# Patient Record
Sex: FEMALE | Race: WHITE | NOT HISPANIC OR LATINO | Employment: FULL TIME | URBAN - METROPOLITAN AREA
[De-identification: names, ages, dates, MRNs, and addresses within clinical notes are randomized per-mention and may not be internally consistent; named-entity substitution may affect disease eponyms.]

---

## 2019-09-03 ENCOUNTER — TRANSCRIBE ORDERS (OUTPATIENT)
Dept: ADMINISTRATIVE | Facility: HOSPITAL | Age: 64
End: 2019-09-03

## 2019-09-03 DIAGNOSIS — N63.20 LEFT BREAST LUMP: Primary | ICD-10-CM

## 2019-09-10 ENCOUNTER — HOSPITAL ENCOUNTER (OUTPATIENT)
Dept: MAMMOGRAPHY | Facility: CLINIC | Age: 64
Discharge: HOME | End: 2019-09-10
Payer: COMMERCIAL

## 2019-09-10 ENCOUNTER — HOSPITAL ENCOUNTER (OUTPATIENT)
Dept: ULTRASOUND IMAGING | Facility: CLINIC | Age: 64
Discharge: HOME | End: 2019-09-10
Payer: COMMERCIAL

## 2019-09-10 VITALS — BODY MASS INDEX: 30.21 KG/M2 | HEIGHT: 61 IN | WEIGHT: 160 LBS

## 2019-09-10 DIAGNOSIS — N63.20 LEFT BREAST LUMP: ICD-10-CM

## 2019-09-10 PROCEDURE — 76642 ULTRASOUND BREAST LIMITED: CPT

## 2019-09-10 PROCEDURE — G0279 TOMOSYNTHESIS, MAMMO: HCPCS

## 2019-09-10 PROCEDURE — 77066 DX MAMMO INCL CAD BI: CPT

## 2019-09-17 ENCOUNTER — HOSPITAL ENCOUNTER (OUTPATIENT)
Dept: MAMMOGRAPHY | Facility: CLINIC | Age: 64
Discharge: HOME | End: 2019-09-17
Payer: COMMERCIAL

## 2019-09-17 ENCOUNTER — HOSPITAL ENCOUNTER (OUTPATIENT)
Dept: ULTRASOUND IMAGING | Facility: CLINIC | Age: 64
Discharge: HOME | End: 2019-09-17
Payer: COMMERCIAL

## 2019-09-17 ENCOUNTER — HOSPITAL ENCOUNTER (OUTPATIENT)
Dept: MAMMOGRAPHY | Facility: CLINIC | Age: 64
Discharge: HOME | End: 2019-09-17
Admitting: RADIOLOGY
Payer: COMMERCIAL

## 2019-09-17 VITALS — HEIGHT: 61 IN | BODY MASS INDEX: 30.21 KG/M2 | WEIGHT: 160 LBS

## 2019-09-17 VITALS — SYSTOLIC BLOOD PRESSURE: 140 MMHG | HEART RATE: 68 BPM | DIASTOLIC BLOOD PRESSURE: 70 MMHG

## 2019-09-17 DIAGNOSIS — R92.8 ABNORMAL MAMMOGRAM: ICD-10-CM

## 2019-09-17 DIAGNOSIS — Z98.890 STATUS POST BREAST BIOPSY: ICD-10-CM

## 2019-09-17 DIAGNOSIS — R92.8 ABNORMAL ULTRASOUND OF BREAST: ICD-10-CM

## 2019-09-17 PROCEDURE — 88341 IMHCHEM/IMCYTCHM EA ADD ANTB: CPT | Performed by: PATHOLOGY

## 2019-09-17 PROCEDURE — 88305 TISSUE EXAM BY PATHOLOGIST: CPT | Performed by: PATHOLOGY

## 2019-09-17 PROCEDURE — 88342 IMHCHEM/IMCYTCHM 1ST ANTB: CPT | Performed by: PATHOLOGY

## 2019-09-17 PROCEDURE — 88361 TUMOR IMMUNOHISTOCHEM/COMPUT: CPT | Performed by: PATHOLOGY

## 2019-09-17 PROCEDURE — 19084 BX BREAST ADD LESION US IMAG: CPT

## 2019-09-17 PROCEDURE — 19083 BX BREAST 1ST LESION US IMAG: CPT

## 2019-09-17 PROCEDURE — 19081 BX BREAST 1ST LESION STRTCTC: CPT

## 2019-09-17 RX ORDER — LIDOCAINE HYDROCHLORIDE 10 MG/ML
4 INJECTION, SOLUTION INFILTRATION; PERINEURAL ONCE
Status: COMPLETED | OUTPATIENT
Start: 2019-09-17 | End: 2019-09-17

## 2019-09-17 RX ORDER — LIDOCAINE HYDROCHLORIDE AND EPINEPHRINE BITARTRATE 20; .01 MG/ML; MG/ML
10 INJECTION, SOLUTION SUBCUTANEOUS ONCE
Status: COMPLETED | OUTPATIENT
Start: 2019-09-17 | End: 2019-09-17

## 2019-09-17 RX ADMIN — LIDOCAINE HYDROCHLORIDE 4 ML: 10 INJECTION, SOLUTION INFILTRATION; PERINEURAL at 14:01

## 2019-09-17 RX ADMIN — LIDOCAINE HYDROCHLORIDE 4 ML: 10 INJECTION, SOLUTION INFILTRATION; PERINEURAL at 14:07

## 2019-09-17 RX ADMIN — LIDOCAINE HYDROCHLORIDE AND EPINEPHRINE 10 ML: 20; 10 INJECTION, SOLUTION INFILTRATION; PERINEURAL at 13:06

## 2019-09-17 RX ADMIN — LIDOCAINE HYDROCHLORIDE 4 ML: 10 INJECTION, SOLUTION INFILTRATION; PERINEURAL at 13:06

## 2019-09-17 NOTE — PROGRESS NOTES
(2nd site)  Procedure type:  __x___ultrasound guided _____stereotactic    Breast:  __x___Left _____Right    Location: 5:00 7cm from nipple    Needle: 12g Marquee    # of passes: 3 (specimen A)    Clip: Hydromark-butterfly    Performed by: Dr Carlo Baxter held for 5 minutes by: Dr Endy Dhillon Strips:  ___x__yes _____no    Band aid:  ___x__yes_____no    Tape and guaze:  _____yes __x___no    Tolerated procedure:  __x___yes _____no      (3rd site)  Procedure type:  __x___ultrasound guided _____stereotactic    Breast:  __x___Left _____Right    Location: 1:00 5cm from nipple    Needle: 12g Marquee    # of passes: 2 (specimen B)    Clip: Hydromark-open coil    Performed by: Dr Carlo Baxter held for 5 minutes by: Christina Lam Strips:  __x___yes _____no    Band aid:  __x___yes_____no    Tape and guaze:  _____yes __x___no    Tolerated procedure:  __x___yes _____no

## 2019-09-17 NOTE — DISCHARGE INSTR - OTHER ORDERS
POST LARGE CORE BREAST BIOPSY PATIENT INFORMATION      1  Place an ice pack inside your bra over the top of the dressing every hour for 20 minutes (20 minutes on, 60 minutes off)  Do this until bedtime  2  Do not shower or bathe until the following morning  3  You may bathe your breast carefully with the steri-strips in place  Be careful    Not to loosen them  The steri-strips will fall off in 3-5 days  4  You may have mild discomfort, and you may have some bruising where the   Needle entered the skin  This should clear within 5-7 days  5  If you need medicine for discomfort, take acetaminophen products such as   Tylenol  You may also take Advil or Motrin products  6  Do not participate in strenuous activities such as-tennis, aerobics, skiing,  Weight lifting, etc  for 24 hours  Refrain from swimming/soaking for 72 hours  7  Wearing a bra for sleeping may be more comfortable for the first 24-48 hours  8  Watch for continued bleeding, pain or fever over 101; please call with any questions or concerns  For procedures done at the [facility]  [address] call:  Diego Tatum RN at [phone]  Erenest Curling RN at [phone]                    *After 4 PM call the Interventional Radiology Department                    [phone] and ask to speak with the nurse on call  For procedures done at the [address] [place] [address] call:         Zachary Spatz RN at   *After 4 PM call the Interventional Radiology Department   [phone] and ask to speak with the nurse on call  For procedures done at [address] [place] [address] call: The Radiology Nurse at [phone]  *After 4 PM call your physician, or go to the Emergency Department  9          The final results of your biopsy are usually available within one week

## 2019-09-17 NOTE — PROGRESS NOTES
(1st site)  Procedure type:  _____ultrasound guided __x___stereotactic    Breast:  _____Left __x___Right    Location:    Needle: 8g Mammotome-Revolve    # of passes: 5 no calcs (density)    Clip: Mammomark-triple twist    Performed by: Dr July Dietz held for 5 minutes by: Burton Biggs    Steri Strips:  ___x__yes _____no    Band aid:  ___x__yes_____no    Tape and guaze:  _____yes __x___no    Tolerated procedure:  __x___yes _____no

## 2019-09-18 NOTE — PROGRESS NOTES
Post procedure call completed on 9/18/19 @ 2962    Bleeding: _____yes __x___no    Pain: _____yes __x____no    Redness/Swelling: ______yes __x____no    Band aid removed: __x___yes _____no    Steri-Strips intact: __x____yes _____no

## 2019-09-19 ENCOUNTER — TELEPHONE (OUTPATIENT)
Dept: MAMMOGRAPHY | Facility: CLINIC | Age: 64
End: 2019-09-19

## 2019-09-19 ENCOUNTER — TELEPHONE (OUTPATIENT)
Dept: SURGICAL ONCOLOGY | Facility: CLINIC | Age: 64
End: 2019-09-19

## 2019-09-19 NOTE — PROGRESS NOTES
Patient Past Medical History:  HTN; High Cholesterol          Allergies:  NKA        Past Breast History:     Previous Biopsy:   Yes______ No___x_____      Breast: Right____ Left______       Malignant______ Benign_______      Treatments if applicable        Present Breast History:     Right__________    Left_____x________     Density_________    Calcifications____________   Palpable_______x_______      Patient notified of results on: 9/19/19    Date of Appointment with Surgeon  Jacqui Morrison on 10/3/19 @ 9 am

## 2019-09-19 NOTE — TELEPHONE ENCOUNTER
New Patient Encounter    New Patient Intake Form   Patient Details:  Eduardo Vasquez  1955  [id number]    Background Information:  [address] starts by opening a telephone encounter and gathering the following information   Who is calling to schedule? If not self, relationship to patient? Lissa ROSAS   Referring Provider RBC    What is the diagnosis? L BREAST CA   When was the diagnosis? 9/2019   Is patient aware of diagnosis? Yes   Reason for visit? NP DX   Have you had any testing done? If so: when, where? Yes   Are records in [facility]? yes   Was the patient told to bring a disk? no   Scheduling Information:   Preferred [place]:  Any     Requesting Specific Provider? NO   Are there any dates/time the patient cannot be seen? NO   Counseling Pre-Screen:  If the patient answers YES to any of the below questions, please route to the appropriate location specific counselor    Have you felt anxious or worried about cancer and the treatment you are receiving? DID NOT SPEAK TO PT   Has your diagnosis caused physical, emotional, or financial hardship for you? DID NOT SPEAK TO PT    Note: Do not ask the patient about transportation issues/needs  Please notate if the patient brings it up and the counselor will schedule accordingly  Miscellaneous:requesting a  call her to answer her insurance questions  After completing the above information, please route to Financial Counselor and the appropriate Nurse Navigator for review

## 2019-09-24 ENCOUNTER — TELEPHONE (OUTPATIENT)
Dept: HEMATOLOGY ONCOLOGY | Facility: CLINIC | Age: 64
End: 2019-09-24

## 2019-10-01 PROBLEM — Z17.0 MALIGNANT NEOPLASM OF LOWER-OUTER QUADRANT OF LEFT BREAST OF FEMALE, ESTROGEN RECEPTOR POSITIVE (HCC): Status: ACTIVE | Noted: 2019-10-01

## 2019-10-01 PROBLEM — C50.512 MALIGNANT NEOPLASM OF LOWER-OUTER QUADRANT OF LEFT BREAST OF FEMALE, ESTROGEN RECEPTOR POSITIVE (HCC): Status: ACTIVE | Noted: 2019-10-01

## 2019-10-03 ENCOUNTER — APPOINTMENT (OUTPATIENT)
Dept: LAB | Facility: CLINIC | Age: 64
End: 2019-10-03
Payer: COMMERCIAL

## 2019-10-03 ENCOUNTER — TRANSCRIBE ORDERS (OUTPATIENT)
Dept: LAB | Facility: CLINIC | Age: 64
End: 2019-10-03

## 2019-10-03 ENCOUNTER — CONSULT (OUTPATIENT)
Dept: SURGICAL ONCOLOGY | Facility: CLINIC | Age: 64
End: 2019-10-03
Payer: COMMERCIAL

## 2019-10-03 ENCOUNTER — DOCUMENTATION (OUTPATIENT)
Dept: HEMATOLOGY ONCOLOGY | Facility: CLINIC | Age: 64
End: 2019-10-03

## 2019-10-03 VITALS
DIASTOLIC BLOOD PRESSURE: 80 MMHG | TEMPERATURE: 98.1 F | SYSTOLIC BLOOD PRESSURE: 140 MMHG | RESPIRATION RATE: 16 BRPM | HEIGHT: 61 IN | WEIGHT: 161 LBS | BODY MASS INDEX: 30.4 KG/M2 | HEART RATE: 111 BPM

## 2019-10-03 DIAGNOSIS — Z17.0 ESTROGEN RECEPTOR POSITIVE: ICD-10-CM

## 2019-10-03 DIAGNOSIS — Z17.0 MALIGNANT NEOPLASM OF LOWER-OUTER QUADRANT OF LEFT BREAST OF FEMALE, ESTROGEN RECEPTOR POSITIVE (HCC): ICD-10-CM

## 2019-10-03 DIAGNOSIS — C50.512 MALIGNANT NEOPLASM OF LOWER-OUTER QUADRANT OF LEFT BREAST OF FEMALE, ESTROGEN RECEPTOR POSITIVE (HCC): ICD-10-CM

## 2019-10-03 DIAGNOSIS — C50.512 MALIGNANT NEOPLASM OF LOWER-OUTER QUADRANT OF LEFT FEMALE BREAST, UNSPECIFIED ESTROGEN RECEPTOR STATUS (HCC): ICD-10-CM

## 2019-10-03 DIAGNOSIS — C50.512 MALIGNANT NEOPLASM OF LOWER-OUTER QUADRANT OF LEFT BREAST OF FEMALE, ESTROGEN RECEPTOR POSITIVE (HCC): Primary | ICD-10-CM

## 2019-10-03 DIAGNOSIS — C50.512 MALIGNANT NEOPLASM OF LOWER-OUTER QUADRANT OF LEFT FEMALE BREAST, UNSPECIFIED ESTROGEN RECEPTOR STATUS (HCC): Primary | ICD-10-CM

## 2019-10-03 DIAGNOSIS — Z17.0 MALIGNANT NEOPLASM OF LOWER-OUTER QUADRANT OF LEFT BREAST OF FEMALE, ESTROGEN RECEPTOR POSITIVE (HCC): Primary | ICD-10-CM

## 2019-10-03 LAB
ALBUMIN SERPL BCP-MCNC: 3.9 G/DL (ref 3.5–5)
ALP SERPL-CCNC: 87 U/L (ref 46–116)
ALT SERPL W P-5'-P-CCNC: 142 U/L (ref 12–78)
ANION GAP SERPL CALCULATED.3IONS-SCNC: 7 MMOL/L (ref 4–13)
AST SERPL W P-5'-P-CCNC: 100 U/L (ref 5–45)
BASOPHILS # BLD AUTO: 0.05 THOUSANDS/ΜL (ref 0–0.1)
BASOPHILS NFR BLD AUTO: 1 % (ref 0–1)
BILIRUB SERPL-MCNC: 0.7 MG/DL (ref 0.2–1)
BUN SERPL-MCNC: 9 MG/DL (ref 5–25)
CALCIUM SERPL-MCNC: 9.3 MG/DL (ref 8.3–10.1)
CHLORIDE SERPL-SCNC: 102 MMOL/L (ref 100–108)
CO2 SERPL-SCNC: 29 MMOL/L (ref 21–32)
CREAT SERPL-MCNC: 0.58 MG/DL (ref 0.6–1.3)
EOSINOPHIL # BLD AUTO: 0.08 THOUSAND/ΜL (ref 0–0.61)
EOSINOPHIL NFR BLD AUTO: 1 % (ref 0–6)
ERYTHROCYTE [DISTWIDTH] IN BLOOD BY AUTOMATED COUNT: 13.2 % (ref 11.6–15.1)
GFR SERPL CREATININE-BSD FRML MDRD: 99 ML/MIN/1.73SQ M
GLUCOSE P FAST SERPL-MCNC: 115 MG/DL (ref 65–99)
HCT VFR BLD AUTO: 44.7 % (ref 34.8–46.1)
HGB BLD-MCNC: 14.3 G/DL (ref 11.5–15.4)
IMM GRANULOCYTES # BLD AUTO: 0.01 THOUSAND/UL (ref 0–0.2)
IMM GRANULOCYTES NFR BLD AUTO: 0 % (ref 0–2)
LYMPHOCYTES # BLD AUTO: 1.17 THOUSANDS/ΜL (ref 0.6–4.47)
LYMPHOCYTES NFR BLD AUTO: 19 % (ref 14–44)
MCH RBC QN AUTO: 31.3 PG (ref 26.8–34.3)
MCHC RBC AUTO-ENTMCNC: 32 G/DL (ref 31.4–37.4)
MCV RBC AUTO: 98 FL (ref 82–98)
MONOCYTES # BLD AUTO: 0.46 THOUSAND/ΜL (ref 0.17–1.22)
MONOCYTES NFR BLD AUTO: 7 % (ref 4–12)
NEUTROPHILS # BLD AUTO: 4.5 THOUSANDS/ΜL (ref 1.85–7.62)
NEUTS SEG NFR BLD AUTO: 72 % (ref 43–75)
NRBC BLD AUTO-RTO: 0 /100 WBCS
PLATELET # BLD AUTO: 243 THOUSANDS/UL (ref 149–390)
PMV BLD AUTO: 9.7 FL (ref 8.9–12.7)
POTASSIUM SERPL-SCNC: 4.4 MMOL/L (ref 3.5–5.3)
PROT SERPL-MCNC: 8.1 G/DL (ref 6.4–8.2)
RBC # BLD AUTO: 4.57 MILLION/UL (ref 3.81–5.12)
SODIUM SERPL-SCNC: 138 MMOL/L (ref 136–145)
WBC # BLD AUTO: 6.27 THOUSAND/UL (ref 4.31–10.16)

## 2019-10-03 PROCEDURE — 85025 COMPLETE CBC W/AUTO DIFF WBC: CPT

## 2019-10-03 PROCEDURE — 36415 COLL VENOUS BLD VENIPUNCTURE: CPT

## 2019-10-03 PROCEDURE — 80053 COMPREHEN METABOLIC PANEL: CPT

## 2019-10-03 PROCEDURE — 99245 OFF/OP CONSLTJ NEW/EST HI 55: CPT | Performed by: SURGERY

## 2019-10-03 RX ORDER — UBIDECARENONE 75 MG
100 CAPSULE ORAL
COMMUNITY
End: 2021-06-16

## 2019-10-03 RX ORDER — BIOTIN 1 MG
1000 TABLET ORAL
COMMUNITY

## 2019-10-03 NOTE — PROGRESS NOTES
Surgical Oncology Consult Note       [address]  [facility] [place]  [address]  [place] [address]  1955  [id number]  [address]  [facility] [place]  [address] [facility] [place] [address]      Chief Complaint:     Chief Complaint   Patient presents with    Consult    Breast Cancer       Assessment and Plan:   Assessment/Plan     Patient presents with a new diagnosis of left breast HER2 positive cancer  The patient felt a palpable lump in the lower  Left breast    This was biopsied  And shown to be HER2 positive grade 1 disease  She had 2  Benign biopsies also performed  I have recommended genetic testing and presuming that is negative in breast conservation therapy  Because she is grade 1 and 2 cm in node-negative for I have recommended surgery up front but also chemotherapy afterwards in addition to radiation therapy  We will coordinate her genetic testing and see her back shortly thereafter  Oncology History:        Malignant neoplasm of lower-outer quadrant of left breast of female, estrogen receptor positive ([facility] [address] )    9/17/2019 Biopsy     Left breast ultrasound-guided biospy  A  5 o'clock, 7 cm from nipple  Invasive mammary carcinoma of no special type (ductal, not otherwise specified)  Grade 1  ER 70  OH < 1  HER2 3+    B  1 o'clock, 5 cm from nipple  Benign, fibrocystic changes without atypia  Involving intraductal micropapilloma  No evidence of malignancy    Right breast stereotactic biopsy  C  Right breast without calcifications  Benign, fibrocystic changes without atypia  No evidence of malignancy    Concordant  Unifocal; measures 2 cm on US  Left axilla negative  Right breast clear  Carcinoma with butterfly shaped clip  History of Present Illness:      This is a 56-year-old woman who appreciated a palpable abnormality in her lower left breast   She had a diagnostic mammogram which showed no abnormalities on the right side however there were 3 abnormalities on the left side all of which were biopsied to were benign however the palpable mass at the 5 o'clock position 7 cm from the nipple which shown to be invasive ductal carcinoma measuring approximately 2 cm in size  It was grade 1 ER 70% OK negative and HER2 3+/ positive  Evaluation of the axilla was performed and showed no evidence of any adenopathy  Her clip is a butterfly clip in this position her benign clip is open coil  Her family history is remarkable for her father having  Prostate cancer in his 76s  The patient has no children but does have other family members which would be impacted by a positive genetic results  Review of Systems:   Review of Systems   Constitutional: Negative for activity change, appetite change and fatigue  HENT: Negative  Eyes: Negative  Respiratory: Negative for cough, shortness of breath and wheezing  Cardiovascular: Negative for chest pain and leg swelling  Gastrointestinal: Negative  Endocrine: Negative  Genitourinary: Negative  Musculoskeletal:        No new changes or complaints of bone pain   Skin: Negative  Allergic/Immunologic: Negative  Neurological: Negative  Hematological: Negative  Psychiatric/Behavioral: Negative  Past Medical History:      Patient Active Problem List   Diagnosis    Malignant neoplasm of lower-outer quadrant of left breast of female, estrogen receptor positive ([facility] [address] )      History reviewed  No pertinent past medical history       Past Surgical History:   Procedure Laterality Date    BREAST BIOPSY Left 09/17/2019    MAMMO STEREOTACTIC BREAST BIOPSY RIGHT (ALL INC) Right 9/17/2019    US GUIDANCE BREAST BIOPSY LEFT EACH ADDITIONAL Left 9/17/2019    US GUIDED BREAST BIOPSY LEFT COMPLETE Left 9/17/2019        Family History   Problem Relation Age of Onset    Prostate cancer Father 79    No Known Problems Sister     No Known Problems Paternal Aunt     Nail disease Paternal Aunt     No Known Problems Paternal Aunt         Social History     Socioeconomic History    Marital status: /Civil Union     Spouse name: Not on file    Number of children: Not on file    Years of education: Not on file    Highest education level: Not on file   Occupational History    Not on file   Social Needs    Financial resource strain: Not on file    Food insecurity:     Worry: Not on file     Inability: Not on file    Transportation needs:     Medical: Not on file     Non-medical: Not on file   Tobacco Use    Smoking status: Former Smoker     Last attempt to quit:      Years since quittin 7    Smokeless tobacco: Never Used   Substance and Sexual Activity    Alcohol use:  Yes     Alcohol/week: 7 0 standard drinks     Types: 7 Standard drinks or equivalent per week    Drug use: Not on file    Sexual activity: Not on file   Lifestyle    Physical activity:     Days per week: Not on file     Minutes per session: Not on file    Stress: Not on file   Relationships    Social connections:     Talks on phone: Not on file     Gets together: Not on file     Attends Rastafarian service: Not on file     Active member of club or organization: Not on file     Attends meetings of clubs or organizations: Not on file     Relationship status: Not on file    Intimate partner violence:     Fear of current or ex partner: Not on file     Emotionally abused: Not on file     Physically abused: Not on file     Forced sexual activity: Not on file   Other Topics Concern    Not on file   Social History Narrative    Not on file        Current Outpatient Medications:     Cholecalciferol (VITAMIN D3) 1000 units CAPS, Take by mouth, Disp: , Rfl:     cyanocobalamin (VITAMIN B-12) 100 mcg tablet, Take by mouth daily, Disp: , Rfl:     Multiple Vitamins-Minerals (CENTRUM SILVER 50+WOMEN PO), Take by mouth, Disp: , Rfl:     olmesartan (BENICAR) 20 mg tablet, Take 40 mg by mouth daily, Disp: , Rfl: 2    rosuvastatin (CRESTOR) 5 mg tablet, TAKE 1 TABLET BY ORAL ROUTE EVERY MONDAY, WEDNESDAY, FRIDAY, Disp: , Rfl: 3   No Known Allergies    Physical Exam:     Vitals:    10/03/19 0851   BP: 140/80   Pulse: (!) 111   Resp: 16   Temp: 98 1 °F (36 7 °C)     Physical Exam   Constitutional: She is oriented to person, place, and time  She appears well-developed and well-nourished  HENT:   Head: Normocephalic and atraumatic  Mouth/Throat: Oropharynx is clear and moist    Eyes: Pupils are equal, round, and reactive to light  EOM are normal    Neck: Normal range of motion  Neck supple  No JVD present  No tracheal deviation present  No thyromegaly present  Cardiovascular: Normal rate, regular rhythm, normal heart sounds and intact distal pulses  Exam reveals no gallop and no friction rub  No murmur heard  Pulmonary/Chest: Effort normal and breath sounds normal  No respiratory distress  She has no wheezes  She has no rales  Examination of the right breast in both the sitting and supine position demonstrate no skin changes nipple discharge dominant masses or axillary adenopathy  Examination of the left breast demonstrates a dominant masses outlined on the diagram   There is no axillary adenopathy  She has minimal ecchymosis  There are no other masses within the breast        Abdominal: Soft  She exhibits no distension and no mass  There is no hepatomegaly  There is no tenderness  There is no rebound and no guarding  Musculoskeletal: Normal range of motion  She exhibits no edema or tenderness  Lymphadenopathy:     She has no cervical adenopathy  Neurological: She is alert and oriented to person, place, and time  No cranial nerve deficit  Skin: Skin is warm and dry  No rash noted  No erythema  Psychiatric: She has a normal mood and affect  Her behavior is normal    Vitals reviewed        Results:     I reviewed her mammogram and her pathology there concordant  Discussion/Summary:     I explained the patient for tumors that  HER2 positive and over 2 cm or have positive lymph nodes I recommend neoadjuvant chemotherapy  However her tumors at the borderline of this and because it is grade 1 and node-negative I have recommended surgery up front to help better define the behavior of the tumor and this would help the medical oncologist better determine appropriate systemic therapy  I have also recommended genetic testing  In the patient is agreeable to this approach  We will coordinate this today  I will see her back following her genetic testing results  All questions were answered the patient's satisfaction  Advance Care Planning/Advance Directives:  I discussed the disease status, treatment plans and follow-up with the patient

## 2019-10-03 NOTE — PROGRESS NOTES
BC Nurse Navigator:Navigation and Genetic visit  Met with patient and spouse, Edilson for initial navigation outreach and to facilitate genetic testing  I educated on insurance/copay and lab processes for genetic testing through [employer]  Explained my role as a nurse navigator and informed her that I am not a Genetics Counselor  I advised patient that if her results are either positive or a mutation of unknown significance we will schedule her for Genetics counseling session with a certified genetics counselor with the lab company   I advised her that positive result becomes part of the permanent medical records, and might have implications if she applies for life insurance  She verbalized understanding and wants to pursue testing-explained that results turnaround are typically 7-10 days, and Dr Ronaldo Castleman will share her results during her f/u visit in 2 weeks or sooner over the phone  Gave her literature on genetic testing, insurance/OOP averages and a card on how to reach the genetics lab for testing or billing concerns  Also provided info for testing of blood relatives through [facility]  Patient was mildly anxious and became tearful at the end of th visit  I provided therapeutic listening and offered emotional  support to the patient  I answered all her questions at this time to her satisfaction  Pt is requesting a  call her to answer her insurance questions, Referral sent to [address] [place] [address]  Pt was given my office contact information as well as a contact number, e-mail address and reference ID for [facility]  I provided her with written material for cancer support groups,cancer counselors etc   Patient verbalized understanding of information and was escorted to check out and then to the lab   Pt was encouraged to call for any questions/concerns  Will follow up with her on an as needed basis

## 2019-10-04 ENCOUNTER — DOCUMENTATION (OUTPATIENT)
Dept: HEMATOLOGY ONCOLOGY | Facility: CLINIC | Age: 64
End: 2019-10-04

## 2019-10-04 ENCOUNTER — TELEPHONE (OUTPATIENT)
Dept: SURGICAL ONCOLOGY | Facility: CLINIC | Age: 64
End: 2019-10-04

## 2019-10-04 ENCOUNTER — TELEPHONE (OUTPATIENT)
Dept: HEMATOLOGY ONCOLOGY | Facility: CLINIC | Age: 64
End: 2019-10-04

## 2019-10-04 DIAGNOSIS — R89.9 ABNORMAL LABORATORY TEST: Primary | ICD-10-CM

## 2019-10-04 DIAGNOSIS — C50.512 MALIGNANT NEOPLASM OF LOWER-OUTER QUADRANT OF LEFT BREAST OF FEMALE, ESTROGEN RECEPTOR POSITIVE (HCC): Primary | ICD-10-CM

## 2019-10-04 DIAGNOSIS — Z17.0 MALIGNANT NEOPLASM OF LOWER-OUTER QUADRANT OF LEFT BREAST OF FEMALE, ESTROGEN RECEPTOR POSITIVE (HCC): Primary | ICD-10-CM

## 2019-10-04 LAB — MISCELLANEOUS LAB TEST RESULT: NORMAL

## 2019-10-04 NOTE — TELEPHONE ENCOUNTER
Received a notification from the nurse navigator that patient had called with concerns regarding the CT scan  Called and spoke with patient who reported concerns regarding the CT scan  Per patient, she drank excessive alcohol on a recent vacation and believes that her LFTs are falsely elevated  Patient requested that the blood work be repeated  Dr Rafael Robin okay with this; order placed  Explained to patient to keep her CT scheduled in the event that there was no significant change in her LFT levels  Informed patient that a phone call would be made with her results  Patient verbalized understanding

## 2019-10-04 NOTE — TELEPHONE ENCOUNTER
[facility] Nurse Navigator:   Patient called informing me that her liver function tests were elevated and that a,  CT scan was recommended and ordered  Patient states she feels that is an aggressive approach and would like to first repeat the labs  She stated that she recently came back from a vacation and she had consumed more alcohol then normal  I did review her labs with her, voiced understanding of her wishes  Patient asked if Nelly Kelly RN could call her back  I spoke with Nelly Kelly who told me should would discuss with Dr Shawna Villalobos and then she would return pt's call  Will follow up as needed

## 2019-10-04 NOTE — TELEPHONE ENCOUNTER
Recvd message from Lisa singer that pt has some insurance & financial concerns could I call her  I called insurance & spoke to summer call ref# [id number]  Pt has an active cap blue ppo that runs on a meir year  Effective 07/01/14  Not cobra  Pt has chemo & radiation benefits $500 deduct met that  Then co-ins 80/20 out of pocket of $1500 met $620 01  No co-pays  Once the out of pocket is met the pt is covered at 100% & any co-pays would be waived  Chemo drugs are in the co-ins pcp co-pay is $25  Specialist ico-pay is $40  ER co-pay is $150 but waived if admitted  Advanced & standard dx imaging & labs have the same benefits co-ins then out of pocket then the pt will be covered at 100% once the  Out of pocket is met  Pt can go to any in network lab  In pt hospitalizations & out pt surgery  are covered the same way  genetic & genomic testing covered under the lab benefits  No rx benefits but speciality [facility] is [facility] not capitated but preferred  Can buy & bill  Called the pt to go over the benefits & got her voicemail  left her a message to call me back

## 2019-10-04 NOTE — TELEPHONE ENCOUNTER
Called Dr Anne Marie Pabon office this morning at the request of Dr Bryson Bryson  Informed their office that the patient's LFTs were elevated on her recent blood work  Per their office, the patient has not had any recent blood work to compare since 2017 when her levels were within normal limits  Spoke with Dr Bryson Bryson regarding this who recommended a CT scan  Called patient and informed her of the elevated numbers as well as the recommendation  Instructed patient to expect a phone call to schedule the imaging  Patient verbalized understanding and denies any additional questions or concerns at this time

## 2019-10-04 NOTE — PROGRESS NOTES
BC Nurse Navigator:Navigation and Genetic visit  Duplicate note from 83/8/39  Met with patient and spouse, for initial navigation outreach and to facilitate genetic testing  I educated on insurance/copay and lab processes for genetic testing through [facility]  Explained my role as a nurse navigator and informed her that I am not a Genetics Counselor  I advised patient that if her results are either positive or a mutation of unknown significance we will schedule her for Genetics counseling session with a certified genetics counselor with the lab company   I advised her that positive result becomes part of the permanent medical records, and might have implications if she applies for life insurance  She verbalized understanding and wants to pursue testing-explained that results turnaround are typically 7-10 days, and Dr Anibal Ragsdale will share her results during her f/u visit in 2 weeks or sooner over the phone  Gave her literature on genetic testing, insurance/OOP averages and a card on how to reach the genetics lab for testing or billing concerns  Also provided info for testing of blood relatives through [facility]  Patient was mildly anxious and became tearful at the end of th visit  I provided therapeutic listening and offered emotional  support to the patient  I answered all her questions at this time to her satisfaction  Pt is requesting a  call her to answer her insurance questions, Referral sent to [address] [place] [address]  Pt was given my office contact information as well as a contact number, e-mail address and reference ID for [facility]  I provided her with written material for cancer support groups,cancer counselors etc   Patient verbalized understanding of information and was escorted to check out and then to the lab   Pt was encouraged to call for any questions/concerns  Will follow up with her on an as needed basis

## 2019-10-08 ENCOUNTER — TELEPHONE (OUTPATIENT)
Dept: SURGICAL ONCOLOGY | Facility: CLINIC | Age: 64
End: 2019-10-08

## 2019-10-08 ENCOUNTER — APPOINTMENT (OUTPATIENT)
Dept: LAB | Facility: CLINIC | Age: 64
End: 2019-10-08
Payer: COMMERCIAL

## 2019-10-08 DIAGNOSIS — R89.9 ABNORMAL LABORATORY TEST: ICD-10-CM

## 2019-10-08 LAB
ALBUMIN SERPL BCP-MCNC: 3.9 G/DL (ref 3.5–5)
ALP SERPL-CCNC: 83 U/L (ref 46–116)
ALT SERPL W P-5'-P-CCNC: 139 U/L (ref 12–78)
ANION GAP SERPL CALCULATED.3IONS-SCNC: 8 MMOL/L (ref 4–13)
AST SERPL W P-5'-P-CCNC: 70 U/L (ref 5–45)
BILIRUB SERPL-MCNC: 0.51 MG/DL (ref 0.2–1)
BUN SERPL-MCNC: 11 MG/DL (ref 5–25)
CALCIUM SERPL-MCNC: 9.2 MG/DL (ref 8.3–10.1)
CHLORIDE SERPL-SCNC: 106 MMOL/L (ref 100–108)
CO2 SERPL-SCNC: 26 MMOL/L (ref 21–32)
CREAT SERPL-MCNC: 0.66 MG/DL (ref 0.6–1.3)
GFR SERPL CREATININE-BSD FRML MDRD: 95 ML/MIN/1.73SQ M
GLUCOSE P FAST SERPL-MCNC: 102 MG/DL (ref 65–99)
POTASSIUM SERPL-SCNC: 4 MMOL/L (ref 3.5–5.3)
PROT SERPL-MCNC: 7.6 G/DL (ref 6.4–8.2)
SODIUM SERPL-SCNC: 140 MMOL/L (ref 136–145)

## 2019-10-08 PROCEDURE — 36415 COLL VENOUS BLD VENIPUNCTURE: CPT

## 2019-10-08 PROCEDURE — 80053 COMPREHEN METABOLIC PANEL: CPT

## 2019-10-08 NOTE — TELEPHONE ENCOUNTER
Called patient to review blood work results  Explained that her LFTs were still elevated despite repeating the test and that Dr Bear Schwartz recommended the CT scan  Patient verbalized understanding  Verified with patient that the CT scan is scheduled for 10/10  Patient denies any additional questions at this time

## 2019-10-10 ENCOUNTER — HOSPITAL ENCOUNTER (OUTPATIENT)
Dept: RADIOLOGY | Age: 64
Discharge: HOME | End: 2019-10-10
Payer: COMMERCIAL

## 2019-10-10 DIAGNOSIS — Z17.0 MALIGNANT NEOPLASM OF LOWER-OUTER QUADRANT OF LEFT BREAST OF FEMALE, ESTROGEN RECEPTOR POSITIVE (HCC): ICD-10-CM

## 2019-10-10 DIAGNOSIS — C50.512 MALIGNANT NEOPLASM OF LOWER-OUTER QUADRANT OF LEFT BREAST OF FEMALE, ESTROGEN RECEPTOR POSITIVE (HCC): ICD-10-CM

## 2019-10-10 PROCEDURE — 71260 CT THORAX DX C+: CPT

## 2019-10-10 PROCEDURE — 74177 CT ABD & PELVIS W/CONTRAST: CPT

## 2019-10-10 RX ADMIN — IOHEXOL 85 ML: 350 INJECTION, SOLUTION INTRAVENOUS at 08:09

## 2019-10-15 ENCOUNTER — TELEPHONE (OUTPATIENT)
Dept: SURGICAL ONCOLOGY | Facility: CLINIC | Age: 64
End: 2019-10-15

## 2019-10-15 DIAGNOSIS — C50.512 MALIGNANT NEOPLASM OF LOWER-OUTER QUADRANT OF LEFT BREAST OF FEMALE, ESTROGEN RECEPTOR POSITIVE (HCC): ICD-10-CM

## 2019-10-15 DIAGNOSIS — R89.9 ABNORMAL LABORATORY TEST: ICD-10-CM

## 2019-10-15 DIAGNOSIS — Z17.0 MALIGNANT NEOPLASM OF LOWER-OUTER QUADRANT OF LEFT BREAST OF FEMALE, ESTROGEN RECEPTOR POSITIVE (HCC): ICD-10-CM

## 2019-10-15 DIAGNOSIS — R92.8 ABNORMAL FINDING ON BREAST IMAGING: Primary | ICD-10-CM

## 2019-10-15 DIAGNOSIS — R68.89 ABNORMAL FINDING: Primary | ICD-10-CM

## 2019-10-15 DIAGNOSIS — R93.2 ABNORMAL CT OF LIVER: Primary | ICD-10-CM

## 2019-10-15 DIAGNOSIS — R92.8 ABNORMAL FINDING ON BREAST IMAGING: ICD-10-CM

## 2019-10-15 NOTE — TELEPHONE ENCOUNTER
Called patient to review CT and genetic testing results  There was no answer; message left with call back number provided  Patient returned the phone call  Explained that her genetic testing came back negative; patient verbalized relief and understanding  Discussed CT scan results in length with patient  Explained that there was no sign of metastatic disease in her chest but that there were possible simple cysts in her liver  Discussed this in detail and that they had recommended an abdominal MRI for further evaluation  Order placed for MRI  Also discussed incidental adnexal cyst finding and that a pelvic ultrasound was recommended  Patient stated that she no longer follows with a gynecologist  Number provided for [facility]  Patient verbalized understanding of all of the above  Instructed patient to keep her pre op appointment with Dr Kulwant Tolliver on 10/17 and if there were any findings that would prevent surgery, that a phone call would be made  Patient was appreciative of the phone call

## 2019-10-16 ENCOUNTER — TELEPHONE (OUTPATIENT)
Dept: OBGYN CLINIC | Facility: CLINIC | Age: 64
End: 2019-10-16

## 2019-10-16 NOTE — TELEPHONE ENCOUNTER
Traci Page from Dr Harshal Flores office called to move pt up sooner appt, due to the incidental find on the ct that was done, she said the pt needs an us but they want her to have a gyn order the us in case of follow up care that may be needed  Apt moved up, pt has breast ca

## 2019-10-17 ENCOUNTER — OFFICE VISIT (OUTPATIENT)
Dept: LAB | Facility: CLINIC | Age: 64
End: 2019-10-17
Payer: COMMERCIAL

## 2019-10-17 ENCOUNTER — OFFICE VISIT (OUTPATIENT)
Dept: SURGICAL ONCOLOGY | Facility: CLINIC | Age: 64
End: 2019-10-17
Payer: COMMERCIAL

## 2019-10-17 VITALS
BODY MASS INDEX: 31.72 KG/M2 | HEART RATE: 112 BPM | RESPIRATION RATE: 20 BRPM | DIASTOLIC BLOOD PRESSURE: 100 MMHG | WEIGHT: 168 LBS | TEMPERATURE: 97.9 F | SYSTOLIC BLOOD PRESSURE: 162 MMHG | HEIGHT: 61 IN

## 2019-10-17 DIAGNOSIS — Z13.71 BRCA GENE MUTATION NEGATIVE: ICD-10-CM

## 2019-10-17 DIAGNOSIS — C50.512 MALIGNANT NEOPLASM OF LOWER-OUTER QUADRANT OF LEFT BREAST OF FEMALE, ESTROGEN RECEPTOR POSITIVE (HCC): Primary | ICD-10-CM

## 2019-10-17 DIAGNOSIS — Z17.0 MALIGNANT NEOPLASM OF LOWER-OUTER QUADRANT OF LEFT BREAST OF FEMALE, ESTROGEN RECEPTOR POSITIVE (HCC): Primary | ICD-10-CM

## 2019-10-17 DIAGNOSIS — C50.512 MALIGNANT NEOPLASM OF LOWER-OUTER QUADRANT OF LEFT BREAST OF FEMALE, ESTROGEN RECEPTOR POSITIVE (HCC): ICD-10-CM

## 2019-10-17 DIAGNOSIS — Z01.818 PREOP EXAMINATION: ICD-10-CM

## 2019-10-17 DIAGNOSIS — Z17.0 MALIGNANT NEOPLASM OF LOWER-OUTER QUADRANT OF LEFT BREAST OF FEMALE, ESTROGEN RECEPTOR POSITIVE (HCC): ICD-10-CM

## 2019-10-17 LAB
ATRIAL RATE: 96 BPM
P AXIS: 64 DEGREES
PR INTERVAL: 174 MS
QRS AXIS: 90 DEGREES
QRSD INTERVAL: 94 MS
QT INTERVAL: 386 MS
QTC INTERVAL: 487 MS
T WAVE AXIS: 46 DEGREES
VENTRICULAR RATE: 96 BPM

## 2019-10-17 PROCEDURE — 99214 OFFICE O/P EST MOD 30 MIN: CPT | Performed by: SURGERY

## 2019-10-17 PROCEDURE — 93005 ELECTROCARDIOGRAM TRACING: CPT

## 2019-10-17 PROCEDURE — 93010 ELECTROCARDIOGRAM REPORT: CPT | Performed by: INTERNAL MEDICINE

## 2019-10-17 RX ORDER — OXYCODONE HYDROCHLORIDE AND ACETAMINOPHEN 5; 325 MG/1; MG/1
1 TABLET ORAL EVERY 6 HOURS PRN
Qty: 6 TABLET | Refills: 0 | Status: SHIPPED | OUTPATIENT
Start: 2019-10-17 | End: 2020-06-22 | Stop reason: ALTCHOICE

## 2019-10-17 NOTE — PATIENT INSTRUCTIONS
Pre-Surgery Instructions:   Medication Instructions    Cholecalciferol (VITAMIN D3) 1000 units CAPS Stop taking 1 days prior to surgery    cyanocobalamin (VITAMIN B-12) 100 mcg tablet Stop taking 1 days prior to surgery    Multiple Vitamins-Minerals (CENTRUM SILVER 50+WOMEN PO) Stop taking 1 days prior to surgery    olmesartan (BENICAR) 20 mg tablet Take morning of surgery    rosuvastatin (CRESTOR) 5 mg tablet Take morning of surgery           Breast Lumpectomy   AMBULATORY CARE:   What you need to know about a lumpectomy:  A lumpectomy is surgery to remove a mass in your breast  Breast tissue that surrounds the mass may also be taken  A lumpectomy is also known as breast-conserving surgery, a partial mastectomy, or a segmental mastectomy  How to prepare for a lumpectomy:   · You may need a mammogram or ultrasound before surgery  These tests may be done the same day as your surgery or at an earlier time  Your healthcare provider may use pictures from these tests to dejan the location of the mass  The marker will show him where to make your incision  · Your healthcare provider will talk to you about how to prepare for surgery  He may tell you not to eat or drink anything after midnight on the day of your surgery  He will tell you what medicines to take or not take on the day of your surgery  You may need to stop taking blood thinners or aspirin several days before your surgery  Arrange for someone to drive you home and stay with you for 24 hours after surgery  This person can help care for you, and monitor for any problems  What will happen during a lumpectomy:  You will be given general anesthesia to keep you asleep and free from pain during surgery  You may be given an antibiotic through your IV to help prevent a bacterial infection  Your healthcare provider will make an incision in your breast and remove the mass  He may also remove breast tissue or lymph nodes that are close to the mass   A drain may be inserted near your incision to remove extra fluid  This will decrease swelling and help your incision heal  Your healthcare provider will close your incision with stitches or strips of medical tape and cover it with a bandage  He may also wrap a tight-fitting bandage around both of your breasts  This may decrease swelling, bleeding, and pain  What will happen after a lumpectomy:  Healthcare providers will monitor you until you are awake  You may able to go home when you are awake and your pain is controlled  Instead you may need to spend the night in the hospital    Risks of a lumpectomy:  You may bleed more than expected or get an infection  Nerves, blood vessels, and muscles may be damaged during your surgery  You may have swelling in your arm closest to the lumpectomy or where lymph nodes were removed  This swelling is called lymphedema  Lymphedema may cause tingling, numbness, stiffness, and weakness in your arm  This may be permanent  You may get a blood clot in your arm or leg  The blood clot may travel to your heart lungs, or brain  This may become life-threatening  Call 911 for any of the following:   · You feel lightheaded, short of breath, and have chest pain  · You cough up blood  · You have trouble breathing  Seek care immediately if:   · Blood soaks through your bandage  · Your stitches come apart  · Your bruise suddenly gets bigger  · Your leg or arm is larger than normal and painful  Contact your healthcare provider if:   · You have a fever or chills  · Your wound is red, swollen, or draining pus  · You have nausea or are vomiting  · Your skin is itchy, swollen, or you have a rash  · Your pain does not get better after you take pain medicine  · Your drain falls out or stops draining fluid  · Your drain has pus or foul-smelling fluid coming out of it  · You have questions or concerns about your condition or care  Medicines:   You may need any of the following:  · Antibiotics  help prevent a bacterial infection  · Prescription pain medicine  may be given  Ask your healthcare provider how to take this medicine safely  Some prescription pain medicines contain acetaminophen  Do not take other medicines that contain acetaminophen without talking to your healthcare provider  Too much acetaminophen may cause liver damage  Prescription pain medicine may cause constipation  Ask your healthcare provider how to prevent or treat constipation  · NSAIDs , such as ibuprofen, help decrease swelling, pain, and fever  NSAIDs can cause stomach bleeding or kidney problems in certain people  If you take blood thinner medicine, always ask your healthcare provider if NSAIDs are safe for you  Always read the medicine label and follow directions  · Take your medicine as directed  Contact your healthcare provider if you think your medicine is not helping or if you have side effects  Tell him or her if you are allergic to any medicine  Keep a list of the medicines, vitamins, and herbs you take  Include the amounts, and when and why you take them  Bring the list or the pill bottles to follow-up visits  Carry your medicine list with you in case of an emergency  Care for your wound as directed: If you have a tight-fitting bandage, you can remove it in 24 to 48 hours, or as directed  Ask your healthcare provider when your incision can get wet  You may need to take a sponge bath until your drain is removed  Carefully wash around the incision with soap and water  It is okay to allow the soap and water to gently run over your incision  Gently pat dry the area and put on new, clean bandages as directed  Change your bandages when they get wet or dirty  Check your incision every day for redness, pus, or swelling  Self-care:   · Apply ice  on your breast for 15 to 20 minutes every hour or as directed  Use an ice pack, or put crushed ice in a plastic bag  Cover it with a towel   Ice helps prevent tissue damage and decreases swelling and pain  · Rest  as directed  Do not lift anything heavy  Do not push or pull with your arms  Take short walks around the house  Gradually walk further as you feel better  Ask your healthcare provider when you can return to your normal activities  · Empty your drain  as directed  You may need to write down how much you empty from your drain each day  Ask your healthcare provider for more information about how to empty your drain  · Wear a supportive bra  as directed  Wait until you remove the tight-fitting bandage to wear a bra  You may be given a surgical bra or told to wear a sports bra  A supportive bra may help hold your bandages in place  It may also help with swelling and pain  Do not  wear bras with lace or underwire  They may rub against your incision and cause discomfort  Arm stretches: Your healthcare provider may show you how to do arm stretches  Arm stretches may prevent stiff arms or shoulders  You may need to wait until after your drains are removed to begin stretching  Do not do arm stretches until your healthcare provider says it is okay  Ask your healthcare provider for more information about arm stretches  Follow up with your healthcare provider as directed:  Write down your questions so you remember to ask them during your visits  © 2017 [facility] Information is for End User's use only and may not be sold, redistributed or otherwise used for commercial purposes  All illustrations and images included in CareNotes® are the copyrighted property of [employer]  or Mathieu Kirk  The above information is an  only  It is not intended as medical advice for individual conditions or treatments  Talk to your doctor, nurse or pharmacist before following any medical regimen to see if it is safe and effective for you          Breast Cancer [place] Lymph Node Biopsy   AMBULATORY CARE:   What you need to know about a sentinel lymph node biopsy (SLNB):  A sentinel lymph node (SLN) is usually the lymph node closest to the breast tumor  It is usually found in the armpit, or along the sternum (breastbone) or collarbone  A biopsy is a procedure used to find and remove a SLN  During the biopsy, the SLN will be tested for cancer cells  If the test is positive, it may mean that breast cancer has spread outside of your breast  This information can help your healthcare provider decide what other treatments you need  How to prepare for a SLNB:   · You may need a nuclear scan before your procedure  During a nuclear scan, healthcare providers will inject a small amount of radioactive liquid in your breast  Radioactive liquid will move to the location of your lymph nodes and help them show up better in pictures  A camera will take pictures of the lymph nodes  The pictures will help your healthcare provider plan for your procedure  · Your healthcare provider will talk to you about how to prepare for your procedure  He may tell you not to eat or drink anything after midnight on the day of your procedure  He will tell you what medicines to take or not take on the day of your procedure  You may be given contrast liquid during your biopsy  Tell your healthcare provider if you have ever had an allergic reaction to contrast liquid  Arrange for someone to drive you home and stay with you after your procedure  What will happen during a SLNB:   · You may be given an antibiotic through your IV to help prevent a bacterial infection  Tell the healthcare provider if you have ever had an allergic reaction to an antibiotic  You may be given general anesthesia to keep you asleep and free from pain during your procedure  You may instead be given local anesthesia to numb the area  With local anesthesia, you may still feel pressure or pushing during the procedure, but you should not feel any pain       · Your healthcare provider will inject blue contrast liquid, radioactive liquid, or both near the tumor  The liquid will move to the SLN  Your healthcare provider may use an instrument to help find the SLN  He will do this by gently moving an instrument over your skin  The instrument will show pictures of the SLN on a monitor  Your healthcare provider will make a small incision in the skin that covers the SLN  The incision is usually in your armpit or chest  The SLN will be removed and checked for cancer cells  If cancer is found, your healthcare provider may remove several more lymph nodes for testing  Your incision may be closed with stitches or strips of medical tape and covered with a bandage  What will happen after a SLNB:  Healthcare providers will monitor you until you are awake  You may be able to go home after you are awake and your pain is controlled  Your urine or bowel movement may be blue for 24 to 48 hours after your procedure  This is caused by the blue contrast liquid given to you during the procedure  You may have bruising or swelling at the biopsy site  This is normal and expected  The arm closest to the biopsy site may be sore  This should get better within 48 to 72 hours  Risks of a SLNB:  You may bleed more than expected or get an infection  You may develop a condition called lymphedema  Lymphedema is tissue swelling in your arm nearest to where the SLN was removed  You may have long-term pain or discomfort in your arm  Your skin in the arm may be permanently thick or hard  Your nerves may be damaged during your procedure  This may cause numbness or tingling in your arm  It may also cause difficulty moving your arm  You may have an allergic reaction to the contrast liquid  This may require medicine or other treatments  Seek care immediately if:   · Blood soaks through your bandage  · Your stitches come apart  · Your bruise suddenly gets larger and feels firm    Contact your healthcare provider if:   · You have a fever or chills  · Your wound is red, swollen, or draining pus  · You have nausea or are vomiting  · Your skin is itchy, swollen, or you have a rash  · Your pain does not get better after you take medicine for pain  · You have questions or concerns about your condition or care  Medicines: You may need any of the following:  · NSAIDs , such as ibuprofen, help decrease swelling, pain, and fever  This medicine is available with or without a doctor's order  NSAIDs can cause stomach bleeding or kidney problems in certain people  If you take blood thinner medicine, always ask your healthcare provider if NSAIDs are safe for you  Always read the medicine label and follow directions  · Acetaminophen  decreases pain and fever  It is available without a doctor's order  Ask how much to take and how often to take it  Follow directions  Read the labels of all other medicines you are using to see if they also contain acetaminophen, or ask your doctor or pharmacist  Acetaminophen can cause liver damage if not taken correctly  Do not use more than 4 grams (4,000 milligrams) total of acetaminophen in one day  · Prescription pain medicine  may be given  Ask your healthcare provider how to take this medicine safely  Some prescription pain medicines contain acetaminophen  Do not take other medicines that contain acetaminophen without talking to your healthcare provider  Too much acetaminophen may cause liver damage  Prescription pain medicine may cause constipation  Ask your healthcare provider how to prevent or treat constipation  · Take your medicine as directed  Contact your healthcare provider if you think your medicine is not helping or if you have side effects  Tell him or her if you are allergic to any medicine  Keep a list of the medicines, vitamins, and herbs you take  Include the amounts, and when and why you take them  Bring the list or the pill bottles to follow-up visits   Carry your medicine list with you in case of an emergency  Care for your incision wound as directed:  Ask your healthcare provider when your wound can get wet  Carefully wash around the wound with soap and water  It is okay to let soap and water gently run over your wound  Do not  scrub your wound  Gently pat dry the area and put on new, clean bandages as directed  Change your bandages when they get wet or dirty  If you have strips of medical tape, let them fall of on their own  It may take 10 to 14 days for them to fall off  Check your wound every day for signs of infection, such as redness, swelling, or pus  Do not put powders or lotions on your wound  If lymph nodes have been taken from your armpit, ask your healthcare provider when you can wear deodorant  Self-care:   · Apply ice  on your wound for 15 to 20 minutes every hour or as directed  Use an ice pack, or put crushed ice in a plastic bag  Cover it with a towel before you apply it to your skin  Ice helps prevent tissue damage and decreases swelling and pain  · Elevate  your arm nearest to the biopsy site as often as you can  This will help decrease swelling and pain  Prop your arm on pillows or blankets to keep it elevated above the level of your heart comfortably  · Do not do strenuous activities  for 24 to 48 hours  Strenuous activities include heavy lifting, sports, or running  If lymph nodes were taken from your armpit, do not push or pull with your arm  These activities may put too much stress on your wound  Rest and take short walks around the house  Ask your healthcare provider when you can return to your normal activities  · Drink plenty of liquids  as directed  This will help flush out contrast liquid from your body  Ask how much liquid to drink each day and which liquids are best for you  Ask your healthcare provider how to prevent lymphedema and infection:  Lymphedema is fluid buildup in fatty tissues under your skin   Lymphedema may happen in the arm closest to where lymph nodes were removed  An infection in your skin can make lymphedema worse  Ask your healthcare provider how you can decrease your risk for skin infections and lymphedema  Follow up with your healthcare provider as directed:  Write down your questions so you remember to ask them during your visits  © 2017 [address] [employer] Information is for End User's use only and may not be sold, redistributed or otherwise used for commercial purposes  All illustrations and images included in CareNotes® are the copyrighted property of [employer]  or Mathieu Kirk  The above information is an  only  It is not intended as medical advice for individual conditions or treatments  Talk to your doctor, nurse or pharmacist before following any medical regimen to see if it is safe and effective for you

## 2019-10-17 NOTE — H&P (VIEW-ONLY)
Surgical Oncology Follow Up       [address] [facility],6[address]  [facility]  [address]  [facility] [address]    Katrina [facility]  1955  [id number]  [address] [facility],[address] [facility]  [facility]  [address] [address]    Chief Complaint   Patient presents with    Pre-op Exam          Assessment & Plan:   Patient presents for a follow-up visit  Her genetic testing is negative  Her CT scan demonstrated scattered hypodensities in the liver  The CT scan was ordered because her LFTs were elevated  She has seen her PCP who is lower in her statins  They have recommended MRI to confirm that these are simple cyst   The patient is now refusing that secondary study  Patient also seeing her gynecologist for a 1 6 cm adnexal benign-appearing cyst   The patient I went through the process of informed consent for a left breast needle localization lumpectomy in conjunction with a sentinel lymph node biopsy and possible axillary dissection  I reviewed all complications as outlined on the consent form and explained them to the patient including drawings  All questions were answered the patient's satisfaction  We will coordinate the surgery next mutual convenience  We also reviewed adjuvant therapy including chemotherapy and anti HER2 therapy  Patient is interested cold caps so she will look into this information more thoroughly      Cancer History:        Malignant neoplasm of lower-outer quadrant of left breast of female, estrogen receptor positive ([facility] [address] )    9/17/2019 Biopsy     Left breast ultrasound-guided biospy  A  5 o'clock, 7 cm from nipple  Invasive mammary carcinoma of no special type (ductal, not otherwise specified)  Grade 1  ER 70  MN < 1  HER2 3+    B  1 o'clock, 5 cm from nipple  Benign, fibrocystic changes without atypia  Involving intraductal micropapilloma  No evidence of malignancy    Right breast stereotactic biopsy  C  Right breast without calcifications  Benign, fibrocystic changes without atypia  No evidence of malignancy    Concordant  Unifocal; measures 2 cm on US  Left axilla negative  Right breast clear  Carcinoma with butterfly shaped clip       10/10/2019 Genetic Testing     The following genes were evaluated: MOI, BRCA1, BRCA2, CDH1, CHEK2, PALB2, PTEN, STK11, TP53  Negative result  No pathogenic sequence variants or deletions/dupllications identified  [employer]           Interval History:   See Assessment & Plan    Review of Systems:   Review of Systems   Constitutional: Negative for activity change, appetite change and fatigue  HENT: Negative  Eyes: Negative  Respiratory: Negative for cough, shortness of breath and wheezing  Cardiovascular: Negative for chest pain and leg swelling  Gastrointestinal: Negative  Endocrine: Negative  Genitourinary: Negative  Musculoskeletal:        No new changes or complaints of bone pain   Skin: Negative  Allergic/Immunologic: Negative  Neurological: Negative  Hematological: Negative  Psychiatric/Behavioral: Negative  Past Medical History     Patient Active Problem List   Diagnosis    Malignant neoplasm of lower-outer quadrant of left breast of female, estrogen receptor positive ([facility] [address] )     No past medical history on file    Past Surgical History:   Procedure Laterality Date    BREAST BIOPSY Left 09/17/2019    MAMMO STEREOTACTIC BREAST BIOPSY RIGHT (ALL INC) Right 9/17/2019    US GUIDANCE BREAST BIOPSY LEFT EACH ADDITIONAL Left 9/17/2019    US GUIDED BREAST BIOPSY LEFT COMPLETE Left 9/17/2019     Family History   Problem Relation Age of Onset    Prostate cancer Father 79    No Known Problems Sister     No Known Problems Paternal Aunt     Nail disease Paternal Aunt     No Known Problems Paternal Aunt      Social History     Socioeconomic History    Marital status: /Civil Union     Spouse name: Not on file    Number of children: Not on file    Years of education: Not on file    Highest education level: Not on file   Occupational History    Not on file   Social Needs    Financial resource strain: Not on file    Food insecurity:     Worry: Not on file     Inability: Not on file    Transportation needs:     Medical: Not on file     Non-medical: Not on file   Tobacco Use    Smoking status: Former Smoker     Last attempt to quit: 2010     Years since quittin 7    Smokeless tobacco: Never Used   Substance and Sexual Activity    Alcohol use:  Yes     Alcohol/week: 7 0 standard drinks     Types: 7 Standard drinks or equivalent per week    Drug use: Not on file    Sexual activity: Not on file   Lifestyle    Physical activity:     Days per week: Not on file     Minutes per session: Not on file    Stress: Not on file   Relationships    Social connections:     Talks on phone: Not on file     Gets together: Not on file     Attends Taoist service: Not on file     Active member of club or organization: Not on file     Attends meetings of clubs or organizations: Not on file     Relationship status: Not on file    Intimate partner violence:     Fear of current or ex partner: Not on file     Emotionally abused: Not on file     Physically abused: Not on file     Forced sexual activity: Not on file   Other Topics Concern    Not on file   Social History Narrative    Not on file       Current Outpatient Medications:     Cholecalciferol (VITAMIN D3) 1000 units CAPS, Take by mouth, Disp: , Rfl:     cyanocobalamin (VITAMIN B-12) 100 mcg tablet, Take by mouth daily, Disp: , Rfl:     Multiple Vitamins-Minerals (CENTRUM SILVER 50+WOMEN PO), Take by mouth, Disp: , Rfl:     olmesartan (BENICAR) 20 mg tablet, Take 40 mg by mouth daily, Disp: , Rfl: 2    rosuvastatin (CRESTOR) 5 mg tablet, TAKE 1 TABLET BY ORAL ROUTE EVERY MONDAY, WEDNESDAY, FRIDAY, Disp: , Rfl: 3  No Known Allergies    Physical Exam:     Vitals:    10/17/19 1134   BP: 162/100 Pulse: (!) 112   Resp: 20   Temp: 97 9 °F (36 6 °C)     Physical Exam   Constitutional: She is oriented to person, place, and time  She appears well-developed and well-nourished  HENT:   Head: Normocephalic and atraumatic  Mouth/Throat: Oropharynx is clear and moist    Eyes: Pupils are equal, round, and reactive to light  EOM are normal    Neck: Normal range of motion  Neck supple  No JVD present  No tracheal deviation present  No thyromegaly present  Cardiovascular: Normal rate, regular rhythm, normal heart sounds and intact distal pulses  Exam reveals no gallop and no friction rub  No murmur heard  Pulmonary/Chest: Effort normal and breath sounds normal  No respiratory distress  She has no wheezes  She has no rales  Examination of both breast demonstrate well-healed biopsy marks  There are no dominant masses or axillary adenopathy  Abdominal: Soft  She exhibits no distension and no mass  There is no hepatomegaly  There is no tenderness  There is no rebound and no guarding  Musculoskeletal: Normal range of motion  She exhibits no edema or tenderness  Lymphadenopathy:     She has no cervical adenopathy  Neurological: She is alert and oriented to person, place, and time  No cranial nerve deficit  Skin: Skin is warm and dry  No rash noted  No erythema  Psychiatric: She has a normal mood and affect  Her behavior is normal    Vitals reviewed  Results & Discussion:   I reviewed the genetic results as well as a CT scan results with the patient  She has elected to not have an MRI at this point in time but will follow with her PCP  Typically I would not have ordered the CT scan except for the elevated LFTs which may be related to statins as well as an apparent alcohol consumption on her birthday  Repeat LFTs have decreased  Consequently I think it is very unlikely that she has metastatic disease sufficient to elevate her LFTs    We will proceed with surgery as outlined above         Advance Care Planning/Advance Directives:  I discussed the disease status, treatment plans and follow-up with the patient

## 2019-10-17 NOTE — PROGRESS NOTES
Surgical Oncology Follow Up       [address] [facility],6[address]  [facility]  [address] [facility] [address]  [facility] [address]    Sammy Juares  1955  [id number]  [address] [facility],[address] [facility]  [facility] [place]  [address] [address]    Chief Complaint   Patient presents with    Pre-op Exam          Assessment & Plan:   Patient presents for a follow-up visit  Her genetic testing is negative  Her CT scan demonstrated scattered hypodensities in the liver  The CT scan was ordered because her LFTs were elevated  She has seen her PCP who is lower in her statins  They have recommended MRI to confirm that these are simple cyst   The patient is now refusing that secondary study  Patient also seeing her gynecologist for a 1 6 cm adnexal benign-appearing cyst   The patient I went through the process of informed consent for a left breast needle localization lumpectomy in conjunction with a sentinel lymph node biopsy and possible axillary dissection  I reviewed all complications as outlined on the consent form and explained them to the patient including drawings  All questions were answered the patient's satisfaction  We will coordinate the surgery next mutual convenience  We also reviewed adjuvant therapy including chemotherapy and anti HER2 therapy  Patient is interested cold caps so she will look into this information more thoroughly      Cancer History:        Malignant neoplasm of lower-outer quadrant of left breast of female, estrogen receptor positive ([facility] [address] )    9/17/2019 Biopsy     Left breast ultrasound-guided biospy  A  5 o'clock, 7 cm from nipple  Invasive mammary carcinoma of no special type (ductal, not otherwise specified)  Grade 1  ER 70  WV < 1  HER2 3+    B  1 o'clock, 5 cm from nipple  Benign, fibrocystic changes without atypia  Involving intraductal micropapilloma  No evidence of malignancy    Right breast stereotactic biopsy  C  Right breast without calcifications  Benign, fibrocystic changes without atypia  No evidence of malignancy    Concordant  Unifocal; measures 2 cm on US  Left axilla negative  Right breast clear  Carcinoma with butterfly shaped clip       10/10/2019 Genetic Testing     The following genes were evaluated: MOI, BRCA1, BRCA2, CDH1, CHEK2, PALB2, PTEN, STK11, TP53  Negative result  No pathogenic sequence variants or deletions/dupllications identified  [employer]           Interval History:   See Assessment & Plan    Review of Systems:   Review of Systems   Constitutional: Negative for activity change, appetite change and fatigue  HENT: Negative  Eyes: Negative  Respiratory: Negative for cough, shortness of breath and wheezing  Cardiovascular: Negative for chest pain and leg swelling  Gastrointestinal: Negative  Endocrine: Negative  Genitourinary: Negative  Musculoskeletal:        No new changes or complaints of bone pain   Skin: Negative  Allergic/Immunologic: Negative  Neurological: Negative  Hematological: Negative  Psychiatric/Behavioral: Negative  Past Medical History     Patient Active Problem List   Diagnosis    Malignant neoplasm of lower-outer quadrant of left breast of female, estrogen receptor positive ([facility] [address] )     No past medical history on file    Past Surgical History:   Procedure Laterality Date    BREAST BIOPSY Left 09/17/2019    MAMMO STEREOTACTIC BREAST BIOPSY RIGHT (ALL INC) Right 9/17/2019    US GUIDANCE BREAST BIOPSY LEFT EACH ADDITIONAL Left 9/17/2019    US GUIDED BREAST BIOPSY LEFT COMPLETE Left 9/17/2019     Family History   Problem Relation Age of Onset    Prostate cancer Father 79    No Known Problems Sister     No Known Problems Paternal Aunt     Nail disease Paternal Aunt     No Known Problems Paternal Aunt      Social History     Socioeconomic History    Marital status: /Civil Union     Spouse name: Not on file    Number of children: Not on file    Years of education: Not on file    Highest education level: Not on file   Occupational History    Not on file   Social Needs    Financial resource strain: Not on file    Food insecurity:     Worry: Not on file     Inability: Not on file    Transportation needs:     Medical: Not on file     Non-medical: Not on file   Tobacco Use    Smoking status: Former Smoker     Last attempt to quit: 2010     Years since quittin 7    Smokeless tobacco: Never Used   Substance and Sexual Activity    Alcohol use:  Yes     Alcohol/week: 7 0 standard drinks     Types: 7 Standard drinks or equivalent per week    Drug use: Not on file    Sexual activity: Not on file   Lifestyle    Physical activity:     Days per week: Not on file     Minutes per session: Not on file    Stress: Not on file   Relationships    Social connections:     Talks on phone: Not on file     Gets together: Not on file     Attends Religion service: Not on file     Active member of club or organization: Not on file     Attends meetings of clubs or organizations: Not on file     Relationship status: Not on file    Intimate partner violence:     Fear of current or ex partner: Not on file     Emotionally abused: Not on file     Physically abused: Not on file     Forced sexual activity: Not on file   Other Topics Concern    Not on file   Social History Narrative    Not on file       Current Outpatient Medications:     Cholecalciferol (VITAMIN D3) 1000 units CAPS, Take by mouth, Disp: , Rfl:     cyanocobalamin (VITAMIN B-12) 100 mcg tablet, Take by mouth daily, Disp: , Rfl:     Multiple Vitamins-Minerals (CENTRUM SILVER 50+WOMEN PO), Take by mouth, Disp: , Rfl:     olmesartan (BENICAR) 20 mg tablet, Take 40 mg by mouth daily, Disp: , Rfl: 2    rosuvastatin (CRESTOR) 5 mg tablet, TAKE 1 TABLET BY ORAL ROUTE EVERY MONDAY, WEDNESDAY, FRIDAY, Disp: , Rfl: 3  No Known Allergies    Physical Exam:     Vitals:    10/17/19 1134   BP: 162/100 Pulse: (!) 112   Resp: 20   Temp: 97 9 °F (36 6 °C)     Physical Exam   Constitutional: She is oriented to person, place, and time  She appears well-developed and well-nourished  HENT:   Head: Normocephalic and atraumatic  Mouth/Throat: Oropharynx is clear and moist    Eyes: Pupils are equal, round, and reactive to light  EOM are normal    Neck: Normal range of motion  Neck supple  No JVD present  No tracheal deviation present  No thyromegaly present  Cardiovascular: Normal rate, regular rhythm, normal heart sounds and intact distal pulses  Exam reveals no gallop and no friction rub  No murmur heard  Pulmonary/Chest: Effort normal and breath sounds normal  No respiratory distress  She has no wheezes  She has no rales  Examination of both breast demonstrate well-healed biopsy marks  There are no dominant masses or axillary adenopathy  Abdominal: Soft  She exhibits no distension and no mass  There is no hepatomegaly  There is no tenderness  There is no rebound and no guarding  Musculoskeletal: Normal range of motion  She exhibits no edema or tenderness  Lymphadenopathy:     She has no cervical adenopathy  Neurological: She is alert and oriented to person, place, and time  No cranial nerve deficit  Skin: Skin is warm and dry  No rash noted  No erythema  Psychiatric: She has a normal mood and affect  Her behavior is normal    Vitals reviewed  Results & Discussion:   I reviewed the genetic results as well as a CT scan results with the patient  She has elected to not have an MRI at this point in time but will follow with her PCP  Typically I would not have ordered the CT scan except for the elevated LFTs which may be related to statins as well as an apparent alcohol consumption on her birthday  Repeat LFTs have decreased  Consequently I think it is very unlikely that she has metastatic disease sufficient to elevate her LFTs    We will proceed with surgery as outlined above         Advance Care Planning/Advance Directives:  I discussed the disease status, treatment plans and follow-up with the patient

## 2019-10-22 ENCOUNTER — TELEPHONE (OUTPATIENT)
Dept: HEMATOLOGY ONCOLOGY | Facility: CLINIC | Age: 64
End: 2019-10-22

## 2019-10-22 NOTE — TELEPHONE ENCOUNTER
Patient called to confirm her appointments with Dr Josr Benavides for 11/7/19 and Dr Piyush Lockett on 11/21/19

## 2019-10-24 NOTE — PRE-PROCEDURE INSTRUCTIONS
Pre-Surgery Instructions:   Medication Instructions    Cholecalciferol (VITAMIN D3) 1000 units CAPS Instructed patient per Anesthesia Guidelines   cyanocobalamin (VITAMIN B-12) 100 mcg tablet Instructed patient per Anesthesia Guidelines   Multiple Vitamins-Minerals (CENTRUM SILVER 50+WOMEN PO) Instructed patient per Anesthesia Guidelines   olmesartan (BENICAR) 20 mg tablet Instructed patient per Anesthesia Guidelines   oxyCODONE-acetaminophen (PERCOCET) 5-325 mg per tablet Instructed patient per Anesthesia Guidelines   rosuvastatin (CRESTOR) 5 mg tablet Instructed patient per Anesthesia Guidelines  Pre-op,meds,showering instructions reviewed  Pt  Has Hibiclens

## 2019-10-25 ENCOUNTER — TELEPHONE (OUTPATIENT)
Dept: HEMATOLOGY ONCOLOGY | Facility: CLINIC | Age: 64
End: 2019-10-25

## 2019-10-29 ENCOUNTER — HOSPITAL ENCOUNTER (OUTPATIENT)
Dept: MAMMOGRAPHY | Facility: HOSPITAL | Age: 64
Discharge: HOME | End: 2019-10-29
Attending: SURGERY
Payer: COMMERCIAL

## 2019-10-29 ENCOUNTER — ANESTHESIA (OUTPATIENT)
Dept: PERIOP | Facility: HOSPITAL | Age: 64
End: 2019-10-29
Payer: COMMERCIAL

## 2019-10-29 ENCOUNTER — ANESTHESIA EVENT (OUTPATIENT)
Dept: PERIOP | Facility: HOSPITAL | Age: 64
End: 2019-10-29
Payer: COMMERCIAL

## 2019-10-29 ENCOUNTER — DOCUMENTATION (OUTPATIENT)
Dept: HEMATOLOGY ONCOLOGY | Facility: CLINIC | Age: 64
End: 2019-10-29

## 2019-10-29 ENCOUNTER — HOSPITAL ENCOUNTER (OUTPATIENT)
Dept: NUCLEAR MEDICINE | Facility: HOSPITAL | Age: 64
Discharge: HOME | End: 2019-10-29
Attending: SURGERY
Payer: COMMERCIAL

## 2019-10-29 ENCOUNTER — HOSPITAL ENCOUNTER (OUTPATIENT)
Facility: HOSPITAL | Age: 64
Setting detail: OUTPATIENT SURGERY
Discharge: HOME | End: 2019-10-29
Attending: SURGERY | Admitting: SURGERY
Payer: COMMERCIAL

## 2019-10-29 ENCOUNTER — APPOINTMENT (OUTPATIENT)
Dept: MAMMOGRAPHY | Facility: HOSPITAL | Age: 64
End: 2019-10-29
Payer: COMMERCIAL

## 2019-10-29 VITALS — BODY MASS INDEX: 31.72 KG/M2 | HEIGHT: 61 IN | WEIGHT: 168 LBS

## 2019-10-29 VITALS
SYSTOLIC BLOOD PRESSURE: 160 MMHG | OXYGEN SATURATION: 98 % | RESPIRATION RATE: 17 BRPM | HEART RATE: 77 BPM | DIASTOLIC BLOOD PRESSURE: 72 MMHG | WEIGHT: 168 LBS | HEIGHT: 61 IN | BODY MASS INDEX: 31.72 KG/M2 | TEMPERATURE: 97 F

## 2019-10-29 DIAGNOSIS — C50.512 MALIGNANT NEOPLASM OF LOWER-OUTER QUADRANT OF LEFT BREAST OF FEMALE, ESTROGEN RECEPTOR POSITIVE (HCC): ICD-10-CM

## 2019-10-29 DIAGNOSIS — Z17.0 MALIGNANT NEOPLASM OF LOWER-OUTER QUADRANT OF LEFT BREAST OF FEMALE, ESTROGEN RECEPTOR POSITIVE (HCC): ICD-10-CM

## 2019-10-29 PROCEDURE — 38900 IO MAP OF SENT LYMPH NODE: CPT | Performed by: SURGERY

## 2019-10-29 PROCEDURE — 88342 IMHCHEM/IMCYTCHM 1ST ANTB: CPT | Performed by: PATHOLOGY

## 2019-10-29 PROCEDURE — 88307 TISSUE EXAM BY PATHOLOGIST: CPT | Performed by: PATHOLOGY

## 2019-10-29 PROCEDURE — 38525 BIOPSY/REMOVAL LYMPH NODES: CPT | Performed by: SURGERY

## 2019-10-29 PROCEDURE — 19281 PERQ DEVICE BREAST 1ST IMAG: CPT

## 2019-10-29 PROCEDURE — 38792 RA TRACER ID OF SENTINL NODE: CPT | Performed by: SURGERY

## 2019-10-29 PROCEDURE — A9541 TC99M SULFUR COLLOID: HCPCS

## 2019-10-29 PROCEDURE — 38792 RA TRACER ID OF SENTINL NODE: CPT

## 2019-10-29 PROCEDURE — 19301 PARTIAL MASTECTOMY: CPT | Performed by: SURGERY

## 2019-10-29 RX ORDER — DEXAMETHASONE SODIUM PHOSPHATE 10 MG/ML
INJECTION, SOLUTION INTRAMUSCULAR; INTRAVENOUS AS NEEDED
Status: DISCONTINUED | OUTPATIENT
Start: 2019-10-29 | End: 2019-10-29 | Stop reason: SURG

## 2019-10-29 RX ORDER — BUPIVACAINE HYDROCHLORIDE 2.5 MG/ML
INJECTION, SOLUTION EPIDURAL; INFILTRATION; INTRACAUDAL AS NEEDED
Status: DISCONTINUED | OUTPATIENT
Start: 2019-10-29 | End: 2019-10-29 | Stop reason: HOSPADM

## 2019-10-29 RX ORDER — ONDANSETRON 2 MG/ML
4 INJECTION INTRAMUSCULAR; INTRAVENOUS ONCE AS NEEDED
Status: DISCONTINUED | OUTPATIENT
Start: 2019-10-29 | End: 2019-10-29 | Stop reason: HOSPADM

## 2019-10-29 RX ORDER — FENTANYL CITRATE/PF 50 MCG/ML
25 SYRINGE (ML) INJECTION
Status: DISCONTINUED | OUTPATIENT
Start: 2019-10-29 | End: 2019-10-29 | Stop reason: HOSPADM

## 2019-10-29 RX ORDER — PROPOFOL 10 MG/ML
INJECTION, EMULSION INTRAVENOUS AS NEEDED
Status: DISCONTINUED | OUTPATIENT
Start: 2019-10-29 | End: 2019-10-29 | Stop reason: SURG

## 2019-10-29 RX ORDER — LABETALOL 20 MG/4 ML (5 MG/ML) INTRAVENOUS SYRINGE
10 ONCE
Status: COMPLETED | OUTPATIENT
Start: 2019-10-29 | End: 2019-10-29

## 2019-10-29 RX ORDER — SODIUM CHLORIDE, SODIUM LACTATE, POTASSIUM CHLORIDE, CALCIUM CHLORIDE 600; 310; 30; 20 MG/100ML; MG/100ML; MG/100ML; MG/100ML
100 INJECTION, SOLUTION INTRAVENOUS CONTINUOUS
Status: DISCONTINUED | OUTPATIENT
Start: 2019-10-29 | End: 2019-10-29 | Stop reason: HOSPADM

## 2019-10-29 RX ORDER — SODIUM CHLORIDE, SODIUM LACTATE, POTASSIUM CHLORIDE, CALCIUM CHLORIDE 600; 310; 30; 20 MG/100ML; MG/100ML; MG/100ML; MG/100ML
20 INJECTION, SOLUTION INTRAVENOUS CONTINUOUS
Status: DISCONTINUED | OUTPATIENT
Start: 2019-10-29 | End: 2019-10-29 | Stop reason: HOSPADM

## 2019-10-29 RX ORDER — LIDOCAINE HYDROCHLORIDE 10 MG/ML
INJECTION, SOLUTION INFILTRATION; PERINEURAL AS NEEDED
Status: DISCONTINUED | OUTPATIENT
Start: 2019-10-29 | End: 2019-10-29 | Stop reason: SURG

## 2019-10-29 RX ORDER — ONDANSETRON 2 MG/ML
INJECTION INTRAMUSCULAR; INTRAVENOUS AS NEEDED
Status: DISCONTINUED | OUTPATIENT
Start: 2019-10-29 | End: 2019-10-29 | Stop reason: SURG

## 2019-10-29 RX ORDER — METOCLOPRAMIDE HYDROCHLORIDE 5 MG/ML
10 INJECTION INTRAMUSCULAR; INTRAVENOUS ONCE AS NEEDED
Status: DISCONTINUED | OUTPATIENT
Start: 2019-10-29 | End: 2019-10-29 | Stop reason: HOSPADM

## 2019-10-29 RX ORDER — MIDAZOLAM HYDROCHLORIDE 1 MG/ML
INJECTION INTRAMUSCULAR; INTRAVENOUS AS NEEDED
Status: DISCONTINUED | OUTPATIENT
Start: 2019-10-29 | End: 2019-10-29 | Stop reason: SURG

## 2019-10-29 RX ORDER — CEFAZOLIN SODIUM 1 G/50ML
1000 SOLUTION INTRAVENOUS ONCE
Status: DISCONTINUED | OUTPATIENT
Start: 2019-10-29 | End: 2019-10-29 | Stop reason: HOSPADM

## 2019-10-29 RX ORDER — CEFAZOLIN SODIUM 1 G/50ML
SOLUTION INTRAVENOUS AS NEEDED
Status: DISCONTINUED | OUTPATIENT
Start: 2019-10-29 | End: 2019-10-29 | Stop reason: SURG

## 2019-10-29 RX ORDER — LIDOCAINE HYDROCHLORIDE 10 MG/ML
5 INJECTION, SOLUTION EPIDURAL; INFILTRATION; INTRACAUDAL; PERINEURAL ONCE
Status: COMPLETED | OUTPATIENT
Start: 2019-10-29 | End: 2019-10-29

## 2019-10-29 RX ORDER — FENTANYL CITRATE 50 UG/ML
INJECTION, SOLUTION INTRAMUSCULAR; INTRAVENOUS AS NEEDED
Status: DISCONTINUED | OUTPATIENT
Start: 2019-10-29 | End: 2019-10-29 | Stop reason: SURG

## 2019-10-29 RX ORDER — MAGNESIUM HYDROXIDE 1200 MG/15ML
LIQUID ORAL AS NEEDED
Status: DISCONTINUED | OUTPATIENT
Start: 2019-10-29 | End: 2019-10-29 | Stop reason: HOSPADM

## 2019-10-29 RX ORDER — OXYCODONE HYDROCHLORIDE AND ACETAMINOPHEN 5; 325 MG/1; MG/1
1 TABLET ORAL EVERY 4 HOURS PRN
Status: DISCONTINUED | OUTPATIENT
Start: 2019-10-29 | End: 2019-10-29 | Stop reason: HOSPADM

## 2019-10-29 RX ADMIN — FENTANYL CITRATE 25 MCG: 50 INJECTION INTRAMUSCULAR; INTRAVENOUS at 09:20

## 2019-10-29 RX ADMIN — MIDAZOLAM HYDROCHLORIDE 2 MG: 1 INJECTION, SOLUTION INTRAMUSCULAR; INTRAVENOUS at 09:08

## 2019-10-29 RX ADMIN — SODIUM CHLORIDE, SODIUM LACTATE, POTASSIUM CHLORIDE, AND CALCIUM CHLORIDE: .6; .31; .03; .02 INJECTION, SOLUTION INTRAVENOUS at 08:33

## 2019-10-29 RX ADMIN — FENTANYL CITRATE 25 MCG: 50 INJECTION INTRAMUSCULAR; INTRAVENOUS at 09:41

## 2019-10-29 RX ADMIN — PROPOFOL 200 MG: 10 INJECTION, EMULSION INTRAVENOUS at 09:15

## 2019-10-29 RX ADMIN — LIDOCAINE HYDROCHLORIDE 5 ML: 10 INJECTION, SOLUTION EPIDURAL; INFILTRATION; INTRACAUDAL; PERINEURAL at 08:20

## 2019-10-29 RX ADMIN — LABETALOL 20 MG/4 ML (5 MG/ML) INTRAVENOUS SYRINGE 10 MG: at 11:24

## 2019-10-29 RX ADMIN — FENTANYL CITRATE 25 MCG: 50 INJECTION INTRAMUSCULAR; INTRAVENOUS at 09:58

## 2019-10-29 RX ADMIN — ONDANSETRON 4 MG: 2 INJECTION INTRAMUSCULAR; INTRAVENOUS at 09:58

## 2019-10-29 RX ADMIN — CEFAZOLIN SODIUM 1000 MG: 1 SOLUTION INTRAVENOUS at 09:09

## 2019-10-29 RX ADMIN — FENTANYL CITRATE 25 MCG: 50 INJECTION INTRAMUSCULAR; INTRAVENOUS at 09:24

## 2019-10-29 RX ADMIN — DEXAMETHASONE SODIUM PHOSPHATE 4 MG: 10 INJECTION, SOLUTION INTRAMUSCULAR; INTRAVENOUS at 09:19

## 2019-10-29 RX ADMIN — OXYCODONE HYDROCHLORIDE AND ACETAMINOPHEN 1 TABLET: 5; 325 TABLET ORAL at 11:36

## 2019-10-29 RX ADMIN — LIDOCAINE HYDROCHLORIDE 50 MG: 10 INJECTION, SOLUTION INFILTRATION; PERINEURAL at 09:15

## 2019-10-29 NOTE — PROGRESS NOTES
recvd email regarding some charges for the [facility] 09/12/19  The charges were applied to the pts co-ins that go towards the out of pocket expenses  Called pt to go over that info but got her voicemail  left her a message to call me back   Emailed claudia singer

## 2019-10-29 NOTE — INTERVAL H&P NOTE
H&P reviewed  After examining the patient I find no changes in the patients condition since the H&P had been written      Vitals:    10/29/19 0724   BP: (!) 182/95   Pulse: 102   Resp: 18   Temp: 97 6 °F (36 4 °C)   SpO2: 99%

## 2019-10-29 NOTE — OP NOTE
OPERATIVE REPORT  PATIENT NAME: Brandin Mcfarland    :  1955  MRN: [id number]  Pt Location: AN OR ROOM 02    SURGERY DATE: 10/29/2019    Surgeon(s) and Role:     * Koffi Barros MD - Primary     * Mirtha Hughes, DO - Assisting    Preop Diagnosis:  Malignant neoplasm of lower-outer quadrant of left breast of female, estrogen receptor positive ([facility] [address] ) [C50 512, Z17 0]    Post-Op Diagnosis Codes:     * Malignant neoplasm of lower-outer quadrant of left breast of female, estrogen receptor positive ([facility] [address] ) [C50 512, Z17 0]    Procedure(s) (LRB):  BREAST NEEDLE LOCALIZED LUMPECTOMY (NEEDLE LOC AT 0800) (Left)  SENTINEL LYMPH NODE BIOPSY; LYMPHATIC MAPPING WITH BLUE DYE AND RADIOACTIVE DYE (INJECT AT 0900 BY DR PALACIOS IN THE OR) (Left)    Specimen(s):  ID Type Source Tests Collected by Time Destination   1 : left breast lumpectomy Tissue Breast, NOS TISSUE EXAM Koffi Barros MD 10/29/2019 9548    2 : left breast anterior margin green Tissue Breast, NOS TISSUE EXAM Koffi Barros MD 10/29/2019 2270    3 : left breast inferior margin blue Tissue Breast, NOS TISSUE EXAM Koffi Barros MD 10/29/2019 3853    4 : left breast lateral margin red Tissue Breast, NOS TISSUE EXAM Koffi Barros MD 10/29/2019 5302    5 : left breast medial margin yellow Tissue Breast, NOS TISSUE EXAM Koffi Barros MD 10/29/2019 0945    6 : left breast posterior margin black Tissue Breast, NOS TISSUE EXAM Koffi Barros MD 10/29/2019 0945    7 : left breast superior margin orange Tissue Breast, NOS TISSUE EXAM Koffi Barros MD 10/29/2019 0709    8 : left breast Tissue Lymph Node, [place] TISSUE EXAM Koffi Barros MD 10/29/2019 1001        Estimated Blood Loss:   Minimal    Drains:  * No LDAs found *    Anesthesia Type:   General    Operative Indications:  Malignant neoplasm of lower-outer quadrant of left breast of female, estrogen receptor positive ([facility] [address] ) [C50 512, Z17 0]      Operative Findings:  [place] lymph node was faintly blue strongly radioactive (3700 CPM), good specimen radiograph    Complications:   None    Procedure and Technique:  Lumpectomy  The patient was brought to the operation room and placed under general anesthesia  Attention was paid to ensure appropriate padding and correct positioning  The left breast was injected intradermally with 0 3cc of methylene blue followed by technetium sulfur colloid  This area was vigorously massaged to facilitate identification of the sentinel lymph node (SLN)  The breast and axillary region were then prepped and draped in a sterile fashion  I initiated a time-out, identifying the patient, the correct side and the above procedure  All parties agreed with the time out  The planned incision was then injected with 0 25% Marcaine for local anesthesia  The incision was sharply incised  The localizing wire was used to guide the dissection  Superior, inferior, medial and lateral planes were developed around the localizing wire and the specimen truncated posteriorly with electrocautery just beyond the tip of the wire  The specimen was then inked for orientation purposes  A specimen radiograph was taken in two dimensions and additional margins were removed to optimize complete removal of the tumor  The additional margins were inked on the most distal area  All specimens were sent to pathology for permanent analysis  Anterior was skin  Superficial bleeding controlled with electrocautery and the wound was extensively irrigated  The wound was closed with two layers, an inner layer of 3-0 vicryl and a subcuticular layer of 4-0 monocryl  Steri-strips were applied  SLN Biopsy  The previously marked location of the sentinel lymph node was injected with 0 25% Marcaine and epinephrine  A curivilinear incision was made and dissection was taken down into the axillar proper with electrocautery  The rob counter was used to help localize the SLN  The sentinel lymph node was identified and removed with electrocautery  SLN Findings  The SLN was blue and radioactive  The lymph node was grossly normal and sent for permanent pathologic analysis       I was present for the entire procedure    Patient Disposition:  PACU     SIGNATURE: Barak Regalado MD  DATE: October 29, 2019  TIME: 10:10 AM

## 2019-10-29 NOTE — ANESTHESIA PREPROCEDURE EVALUATION
Review of Systems/Medical History  Patient summary reviewed    No history of anesthetic complications     Cardiovascular  EKG reviewed, Negative cardio ROS Exercise tolerance (METS): >4,  Hyperlipidemia, Hypertension ,    Pulmonary  Smoker ex-smoker  ,        GI/Hepatic  Negative GI/hepatic ROS          Negative  ROS        Endo/Other  Negative endo/other ROS      GYN    Breast cancer        Hematology  Negative hematology ROS      Musculoskeletal  Negative musculoskeletal ROS        Neurology  Negative neurology ROS     Comment: vertigo Psychology   Negative psychology ROS              Physical Exam    Airway    Mallampati score: II  TM Distance: >3 FB  Neck ROM: full     Dental   No notable dental hx     Cardiovascular  Comment: Negative ROS, Rhythm: regular, Rate: normal,     Pulmonary  Breath sounds clear to auscultation,     Other Findings        Anesthesia Plan  ASA Score- 2     Anesthesia Type- general with ASA Monitors  Additional Monitors:   Airway Plan: LMA  Plan Factors-  Patient did not smoke on day of surgery  Induction- intravenous  Postoperative Plan- Plan for postoperative opioid use  Informed Consent- Anesthetic plan and risks discussed with patient  I personally reviewed this patient with the CRNA  Discussed and agreed on the Anesthesia Plan with the CRNA  Zeina Ewing

## 2019-10-29 NOTE — ANESTHESIA POSTPROCEDURE EVALUATION
Post-Op Assessment Note    CV Status:  Stable    Pain management: adequate     Mental Status:  Alert and awake   Hydration Status:  Euvolemic   PONV Controlled:  Controlled   Airway Patency:  Patent   Post Op Vitals Reviewed: Yes      Staff: CRNA           BP  140/70   Temp   98 3   Pulse  74   Resp   18   SpO2   97

## 2019-10-29 NOTE — DISCHARGE INSTRUCTIONS
POST-OPERATIVE BREAST CARE INSTRUCTIONS    Wound Closure/Dressing: Your wound is closed with:   Steri strips-white pieces of tape that hold your incision together along with surgical glue           Dissolvable sutures-underneath the skin    Wound care:     If you have gauze over your wound you may remove it the day after surgery  Leave the Steri-strips on, they will fall off on their own in 1-2 weeks   You may shower using soap and water to clean your wound  Gently pat dry  Drain Care: If you do not have a drain, disregard this section   Visiting Nursing should have been arranged upon discharge from hospital   A nurse will call your home to schedule an initial visit  Please call the number below if you have not received a call within 48 hours of hospital discharge   You may shower with the COLLIN drains  Wash area around the drains with soap and water and pat dry   Record drain outputs on chart given to you at pre op visit and bring that chart to office at post op appt   COLLIN drains can be removed in the office by a nurse either at post op visit OR when drain output is 30 ccs or less in a 24 hour period for three days in a row   If you had plastic surgery or reconstructive surgery, the plastic surgeon will manage the drains  Other:       You can begin wearing your own bra when it feels comfortable   You may resume using deodorant the day after surgery                 Post-op visit:  your post-op visit is scheduled for:  2019 @ Noon    Call our office at [phone] if you have any  of the followin  Redness, swelling, heat, unusual drainage or heavy bleeding from wound  2  Fever greater than 101 °  3  Pain not relieved by medication

## 2019-11-04 ENCOUNTER — TELEPHONE (OUTPATIENT)
Dept: SURGICAL ONCOLOGY | Facility: CLINIC | Age: 64
End: 2019-11-04

## 2019-11-04 NOTE — TELEPHONE ENCOUNTER
Post-Op phone call placed to patient  Patient denies any redness, swelling, or discharge from incision  States that the pain is well controlled without the use of any medication  Post-Op appointment with Dr Amparo Olguin verified for 11/7/19  Patient denies any questions or concerns at this time  Instructed patient to call the office if any problems arise  Patient was appreciative of the phone call

## 2019-11-07 ENCOUNTER — OFFICE VISIT (OUTPATIENT)
Dept: SURGICAL ONCOLOGY | Facility: CLINIC | Age: 64
End: 2019-11-07

## 2019-11-07 VITALS
SYSTOLIC BLOOD PRESSURE: 170 MMHG | HEIGHT: 61 IN | RESPIRATION RATE: 18 BRPM | BODY MASS INDEX: 32.47 KG/M2 | HEART RATE: 102 BPM | DIASTOLIC BLOOD PRESSURE: 108 MMHG | TEMPERATURE: 98.3 F | WEIGHT: 172 LBS

## 2019-11-07 DIAGNOSIS — Z98.890 STATUS POST LEFT BREAST LUMPECTOMY: ICD-10-CM

## 2019-11-07 DIAGNOSIS — Z17.0 MALIGNANT NEOPLASM OF LOWER-OUTER QUADRANT OF LEFT BREAST OF FEMALE, ESTROGEN RECEPTOR POSITIVE (HCC): Primary | ICD-10-CM

## 2019-11-07 DIAGNOSIS — C50.512 MALIGNANT NEOPLASM OF LOWER-OUTER QUADRANT OF LEFT BREAST OF FEMALE, ESTROGEN RECEPTOR POSITIVE (HCC): Primary | ICD-10-CM

## 2019-11-07 PROCEDURE — 99024 POSTOP FOLLOW-UP VISIT: CPT | Performed by: SURGERY

## 2019-11-07 NOTE — PROGRESS NOTES
Surgical Oncology Breast Post-Op       [address] [facility]  CANCER CARE [facility] SURGICAL ONCOLOGY [place]  [address]  [facility] [address]    Agata Lindseyop Reeder  1955  [id number]  [address] [facility] [facility]  CANCER [facility] SURGICAL ONCOLOGY [place]  [address] [address]    Chief Complaint:   Real Johnson is seen for a post-operative visit of her recent breast surgery  Oncology History:        Malignant neoplasm of lower-outer quadrant of left breast of female, estrogen receptor positive ([facility] [address] )    9/17/2019 Biopsy     Left breast ultrasound-guided biospy  A  5 o'clock, 7 cm from nipple  Invasive mammary carcinoma of no special type (ductal, not otherwise specified)  Grade 1  ER 70  MT < 1  HER2 3+    B  1 o'clock, 5 cm from nipple  Benign, fibrocystic changes without atypia  Involving intraductal micropapilloma  No evidence of malignancy    Right breast stereotactic biopsy  C  Right breast without calcifications  Benign, fibrocystic changes without atypia  No evidence of malignancy    Concordant  Unifocal; measures 2 cm on US  Left axilla negative  Right breast clear  Carcinoma with butterfly shaped clip       10/10/2019 Genetic Testing     The following genes were evaluated: MOI, BRCA1, BRCA2, CDH1, CHEK2, PALB2, PTEN, STK11, TP53  Negative result  No pathogenic sequence variants or deletions/dupllications identified  [employer]      10/29/2019 Surgery     Left breast needle localized lumpectomy with sentinel lymph node biopsy  Invasive mammary carcinoma of no special type (ductal, not otherwise specified)  Grade 3  2 2 cm  Margins negative  0/1 Lymph Nodes  Anatomic/Prognostic Stage IIA         Assessment & Plan:   Assessment/Plan    Patient presents for postoperative visit  Her tumor grade was up stage IIA grade 3  This is consistent with her HER2 positive disease  I reviewed this with Dr Genia Garcia he has recommended chemotherapy  I had previously discussed this with the patient  We have already coordinated appointment with Dr Evangelina Sims  He will coordinate a referral to Radiation Oncology when she is completing her chemotherapy  We will see her back in 3 months  She is agreeable see our advanced practitioner at that time  She knows if there are any issues or concerns that I will be available to see her  I provided her written copy of her pathology report and answered all her questions  History of Present Illness:   See Oncology History    Interval History:   See Assessment & Plan    Review of Systems:   Review of Systems   All other systems reviewed and are negative  Past Medical History:     Patient Active Problem List   Diagnosis    Malignant neoplasm of lower-outer quadrant of left breast of female, estrogen receptor positive ([facility] [address] )     Past Medical History:   Diagnosis Date    Hyperlipidemia     Hypertension     Vertigo     no medication     Past Surgical History:   Procedure Laterality Date    BREAST BIOPSY Left 09/17/2019    BREAST LUMPECTOMY Left 10/29/2019    Procedure: BREAST NEEDLE LOCALIZED LUMPECTOMY (NEEDLE LOC AT 0800); Surgeon: Kaimla Tidwell MD;  [facility];  Service: Surgical Oncology    FRACTURE SURGERY Left     surgery repair    LYMPH NODE BIOPSY Left 10/29/2019    Procedure: SENTINEL LYMPH NODE BIOPSY; LYMPHATIC MAPPING WITH BLUE DYE AND RADIOACTIVE DYE (INJECT AT 0900 BY DR PALACIOS IN THE OR);   Surgeon: Kamila Tidwell MD;  [facility];  Service: Surgical Oncology    MAMMO NEEDLE LOCALIZATION LEFT (ALL INC) Left 10/29/2019    MAMMO STEREOTACTIC BREAST BIOPSY RIGHT (ALL INC) Right 9/17/2019    US GUIDANCE BREAST BIOPSY LEFT EACH ADDITIONAL Left 9/17/2019    US GUIDED BREAST BIOPSY LEFT COMPLETE Left 9/17/2019    WISDOM TOOTH EXTRACTION Bilateral      Family History   Problem Relation Age of Onset    Prostate cancer Father 79    No Known Problems Sister     No Known Problems Paternal Aunt     Nail disease Paternal Aunt     No Known Problems Paternal Aunt      Social History     Socioeconomic History    Marital status: /Civil Union     Spouse name: Not on file    Number of children: Not on file    Years of education: Not on file    Highest education level: Not on file   Occupational History    Not on file   Social Needs    Financial resource strain: Not on file    Food insecurity:     Worry: Not on file     Inability: Not on file    Transportation needs:     Medical: Not on file     Non-medical: Not on file   Tobacco Use    Smoking status: Former Smoker     Last attempt to quit:      Years since quittin 8    Smokeless tobacco: Never Used   Substance and Sexual Activity    Alcohol use:  Yes     Alcohol/week: 7 0 standard drinks     Types: 7 Standard drinks or equivalent per week     Drinks per session: 1 or 2     Comment: daily    Drug use: Never    Sexual activity: Not on file   Lifestyle    Physical activity:     Days per week: Not on file     Minutes per session: Not on file    Stress: Not on file   Relationships    Social connections:     Talks on phone: Not on file     Gets together: Not on file     Attends L[employer]-day service: Not on file     Active member of club or organization: Not on file     Attends meetings of clubs or organizations: Not on file     Relationship status: Not on file    Intimate partner violence:     Fear of current or ex partner: Not on file     Emotionally abused: Not on file     Physically abused: Not on file     Forced sexual activity: Not on file   Other Topics Concern    Not on file   Social History Narrative    Not on file       Current Outpatient Medications:     Cholecalciferol (VITAMIN D3) 1000 units CAPS, Take 1,000 capsules by mouth daily in the early morning , Disp: , Rfl:     cyanocobalamin (VITAMIN B-12) 100 mcg tablet, Take 100 mcg by mouth daily after breakfast , Disp: , Rfl:     Multiple Vitamins-Minerals (CENTRUM SILVER 50+WOMEN PO), Take 1 tablet by mouth daily in the early morning , Disp: , Rfl:     olmesartan (BENICAR) 20 mg tablet, Take 40 mg by mouth daily in the early morning , Disp: , Rfl: 2    rosuvastatin (CRESTOR) 5 mg tablet, Take 5 mg by mouth once a week , Disp: , Rfl: 3    oxyCODONE-acetaminophen (PERCOCET) 5-325 mg per tablet, Take 1 tablet by mouth every 6 (six) hours as needed for moderate painMax Daily Amount: 4 tablets (Patient not taking: Reported on 11/7/2019), Disp: 6 tablet, Rfl: 0  No Known Allergies    Physical Exams:     Vitals:    11/07/19 1153   BP: (!) 200/110   Pulse: 102   Resp: 18   Temp: 98 3 °F (36 8 °C)     Physical Exam   Pulmonary/Chest:   Examination of the left breast demonstrates a well-healed singular incision through which both tumor and sentinel lymph node were removed  There is no evidence of infection hematoma or seroma  Results & Discussion:   I provided the patient a copy of her pathology report is outlined above  We will see her back in 3 months

## 2019-11-18 ENCOUNTER — OFFICE VISIT (OUTPATIENT)
Dept: OBGYN CLINIC | Facility: CLINIC | Age: 64
End: 2019-11-18
Payer: COMMERCIAL

## 2019-11-18 VITALS — SYSTOLIC BLOOD PRESSURE: 128 MMHG | BODY MASS INDEX: 32.42 KG/M2 | DIASTOLIC BLOOD PRESSURE: 60 MMHG | WEIGHT: 171.6 LBS

## 2019-11-18 DIAGNOSIS — N88.8 CERVICAL MASS: ICD-10-CM

## 2019-11-18 DIAGNOSIS — Z01.419 ENCOUNTER FOR GYNECOLOGICAL EXAMINATION: Primary | ICD-10-CM

## 2019-11-18 DIAGNOSIS — C50.512 MALIGNANT NEOPLASM OF LOWER-OUTER QUADRANT OF LEFT BREAST OF FEMALE, ESTROGEN RECEPTOR POSITIVE (HCC): ICD-10-CM

## 2019-11-18 DIAGNOSIS — Z17.0 MALIGNANT NEOPLASM OF LOWER-OUTER QUADRANT OF LEFT BREAST OF FEMALE, ESTROGEN RECEPTOR POSITIVE (HCC): ICD-10-CM

## 2019-11-18 PROCEDURE — 87624 HPV HI-RISK TYP POOLED RSLT: CPT | Performed by: STUDENT IN AN ORGANIZED HEALTH CARE EDUCATION/TRAINING PROGRAM

## 2019-11-18 PROCEDURE — S0610 ANNUAL GYNECOLOGICAL EXAMINA: HCPCS | Performed by: STUDENT IN AN ORGANIZED HEALTH CARE EDUCATION/TRAINING PROGRAM

## 2019-11-18 PROCEDURE — G0145 SCR C/V CYTO,THINLAYER,RESCR: HCPCS | Performed by: STUDENT IN AN ORGANIZED HEALTH CARE EDUCATION/TRAINING PROGRAM

## 2019-11-18 NOTE — PROGRESS NOTES
New patient annual exam - Gynecology   Nano Dudley 62 y o  female MRN: [id number]  227 M  [facility]  Encounter: [id number]    Chief Complaint   Patient presents with    Gynecologic Exam     yearly gyn exam last pap , last mammo 9/10/19 - breast lumpectomy 10/29  Colonoscopy 2018       History of Present Illness     Nano Dudley is a 62 y o  female  No LMP recorded  Patient is postmenopausal  since , who presents for establish care, annual exam, last exam   Concerns today: None  Hoping to get back up to speed with exams  Recently diagnosed with breast cancer, s/p lumpectomy and sentinel node biopsy, awaiting final plans for moving forward with chemotherapy and radiation  Understandably anxious regarding exam today  REVIEW OF SYSTEMS  CONSTITUTIONAL:  No weight loss, fever, chills, weakness  HEENT: No visual loss, blurred vision  SKIN: No rash or itching  CARDIOVASCULAR: No chest pain, chest pressure, or chest discomfort  RESPIRATORY: No shortness of breath, cough or sputum  GASTROINTESTINAL: No nausea, emesis, or diarrhea  NEUROLOGICAL: No headache, dizziness, syncope, paralysis  MUSCUOSKELETAL: No muscle, back pain, joint stiffness or bruising  INFECTIOUS: No fever, chills  PSYCHIATRIC: No disorder of thought or mood  HEMATOLOGIC: No easy bruising or bleeding  GYN: No postmenopausal bleeding  No involuntary urine loss  No pain with intercourse   No vaginal dryness    Past Medical History:   Diagnosis Date    Breast cancer ([facility] [address]     Hyperlipidemia     Hypertension     Vertigo     no medication       Patient Active Problem List   Diagnosis    Malignant neoplasm of lower-outer quadrant of left breast of female, estrogen receptor positive ([facility] [address] )       Past Surgical History:   Procedure Laterality Date    BREAST BIOPSY Left 2019    BREAST LUMPECTOMY Left 10/29/2019    Procedure: BREAST NEEDLE LOCALIZED LUMPECTOMY (NEEDLE LOC AT 0800); Surgeon: Janet Haines MD;  [facility];  Service: Surgical Oncology    FRACTURE SURGERY Left     surgery repair    LYMPH NODE BIOPSY Left 10/29/2019    Procedure: SENTINEL LYMPH NODE BIOPSY; LYMPHATIC MAPPING WITH BLUE DYE AND RADIOACTIVE DYE (INJECT AT 0900 BY DR PALACIOS IN THE OR); Surgeon: Janet Hainse MD;  [facility];  Service: Surgical Oncology    MAMMO NEEDLE LOCALIZATION LEFT (ALL INC) Left 10/29/2019    MAMMO STEREOTACTIC BREAST BIOPSY RIGHT (ALL INC) Right 2019    US GUIDANCE BREAST BIOPSY LEFT EACH ADDITIONAL Left 2019    US GUIDED BREAST BIOPSY LEFT COMPLETE Left 2019    WISDOM TOOTH EXTRACTION Bilateral        OB History        0    Para   0    Term   0       0    AB   0    Living   0       SAB   0    TAB   0    Ectopic   0    Multiple   0    Live Births   0           Obstetric Comments   Age of menarche 16             No OB History data found   Never been pregnant          GYN History  Menarche age 14    LMP     No history abnormal Pap smears  No history of cryotherapy, LEEP, or cold knife cone of the cervix    Health maintenance  Last pap smear: Not on file   Bone density scan: n/a  Last mammogram: 09/10/2019   Last colonoscopy: Not on file   HPV vaccination?: no    Family History   Problem Relation Age of Onset    Prostate cancer Father 79    Arthritis Father     No Known Problems Sister     No Known Problems Paternal Aunt     Nail disease Paternal Aunt     No Known Problems Paternal Aunt        Social History   Social History     Substance and Sexual Activity   Alcohol Use Yes    Alcohol/week: 7 0 standard drinks    Types: 7 Standard drinks or equivalent per week    Drinks per session: 1 or 2    Comment: daily     Social History     Substance and Sexual Activity   Drug Use Never     Social History     Tobacco Use   Smoking Status Former Smoker    Last attempt to quit:     Years since quittin 8   Smokeless Tobacco Never Used       Sexually active? occaisonally  Current sexual partner(s): wife  History of STD: no  Interested in STD screening today? no  Concerns about sex: none    Occupation:       Current Outpatient Medications:     Cholecalciferol (VITAMIN D3) 1000 units CAPS, Take 1,000 capsules by mouth daily in the early morning , Disp: , Rfl:     cyanocobalamin (VITAMIN B-12) 100 mcg tablet, Take 100 mcg by mouth daily after breakfast , Disp: , Rfl:     Multiple Vitamins-Minerals (CENTRUM SILVER 50+WOMEN PO), Take 1 tablet by mouth daily in the early morning , Disp: , Rfl:     olmesartan (BENICAR) 20 mg tablet, Take 40 mg by mouth daily in the early morning , Disp: , Rfl: 2    oxyCODONE-acetaminophen (PERCOCET) 5-325 mg per tablet, Take 1 tablet by mouth every 6 (six) hours as needed for moderate painMax Daily Amount: 4 tablets (Patient not taking: Reported on 11/7/2019), Disp: 6 tablet, Rfl: 0    rosuvastatin (CRESTOR) 5 mg tablet, Take 5 mg by mouth once a week , Disp: , Rfl: 3    No Known Allergies    Objective   Vitals: Blood pressure 128/60, weight 77 8 kg (171 lb 9 6 oz)  Body mass index is 32 42 kg/m²  General: NAD, AAOx3  Heart: RRR  Lungs: CTAB  Neck: supple, no thyromegaly or thyroid nodules appreciated    Breast: nipples everted bilaterally, no skin changes  No dimpling, redness, or erythema  Well-healing incisions on left breast and axillae  Abdomen: soft, non-distended, non tender to palpation  Extremities: non-tender to palpation    Speculum exam: Normal appearing external genitalia, normal hair distribution  Urethra well-suspended, no clitoromegaly noted  Vagina minimal rugation, pale  No discharge noted  Cervix with bulky soft mass, obscuring face of cervix, Multiloculated, friable  Appears to be protruding through the cervical os  Cervix itself small, mobile, no fullness of posterior culdesac  Pelvic exam: No cervical motion tenderness    No adnexal masses or tenderness  Anteverted uterus, normal size  Lab Results:   No visits with results within 1 Day(s) from this visit  Latest known visit with results is:   Admission on 10/29/2019, Discharged on 10/29/2019   Component Date Value    Case Report 10/29/2019                      Value:Surgical Pathology Report                         Case: [id number]                                   Authorizing Provider:  Randi Dumont MD              Collected:           10/29/2019 0943              Order[facility]     [facility]        Received:            10/29/2019 [address] [place] [address] Operating Room                                                      Pathologist:           Eris Rodgers MD                                                         Specimens:   A) - Breast, NOS, left breast lumpectomy                                                            B) - Breast, NOS, left breast inferior margin blue                                                  C) - Breast, NOS, left breast lateral margin red                                                    D) - Breast, NOS, left breast medial margin yellow                                                  E) - Breast, NOS, left breast posterior margin black                                                                          F) - Breast, NOS, left breast superior margin orange                                                G) - Lymph Node, [place], left breast                                                     Final Diagnosis 10/29/2019                      Value: This result contains rich text formatting which cannot be displayed here   Note 10/29/2019                      Value: This result contains rich text formatting which cannot be displayed here   Additional Information 10/29/2019                      Value: This result contains rich text formatting which cannot be displayed here      Synoptic Checklist 10/29/2019 Value: INVASIVE CARCINOMA OF THE BREAST  (Breast Invasive - All Specimens)                                                                                                                CLINICAL                               Radiologic Finding: Mass or architectural distortion                                                         SPECIMEN                               Procedure:    Excision (less than total mastectomy)                                Specimen Laterality:    Left                                                         TUMOR                               Tumor Site: Invasive Carcinoma:    Lower outer quadrant                                  Clock Position of Tumor Site:    5 o'clock                                Histologic Type:     Invasive carcinoma of no special type (ductal, not otherwise specified)                                Glandular (Acinar) / Tubular Differentiation:    Score 3                                Nuclear Pleomorphism:    Score 3                                Mitotic Rate:    Score 3 (>=8 mitoses per mm2)                                Overall Grade:    Grade 3 (scores of 8 or 9)                                Tumor Size:    Greatest dimension of largest invasive focus in Millimeters (mm): 22 Millimeters (mm)                               Tumor Focality:    Multiple foci of invasive carcinoma                                  Number of Foci:    4                                  Sizes of Individual Foci:    22, 2, 2, 2                                Ductal Carcinoma In Situ (DCIS):    Present                                  :    Negative for extensive intraductal component (EIC)                                  Size (Extent) of DCIS:    Estimated size of DCIS greatest dimension in Millimeters (mm) is at least: 20 Millimeters (mm)                                 Architectural Patterns:    Comedo                                  Architectural Patterns: Cribriform                                  Architectural Patterns:    Solid                                  Nuclear Grade:    Grade II (intermediate)                                  Necrosis:    Present, central (expansive "comedo" necrosis)                                Lobular Carcinoma In Situ (LCIS):    No LCIS in specimen                                Tumor Extent:                                     Nipple DCIS:    Not applicable: no nipple in submitted tissue                                Accessory Findings:                                     Lymphovascular Invasion:    Not identified                                  Dermal Lymphovascular Invasion:    Not identified                                  Microcalcifications:    Not identified                                  Treatment Effect:    No known presurgical therapy                                                         MARGINS                               Invasive Carcinoma Margins:    Uninvolved by invasive carcinoma                                  Distance from Closest Margin in Millimeters (mm):    3 Millimeters (mm)                                 Closest Margin:    Inferior                                DCIS Margins:    Uninvolved by DCIS                                  Distance of DCIS from Closest Margin in Millimeters (mm):    3 Millimeters (mm)                                 Closest Margin:    Inferior                                                         LYMPH NODES                               Regional Lymph Nodes:    Uninvolved by tumor cells                                  Number of Lymph Nodes Examined:    1                                  Number of Marydel Nodes Examined:    1                                                         PATHOLOGIC STAGE CLASSIFICATION (pTNM, AJCC 8th Edition)                               TNM Descriptors:    m (multiple foci of invasive carcinoma)                                Primary Tumor (Invasive Carcinoma) (pT):    pT2                                Regional Lymph Nodes (pN):                                     Modifier:    (sn): Only sentinel node(s) evaluated  Category (pN):    pN0                                                         ADDITIONAL FINDINGS                               Additional Pathologic Findings:    Proliferative fibrocystic changes, without atypia, including usual ductal hyperplasia  Comment(s)                             Comment(s):    see Note  Karin Enamorado Gross Description 10/29/2019                      Value: This result contains rich text formatting which cannot be displayed here   Clinical Information 10/29/2019                      Value:Anterior-green  Inferior-blue  Lateral-red  Medial-yellow  Posterior-black  Superior-orange        Assessment/Plan     62 y o  Michael Delilah with abnormal annual gynecologic examination  Diagnoses and all orders for this visit:    Encounter for gynecological examination  -     Liquid-based pap, screening    Malignant neoplasm of lower-outer quadrant of left breast of female, estrogen receptor positive ([facility] [address] )   - Well-healing incisions, follow up scheduled  Cervical mass  -     US pelvis complete w transvaginal; Future   - Discussed finding on pelvic exam today  Friable  Concern for abnormality, possible malignancy  Will need biopsy for pathology  Discussed that on exam, may in fact be from endometrium protruding through cervical os as cervix itself is small and mobile, no rectal fullness  Plan for ultrasound to better assess  In reviewing CT report and CT images, no cervical abnormalities able to be ascertained  Katherin Magallanes would like to sort out her plan with chemotherapy prior to pursuing workup and imaging and biopsy  I advised her that I would like her to have this done within a month to better assess, move forward with treatment if necessary  She expressed understanding, will contact us for appointment once ultrasound performed      Follow up PRN or for one year annual exam

## 2019-11-19 ENCOUNTER — TELEPHONE (OUTPATIENT)
Dept: SURGICAL ONCOLOGY | Facility: CLINIC | Age: 64
End: 2019-11-19

## 2019-11-19 LAB
HPV HR 12 DNA CVX QL NAA+PROBE: NEGATIVE
HPV16 DNA CVX QL NAA+PROBE: NEGATIVE
HPV18 DNA CVX QL NAA+PROBE: NEGATIVE

## 2019-11-21 ENCOUNTER — TELEPHONE (OUTPATIENT)
Dept: OBGYN CLINIC | Facility: CLINIC | Age: 64
End: 2019-11-21

## 2019-11-21 ENCOUNTER — CONSULT (OUTPATIENT)
Dept: HEMATOLOGY ONCOLOGY | Facility: CLINIC | Age: 64
End: 2019-11-21
Payer: COMMERCIAL

## 2019-11-21 ENCOUNTER — DOCUMENTATION (OUTPATIENT)
Dept: HEMATOLOGY ONCOLOGY | Facility: CLINIC | Age: 64
End: 2019-11-21

## 2019-11-21 VITALS
HEART RATE: 102 BPM | SYSTOLIC BLOOD PRESSURE: 138 MMHG | TEMPERATURE: 95.9 F | WEIGHT: 172 LBS | HEIGHT: 61 IN | RESPIRATION RATE: 18 BRPM | OXYGEN SATURATION: 98 % | BODY MASS INDEX: 32.47 KG/M2 | DIASTOLIC BLOOD PRESSURE: 98 MMHG

## 2019-11-21 DIAGNOSIS — R11.0 CHEMOTHERAPY-INDUCED NAUSEA: Primary | ICD-10-CM

## 2019-11-21 DIAGNOSIS — D70.1 CHEMOTHERAPY INDUCED NEUTROPENIA (HCC): ICD-10-CM

## 2019-11-21 DIAGNOSIS — C50.512 MALIGNANT NEOPLASM OF LOWER-OUTER QUADRANT OF LEFT BREAST OF FEMALE, ESTROGEN RECEPTOR POSITIVE (HCC): Primary | ICD-10-CM

## 2019-11-21 DIAGNOSIS — T45.1X5A CHEMOTHERAPY-INDUCED NAUSEA: Primary | ICD-10-CM

## 2019-11-21 DIAGNOSIS — T45.1X5A CHEMOTHERAPY INDUCED NEUTROPENIA (HCC): ICD-10-CM

## 2019-11-21 DIAGNOSIS — Z17.0 MALIGNANT NEOPLASM OF LOWER-OUTER QUADRANT OF LEFT BREAST OF FEMALE, ESTROGEN RECEPTOR POSITIVE (HCC): Primary | ICD-10-CM

## 2019-11-21 DIAGNOSIS — T50.905A DRUG-RELATED HAIR LOSS: Primary | ICD-10-CM

## 2019-11-21 DIAGNOSIS — L65.8 DRUG-RELATED HAIR LOSS: Primary | ICD-10-CM

## 2019-11-21 LAB
LAB AP GYN PRIMARY INTERPRETATION: NORMAL
Lab: NORMAL

## 2019-11-21 PROCEDURE — 99245 OFF/OP CONSLTJ NEW/EST HI 55: CPT | Performed by: INTERNAL MEDICINE

## 2019-11-21 RX ORDER — ONDANSETRON 4 MG/1
4 TABLET, FILM COATED ORAL EVERY 8 HOURS PRN
Qty: 30 TABLET | Refills: 1 | Status: SHIPPED | OUTPATIENT
Start: 2019-11-21 | End: 2020-06-22 | Stop reason: ALTCHOICE

## 2019-11-21 NOTE — PROGRESS NOTES
Hematology / Oncology Outpatient Consult Note    Tomasa Moctezuma 62 y o  female [id number]         Date:  11/21/2019    Assessment / Plan:  A 21-year-old postmenopausal woman with newly diagnosed stage IIA left breast cancer, grade 3, ER 70% positive, ME negative, HER2 3+ disease  She underwent lumpectomy and sentinel lymph node biopsy, resulting in GREG  She is negative for BRCA gene mutation  She presents today with her spouse to discuss adjuvant treatment options  Prior to the lumpectomy, CT scan showed multiple indeterminate liver lesion  This is relatively unlikely to be metastatic disease  However, I recommended her to have MRI of the liver which she declined  I strongly recommended her to stop drinking alcohol  We had extensive discussion regarding the diagnosis, staging information, tumor phenotype, aggressive nature of disease, curable stage, prognosis as well as treatment options  I recommended her to have adjuvant chemotherapy with TCH, consistent of Taxotere, carboplatin and trastuzumab  Side effects of this regimen was thoroughly discussed, including but not limited to alopecia, nausea, vomiting, neutropenia, risk of infection, fatigue, neuropathy, allergic reaction as well as cardiotoxicity  She understood and wished to proceed  Regarding her LFT abnormality, this is likely to be related to alcohol  I strongly recommended her to stop drinking alcohol  Based on her LFT, 20% dose reduction of Taxotere will be made  She is going to start 1st cycle of adjuvant chemotherapy in December 5, 2019  I will see her again prior to the 2nd cycle treatment  She was instructed to give us a call immediately if she has fever  All the patient and her spouse questions were answered to their satisfaction  Subjective:     HPI:  A 21-year-old postmenopausal woman who noticed a lump in her left breast which she brought to medical attention    She underwent left breast biopsy in September 17, 2019 which showed invasive ductal carcinoma, grade 1  This was ER 70% positive, VT negative, HER2 3+ disease  She underwent genetic testing which was negative for BRCA gene mutation  She underwent lumpectomy and sentinel lymph node biopsy by Dr Cheryl Dalal  Prior to this, she had CT scan of chest abdomen pelvis which showed no obvious distant metastasis  However, she has multiple indeterminate liver lesions  Her LFT is mildly elevated  She has no history of viral hepatitis  However, she drinks 2 alcohol every day for nearly 40 years  Intermittently, she drinks more  Lumpectomy showed 2 2 cm of invasive ductal carcinoma, grade 3  There was no evidence of lymphovascular invasion  1 sentinel lymph node was negative for metastatic disease  She presents today to discuss adjuvant treatment options  She has no complaint of pain  Her weight is stable  She has no respiratory symptoms  She has no family history of breast or ovarian cancer  She quit smoking nearly 10 years ago  Her performance status is normal         Interval History:          Objective:     Primary Diagnosis:    1  Left breast cancer, stage IIA (pT2, pN0, M0) grade 3, ER 70% positive, VT negative, HER2 3+ disease  Diagnosed in October 2019      2  BRCA gene mutation negative  Cancer Staging:  Cancer Staging  Malignant neoplasm of lower-outer quadrant of left breast of female, estrogen receptor positive ([facility] [address] )  Staging form: Breast, AJCC 8th Edition  - Pathologic: Stage IIA (pT2, pN0(sn), cM0, G3, ER+, VT-, HER2+) - Unsigned  Neoadjuvant therapy: No  Laterality: Left  Tumor size (mm): 22  Method of lymph node assessment: Van lymph node biopsy  Histologic grading system: 3 grade system        Previous Hematologic/ Oncologic Treatment:         Current Hematologic/ Oncologic Treatment:      Adjuvant chemotherapy with TCH x6 cycle  1st cycle to be given in December 5, 2019       Disease Status:     GREG is status post lumpectomy and sentinel lymph node biopsy  Test Results:    Pathology:    2 2 cm of invasive ductal carcinoma, grade 3  No evidence of lymphovascular invasion  1 sentinel lymph node was negative for metastatic disease  ER 70% positive, MT negative, HER2 3+ disease  Stage IIA (pT2, pN0, M0)    Radiology:    CT scan of chest abdomen pelvis showed multiple indeterminate liver lesion, likely to be cyst   MRI of the liver was recommended  Laboratory:        Otherwise see below  Physical Exam:      General Appearance:    Alert, oriented        Eyes:    PERRL   Ears:    Normal external ear canals, both ears   Nose:   Nares normal, septum midline   Throat:   Mucosa moist  Pharynx without injection  Neck:   Supple       Lungs:     Clear to auscultation bilaterally   Chest Wall:    No tenderness or deformity    Heart:    Regular rate and rhythm       Abdomen:     Soft, non-tender, bowel sounds +, liver is palpable 3 cm below right costal margin  Spleen is not palpable  Extremities:   Extremities no cyanosis or edema       Skin:   no rash or icterus  Lymph nodes:   Cervical, supraclavicular, and axillary nodes normal   Neurologic:   CNII-XII intact, normal strength, sensation and reflexes     Throughout          Breast exam:   Lumpectomy scar at 6 o'clock position in her left breast with no palpable abnormalities  Right breast exam is negative  ROS: Review of Systems   All other systems reviewed and are negative  Imaging: Mammo Needle Localization Left ([employer])    Result Date: 10/29/2019  Narrative: DIAGNOSIS: Malignant neoplasm of lower-outer quadrant of left breast of female, estrogen receptor positive ([facility] [address] ) RELEVANT HISTORY: Family Breast Cancer History: No known family history of breast cancer  Family Medical History: No known relevant family medical history  Personal History: No known relevant hormone history  Surgical history includes breast biopsy   No known relevant medical history  COMPARISONS: None available  INDICATION: Denver Aid is a 62 y o  female presenting for Malignant neoplasm of lower-outer quadrant of left breast of female, estrogen receptor positive ([address] ) Staging  FINDINGS:   Prior mammograms from September 17, 2019 were reviewed  The nodule in the inferior left breast is targeted today  Final time out procedure was performed as per protocol  Procedure: Left breast was placed in compression in an open alphanumeric grid guidance paddle in a caudal to cranial  The clip and nodule was identified  Under standard sterile technique and local anesthesia a 3 cm long Kopans style hookwire needle was advanced  Orthogonal images were obtained  The hookwire was deployed and the needle removed  A final confirmatory 90 degree medial to lateral projection was obtained and demonstrates satisfactory positioning  Appropriate images were labeled and saved to PACs  Patient tolerated the procedure well  The hookwire needle was secured in standard fashion  Impression:  Status post successful hookwire localization  RECOMMENDATION: There are no mammo recommendations for this study  Workstation ID: [id number]     Nm Sentinal Node Inj    Result Date: 10/29/2019  Narrative: SENTINEL NODE INJECTION INDICATION:  C50 512: Malignant neoplasm of lower-outer quadrant of left female breast Z17 0: Estrogen receptor positive status (ER+)  FINDINGS: 0 48 mCi Tc-99m sulfur colloid (0 6 cc volume) was administered intradermally into the left breast by JUSTINA PALACIOS in the OR  No imaging was performed  Impression: Injection of  0 48 mCi Tc-99m sulfur colloid into the left breast in the OR   Workstation performed: [id number]         Labs:   Lab Results   Component Value Date    WBC 6 27 10/03/2019    HGB 14 3 10/03/2019    HCT 44 7 10/03/2019    MCV 98 10/03/2019     10/03/2019     Lab Results   Component Value Date    K 4 0 10/08/2019     10/08/2019    CO2 26 10/08/2019    BUN 11 10/08/2019    CREATININE 0 66 10/08/2019    GLUF 102 (H) 10/08/2019    CALCIUM 9 2 10/08/2019    AST 70 (H) 10/08/2019     (H) 10/08/2019    ALKPHOS 83 10/08/2019    EGFR 95 10/08/2019             Vital Sign:    Body surface area is 1 77 meters squared  Wt Readings from Last 3 Encounters:   11/21/19 78 kg (172 lb)   11/18/19 77 8 kg (171 lb 9 6 oz)   11/07/19 78 kg (172 lb)        Temp Readings from Last 3 Encounters:   11/21/19 (!) 95 9 °F (35 5 °C) (Tympanic Core)   11/07/19 98 3 °F (36 8 °C)   10/29/19 (!) 97 °F (36 1 °C)        BP Readings from Last 3 Encounters:   11/21/19 138/98   11/18/19 128/60   11/07/19 (!) 170/108         Pulse Readings from Last 3 Encounters:   11/21/19 102   11/07/19 102   10/29/19 77     @LASTSAO2(3)@    Active Problems:   Patient Active Problem List   Diagnosis    Malignant neoplasm of lower-outer quadrant of left breast of female, estrogen receptor positive ([facility] [address] )    Chemotherapy induced neutropenia ([address] )       Past Medical History:   Past Medical History:   Diagnosis Date    Breast cancer ([facility] [address] )     Hyperlipidemia     Hypertension     Vertigo     no medication       Surgical History:   Past Surgical History:   Procedure Laterality Date    BREAST BIOPSY Left 09/17/2019    BREAST LUMPECTOMY Left 10/29/2019    Procedure: BREAST NEEDLE LOCALIZED LUMPECTOMY (NEEDLE LOC AT 0800); Surgeon: Tami Gabriel MD;  [facility];  Service: Surgical Oncology    FRACTURE SURGERY Left     surgery repair    LYMPH NODE BIOPSY Left 10/29/2019    Procedure: SENTINEL LYMPH NODE BIOPSY; LYMPHATIC MAPPING WITH BLUE DYE AND RADIOACTIVE DYE (INJECT AT 0900 BY DR PALACIOS IN THE OR);   Surgeon: Tami Gabriel MD;  [facility];  Service: Surgical Oncology    MAMMO NEEDLE LOCALIZATION LEFT ([employer]) Left 10/29/2019    MAMMO STEREOTACTIC BREAST BIOPSY RIGHT ([employer]) Right 9/17/2019    US GUIDANCE BREAST BIOPSY LEFT EACH ADDITIONAL Left 9/17/2019    US GUIDED BREAST BIOPSY LEFT COMPLETE Left 2019    WISDOM TOOTH EXTRACTION Bilateral        Family History:    Family History   Problem Relation Age of Onset    Prostate cancer Father 79    Arthritis Father     No Known Problems Sister     No Known Problems Paternal Aunt     Nail disease Paternal Aunt     No Known Problems Paternal Aunt        Cancer-related family history includes Prostate cancer (age of onset: 79) in her father  Social History:   Social History     Socioeconomic History    Marital status: /Civil Union     Spouse name: Not on file    Number of children: Not on file    Years of education: Not on file    Highest education level: Not on file   Occupational History    Not on file   Social Needs    Financial resource strain: Not on file    Food insecurity:     Worry: Not on file     Inability: Not on file    Transportation needs:     Medical: Not on file     Non-medical: Not on file   Tobacco Use    Smoking status: Former Smoker     Last attempt to quit:      Years since quittin 8    Smokeless tobacco: Never Used   Substance and Sexual Activity    Alcohol use:  Yes     Alcohol/week: 7 0 standard drinks     Types: 7 Standard drinks or equivalent per week     Drinks per session: 1 or 2     Comment: daily    Drug use: Never    Sexual activity: Yes     Partners: Female     Birth control/protection: None, Post-menopausal   Lifestyle    Physical activity:     Days per week: Not on file     Minutes per session: Not on file    Stress: Not on file   Relationships    Social connections:     Talks on phone: Not on file     Gets together: Not on file     Attends Scientologist service: Not on file     Active member of club or organization: Not on file     Attends meetings of clubs or organizations: Not on file     Relationship status: Not on file    Intimate partner violence:     Fear of current or ex partner: Not on file     Emotionally abused: Not on file     Physically abused: Not on file     Forced sexual activity: Not on file   Other Topics Concern    Not on file   Social History Narrative    Not on file       Current Medications:   Current Outpatient Medications   Medication Sig Dispense Refill    Cholecalciferol (VITAMIN D3) 1000 units CAPS Take 1,000 capsules by mouth daily in the early morning       cyanocobalamin (VITAMIN B-12) 100 mcg tablet Take 100 mcg by mouth daily after breakfast       Multiple Vitamins-Minerals (CENTRUM SILVER 50+WOMEN PO) Take 1 tablet by mouth daily in the early morning       olmesartan (BENICAR) 20 mg tablet Take 40 mg by mouth daily in the early morning   2    oxyCODONE-acetaminophen (PERCOCET) 5-325 mg per tablet Take 1 tablet by mouth every 6 (six) hours as needed for moderate painMax Daily Amount: 4 tablets 6 tablet 0    rosuvastatin (CRESTOR) 5 mg tablet Take 5 mg by mouth once a week   3     No current facility-administered medications for this visit          Allergies: No Known Allergies

## 2019-11-21 NOTE — LETTER
November 21, 2019     Chantell Loomis MD  [facility]  [address]    Patient: Arnoldo Augustin   YOB: 1955   Date of Visit: 11/21/2019       Dear Dr Dunia Resendiz:    Thank you for referring Preciouselisa Dominguez to me for evaluation  Below are my notes for this consultation  If you have questions, please do not hesitate to call me  I look forward to following your patient along with you  Sincerely,        Reyna Jeter MD        CC: MD Reyna Santos MD  11/21/2019  9:02 AM  Sign at close encounter  Hematology / Oncology Outpatient Consult Note    Arnoldo Augustin 62 y o  female [id number]         Date:  11/21/2019    Assessment / Plan:  A 61-year-old postmenopausal woman with newly diagnosed stage IIA left breast cancer, grade 3, ER 70% positive, [place] negative, HER2 3+ disease  She underwent lumpectomy and sentinel lymph node biopsy, resulting in GREG  She is negative for BRCA gene mutation  She presents today with her spouse to discuss adjuvant treatment options  Prior to the lumpectomy, CT scan showed multiple indeterminate liver lesion  This is relatively unlikely to be metastatic disease  However, I recommended her to have MRI of the liver which she declined  I strongly recommended her to stop drinking alcohol  We had extensive discussion regarding the diagnosis, staging information, tumor phenotype, aggressive nature of disease, curable stage, prognosis as well as treatment options  I recommended her to have adjuvant chemotherapy with TCH, consistent of Taxotere, carboplatin and trastuzumab  Side effects of this regimen was thoroughly discussed, including but not limited to alopecia, nausea, vomiting, neutropenia, risk of infection, fatigue, neuropathy, allergic reaction as well as cardiotoxicity  She understood and wished to proceed  Regarding her LFT abnormality, this is likely to be related to alcohol    I strongly recommended her to stop drinking alcohol  Based on her LFT, 20% dose reduction of Taxotere will be made  She is going to start 1st cycle of adjuvant chemotherapy in December 5, 2019  I will see her again prior to the 2nd cycle treatment  She was instructed to give us a call immediately if she has fever  All the patient and her spouse questions were answered to their satisfaction  Subjective:     HPI:  A 61-year-old postmenopausal woman who noticed a lump in her left breast which she brought to medical attention  She underwent left breast biopsy in September 17, 2019 which showed invasive ductal carcinoma, grade 1  This was ER 70% positive, TX negative, HER2 3+ disease  She underwent genetic testing which was negative for BRCA gene mutation  She underwent lumpectomy and sentinel lymph node biopsy by Dr Gisel Zhang  Prior to this, she had CT scan of chest abdomen pelvis which showed no obvious distant metastasis  However, she has multiple indeterminate liver lesions  Her LFT is mildly elevated  She has no history of viral hepatitis  However, she drinks 2 alcohol every day for nearly 40 years  Intermittently, she drinks more  Lumpectomy showed 2 2 cm of invasive ductal carcinoma, grade 3  There was no evidence of lymphovascular invasion  1 sentinel lymph node was negative for metastatic disease  She presents today to discuss adjuvant treatment options  She has no complaint of pain  Her weight is stable  She has no respiratory symptoms  She has no family history of breast or ovarian cancer  She quit smoking nearly 10 years ago  Her performance status is normal         Interval History:          Objective:     Primary Diagnosis:    1  Left breast cancer, stage IIA (pT2, pN0, M0) grade 3, ER 70% positive, TX negative, HER2 3+ disease  Diagnosed in October 2019      2  BRCA gene mutation negative      Cancer Staging:  Cancer Staging  Malignant neoplasm of lower-outer quadrant of left breast of female, estrogen receptor positive ([facility] [address] )  Staging form: Breast, AJCC 8th Edition  - Pathologic: Stage IIA (pT2, pN0(sn), cM0, G3, ER+, SD-, HER2+) - Unsigned  Neoadjuvant therapy: No  Laterality: Left  Tumor size (mm): 22  Method of lymph node assessment: [place] lymph node biopsy  Histologic grading system: 3 grade system        Previous Hematologic/ Oncologic Treatment:         Current Hematologic/ Oncologic Treatment:      Adjuvant chemotherapy with TCH x6 cycle  1st cycle to be given in December 5, 2019  Disease Status:     GREG is status post lumpectomy and sentinel lymph node biopsy  Test Results:    Pathology:    2 2 cm of invasive ductal carcinoma, grade 3  No evidence of lymphovascular invasion  1 sentinel lymph node was negative for metastatic disease  ER 70% positive, SD negative, HER2 3+ disease  Stage IIA (pT2, pN0, M0)    Radiology:    CT scan of chest abdomen pelvis showed multiple indeterminate liver lesion, likely to be cyst   MRI of the liver was recommended  Laboratory:        Otherwise see below  Physical Exam:      General Appearance:    Alert, oriented        Eyes:    PERRL   Ears:    Normal external ear canals, both ears   Nose:   Nares normal, septum midline   Throat:   Mucosa moist  Pharynx without injection  Neck:   Supple       Lungs:     Clear to auscultation bilaterally   Chest Wall:    No tenderness or deformity    Heart:    Regular rate and rhythm       Abdomen:     Soft, non-tender, bowel sounds +, liver is palpable 3 cm below right costal margin  Spleen is not palpable  Extremities:   Extremities no cyanosis or edema       Skin:   no rash or icterus  Lymph nodes:   Cervical, supraclavicular, and axillary nodes normal   Neurologic:   CNII-XII intact, normal strength, sensation and reflexes     Throughout          Breast exam:   Lumpectomy scar at 6 o'clock position in her left breast with no palpable abnormalities    Right breast exam is negative  ROS: Review of Systems   All other systems reviewed and are negative  Imaging: Mammo Needle Localization Left (all Inc)    Result Date: 10/29/2019  Narrative: DIAGNOSIS: Malignant neoplasm of lower-outer quadrant of left breast of female, estrogen receptor positive ([facility] [address] ) RELEVANT HISTORY: Family Breast Cancer History: No known family history of breast cancer  Family Medical History: No known relevant family medical history  Personal History: No known relevant hormone history  Surgical history includes breast biopsy  No known relevant medical history  COMPARISONS: None available  INDICATION: Mark Banegas is a 62 y o  female presenting for Malignant neoplasm of lower-outer quadrant of left breast of female, estrogen receptor positive ([facility] [address] ) Staging  FINDINGS:   Prior mammograms from September 17, 2019 were reviewed  The nodule in the inferior left breast is targeted today  Final time out procedure was performed as per protocol  Procedure: Left breast was placed in compression in an open alphanumeric grid guidance paddle in a caudal to cranial  The clip and nodule was identified  Under standard sterile technique and local anesthesia a 3 cm long Kopans style hookwire needle was advanced  Orthogonal images were obtained  The hookwire was deployed and the needle removed  A final confirmatory 90 degree medial to lateral projection was obtained and demonstrates satisfactory positioning  Appropriate images were labeled and saved to PACs  Patient tolerated the procedure well  The hookwire needle was secured in standard fashion  Impression:  Status post successful hookwire localization  RECOMMENDATION: There are no mammo recommendations for this study   Workstation ID: [id number]     Nm Sentinal Node Inj    Result Date: 10/29/2019  Narrative: SENTINEL NODE INJECTION INDICATION:  C50 512: Malignant neoplasm of lower-outer quadrant of left female breast Z17 0: Estrogen receptor positive status (ER+)  FINDINGS: 0 48 mCi Tc-99m sulfur colloid (0 6 cc volume) was administered intradermally into the left breast by JUSTINA PALACIOS in the OR  No imaging was performed  Impression: Injection of  0 48 mCi Tc-99m sulfur colloid into the left breast in the OR  Workstation performed: [id number]         Labs:   Lab Results   Component Value Date    WBC 6 27 10/03/2019    HGB 14 3 10/03/2019    HCT 44 7 10/03/2019    MCV 98 10/03/2019     10/03/2019     Lab Results   Component Value Date    K 4 0 10/08/2019     10/08/2019    CO2 26 10/08/2019    BUN 11 10/08/2019    CREATININE 0 66 10/08/2019    GLUF 102 (H) 10/08/2019    CALCIUM 9 2 10/08/2019    AST 70 (H) 10/08/2019     (H) 10/08/2019    ALKPHOS 83 10/08/2019    EGFR 95 10/08/2019             Vital Sign:    Body surface area is 1 77 meters squared  Wt Readings from Last 3 Encounters:   11/21/19 78 kg (172 lb)   11/18/19 77 8 kg (171 lb 9 6 oz)   11/07/19 78 kg (172 lb)        Temp Readings from Last 3 Encounters:   11/21/19 (!) 95 9 °F (35 5 °C) (Tympanic Core)   11/07/19 98 3 °F (36 8 °C)   10/29/19 (!) 97 °F (36 1 °C)        BP Readings from Last 3 Encounters:   11/21/19 138/98   11/18/19 128/60   11/07/19 (!) 170/108         Pulse Readings from Last 3 Encounters:   11/21/19 102   11/07/19 102   10/29/19 77     @LASTSAO2(3)@    Active Problems:   Patient Active Problem List   Diagnosis    Malignant neoplasm of lower-outer quadrant of left breast of female, estrogen receptor positive ([address] )    Chemotherapy induced neutropenia ([address] )       Past Medical History:   Past Medical History:   Diagnosis Date    Breast cancer ([address] )     Hyperlipidemia     Hypertension     Vertigo     no medication       Surgical History:   Past Surgical History:   Procedure Laterality Date    BREAST BIOPSY Left 09/17/2019    BREAST LUMPECTOMY Left 10/29/2019    Procedure: BREAST NEEDLE LOCALIZED LUMPECTOMY (NEEDLE LOC AT 0800);   Surgeon: Mike Herrera MD;  [facility]: [facility];  Service: Surgical Oncology    FRACTURE SURGERY Left     surgery repair    LYMPH NODE BIOPSY Left 10/29/2019    Procedure: SENTINEL LYMPH NODE BIOPSY; LYMPHATIC MAPPING WITH BLUE DYE AND RADIOACTIVE DYE (INJECT AT 0900 BY DR PALACIOS IN THE OR); Surgeon: Darrin Vazquez MD;  [facility];  Service: Surgical Oncology    MAMMO NEEDLE LOCALIZATION LEFT (ALL [employer]) Left 10/29/2019    MAMMO STEREOTACTIC BREAST BIOPSY RIGHT ([employer]) Right 2019    US GUIDANCE BREAST BIOPSY LEFT EACH ADDITIONAL Left 2019    US GUIDED BREAST BIOPSY LEFT COMPLETE Left 2019    WISDOM TOOTH EXTRACTION Bilateral        Family History:    Family History   Problem Relation Age of Onset    Prostate cancer Father 79    Arthritis Father     No Known Problems Sister     No Known Problems Paternal Aunt     Nail disease Paternal Aunt     No Known Problems Paternal Aunt        Cancer-related family history includes Prostate cancer (age of onset: 79) in her father  Social History:   Social History     Socioeconomic History    Marital status: /Civil Union     Spouse name: Not on file    Number of children: Not on file    Years of education: Not on file    Highest education level: Not on file   Occupational History    Not on file   Social Needs    Financial resource strain: Not on file    Food insecurity:     Worry: Not on file     Inability: Not on file    Transportation needs:     Medical: Not on file     Non-medical: Not on file   Tobacco Use    Smoking status: Former Smoker     Last attempt to quit: 2010     Years since quittin 8    Smokeless tobacco: Never Used   Substance and Sexual Activity    Alcohol use:  Yes     Alcohol/week: 7 0 standard drinks     Types: 7 Standard drinks or equivalent per week     Drinks per session: 1 or 2     Comment: daily    Drug use: Never    Sexual activity: Yes     Partners: Female     Birth control/protection: None, Post-menopausal   Lifestyle    Physical activity:     Days per week: Not on file     Minutes per session: Not on file    Stress: Not on file   Relationships    Social connections:     Talks on phone: Not on file     Gets together: Not on file     Attends [employer]ical service: Not on file     Active member of club or organization: Not on file     Attends meetings of clubs or organizations: Not on file     Relationship status: Not on file    Intimate partner violence:     Fear of current or ex partner: Not on file     Emotionally abused: Not on file     Physically abused: Not on file     Forced sexual activity: Not on file   Other Topics Concern    Not on file   Social History Narrative    Not on file       Current Medications:   Current Outpatient Medications   Medication Sig Dispense Refill    Cholecalciferol (VITAMIN D3) 1000 units CAPS Take 1,000 capsules by mouth daily in the early morning       cyanocobalamin (VITAMIN B-12) 100 mcg tablet Take 100 mcg by mouth daily after breakfast       Multiple Vitamins-Minerals (CENTRUM SILVER 50+WOMEN PO) Take 1 tablet by mouth daily in the early morning       olmesartan (BENICAR) 20 mg tablet Take 40 mg by mouth daily in the early morning   2    oxyCODONE-acetaminophen (PERCOCET) 5-325 mg per tablet Take 1 tablet by mouth every 6 (six) hours as needed for moderate painMax Daily Amount: 4 tablets 6 tablet 0    rosuvastatin (CRESTOR) 5 mg tablet Take 5 mg by mouth once a week   3     No current facility-administered medications for this visit          Allergies: No Known Allergies

## 2019-11-21 NOTE — PROGRESS NOTES
Patient newly diagnosed stage IIA left breast cancer, grade 3, ER 70% positive, [place] negative, HER2 3+ disease  She underwent lumpectomy and sentinel lymph node biopsy, resulting in GREG  She is negative for BRCA gene mutation  Prior to the lumpectomy, CT scan showed multiple indeterminate liver lesion  This is relatively unlikely to be metastatic disease  Dr Jaskaran Neves had extensive discussion regarding the diagnosis, staging information, tumor phenotype, aggressive nature of disease, curable stage, prognosis as well as treatment options  She will have adjuvant chemotherapy with TCH, consistent of Taxotere, carboplatin and trastuzumab  Side effects of this regimen was thoroughly discussed by Dr Jaskaran Neves, including but not limited to alopecia, nausea, vomiting, neutropenia, risk of infection, fatigue, neuropathy, allergic reaction as well as cardiotoxicity  She understood and wished to proceed  Regarding her LFT abnormality, this is likely to be related to alcohol  Dr Jaskaran Neves recommended her to stop drinking alcohol  She will have echo on 11/26, port placement on 12/3, and start chemotherapy on 12/6 at the [address] [place] [address] Drive  She was instructed to give us a call immediately if she has fever  I discussed other medications, such as imodium, lomotil, magic mouthwash, zofran, compazine, reglan, emla cream, etc  Wig script provided to patient in case she would like a wig  Script for Zofran sent to patient's pharmacy on file  Consent signed

## 2019-11-21 NOTE — H&P (VIEW-ONLY)
Hematology / Oncology Outpatient Consult Note    Sera De La Cruz 62 y o  female [id number]         Date:  11/21/2019    Assessment / Plan:  A 81-year-old postmenopausal woman with newly diagnosed stage IIA left breast cancer, grade 3, ER 70% positive, AK negative, HER2 3+ disease  She underwent lumpectomy and sentinel lymph node biopsy, resulting in GREG  She is negative for BRCA gene mutation  She presents today with her spouse to discuss adjuvant treatment options  Prior to the lumpectomy, CT scan showed multiple indeterminate liver lesion  This is relatively unlikely to be metastatic disease  However, I recommended her to have MRI of the liver which she declined  I strongly recommended her to stop drinking alcohol  We had extensive discussion regarding the diagnosis, staging information, tumor phenotype, aggressive nature of disease, curable stage, prognosis as well as treatment options  I recommended her to have adjuvant chemotherapy with TCH, consistent of Taxotere, carboplatin and trastuzumab  Side effects of this regimen was thoroughly discussed, including but not limited to alopecia, nausea, vomiting, neutropenia, risk of infection, fatigue, neuropathy, allergic reaction as well as cardiotoxicity  She understood and wished to proceed  Regarding her LFT abnormality, this is likely to be related to alcohol  I strongly recommended her to stop drinking alcohol  Based on her LFT, 20% dose reduction of Taxotere will be made  She is going to start 1st cycle of adjuvant chemotherapy in December 5, 2019  I will see her again prior to the 2nd cycle treatment  She was instructed to give us a call immediately if she has fever  All the patient and her spouse questions were answered to their satisfaction  Subjective:     HPI:  A 81-year-old postmenopausal woman who noticed a lump in her left breast which she brought to medical attention    She underwent left breast biopsy in September 17, 2019 which showed invasive ductal carcinoma, grade 1  This was ER 70% positive, OK negative, HER2 3+ disease  She underwent genetic testing which was negative for BRCA gene mutation  She underwent lumpectomy and sentinel lymph node biopsy by Dr Elizabeth Nowak  Prior to this, she had CT scan of chest abdomen pelvis which showed no obvious distant metastasis  However, she has multiple indeterminate liver lesions  Her LFT is mildly elevated  She has no history of viral hepatitis  However, she drinks 2 alcohol every day for nearly 40 years  Intermittently, she drinks more  Lumpectomy showed 2 2 cm of invasive ductal carcinoma, grade 3  There was no evidence of lymphovascular invasion  1 sentinel lymph node was negative for metastatic disease  She presents today to discuss adjuvant treatment options  She has no complaint of pain  Her weight is stable  She has no respiratory symptoms  She has no family history of breast or ovarian cancer  She quit smoking nearly 10 years ago  Her performance status is normal         Interval History:          Objective:     Primary Diagnosis:    1  Left breast cancer, stage IIA (pT2, pN0, M0) grade 3, ER 70% positive, OK negative, HER2 3+ disease  Diagnosed in October 2019      2  BRCA gene mutation negative  Cancer Staging:  Cancer Staging  Malignant neoplasm of lower-outer quadrant of left breast of female, estrogen receptor positive ([facility] [address] )  Staging form: Breast, AJCC 8th Edition  - Pathologic: Stage IIA (pT2, pN0(sn), cM0, G3, ER+, OK-, HER2+) - Unsigned  Neoadjuvant therapy: No  Laterality: Left  Tumor size (mm): 22  Method of lymph node assessment: [place] lymph node biopsy  Histologic grading system: 3 grade system        Previous Hematologic/ Oncologic Treatment:         Current Hematologic/ Oncologic Treatment:      Adjuvant chemotherapy with TCH x6 cycle  1st cycle to be given in December 5, 2019       Disease Status:     GREG is status post lumpectomy and sentinel lymph node biopsy  Test Results:    Pathology:    2 2 cm of invasive ductal carcinoma, grade 3  No evidence of lymphovascular invasion  1 sentinel lymph node was negative for metastatic disease  ER 70% positive, ME negative, HER2 3+ disease  Stage IIA (pT2, pN0, M0)    Radiology:    CT scan of chest abdomen pelvis showed multiple indeterminate liver lesion, likely to be cyst   MRI of the liver was recommended  Laboratory:        Otherwise see below  Physical Exam:      General Appearance:    Alert, oriented        Eyes:    PERRL   Ears:    Normal external ear canals, both ears   Nose:   Nares normal, septum midline   Throat:   Mucosa moist  Pharynx without injection  Neck:   Supple       Lungs:     Clear to auscultation bilaterally   Chest Wall:    No tenderness or deformity    Heart:    Regular rate and rhythm       Abdomen:     Soft, non-tender, bowel sounds +, liver is palpable 3 cm below right costal margin  Spleen is not palpable  Extremities:   Extremities no cyanosis or edema       Skin:   no rash or icterus  Lymph nodes:   Cervical, supraclavicular, and axillary nodes normal   Neurologic:   CNII-XII intact, normal strength, sensation and reflexes     Throughout          Breast exam:   Lumpectomy scar at 6 o'clock position in her left breast with no palpable abnormalities  Right breast exam is negative  ROS: Review of Systems   All other systems reviewed and are negative  Imaging: Mammo Needle Localization Left (all Inc)    Result Date: 10/29/2019  Narrative: DIAGNOSIS: Malignant neoplasm of lower-outer quadrant of left breast of female, estrogen receptor positive ([facility] [address] ) RELEVANT HISTORY: Family Breast Cancer History: No known family history of breast cancer  Family Medical History: No known relevant family medical history  Personal History: No known relevant hormone history  Surgical history includes breast biopsy   No known relevant medical history  COMPARISONS: None available  INDICATION: Sebastián Florentino is a 62 y o  female presenting for Malignant neoplasm of lower-outer quadrant of left breast of female, estrogen receptor positive ([address] ) Staging  FINDINGS:   Prior mammograms from September 17, 2019 were reviewed  The nodule in the inferior left breast is targeted today  Final time out procedure was performed as per protocol  Procedure: Left breast was placed in compression in an open alphanumeric grid guidance paddle in a caudal to cranial  The clip and nodule was identified  Under standard sterile technique and local anesthesia a 3 cm long Kopans style hookwire needle was advanced  Orthogonal images were obtained  The hookwire was deployed and the needle removed  A final confirmatory 90 degree medial to lateral projection was obtained and demonstrates satisfactory positioning  Appropriate images were labeled and saved to PACs  Patient tolerated the procedure well  The hookwire needle was secured in standard fashion  Impression:  Status post successful hookwire localization  RECOMMENDATION: There are no mammo recommendations for this study  Workstation ID: [id number]     Nm Sentinal Node Inj    Result Date: 10/29/2019  Narrative: SENTINEL NODE INJECTION INDICATION:  C50 512: Malignant neoplasm of lower-outer quadrant of left female breast Z17 0: Estrogen receptor positive status (ER+)  FINDINGS: 0 48 mCi Tc-99m sulfur colloid (0 6 cc volume) was administered intradermally into the left breast by JUSTINA PALACIOS in the OR  No imaging was performed  Impression: Injection of  0 48 mCi Tc-99m sulfur colloid into the left breast in the OR   Workstation performed: [id number]         Labs:   Lab Results   Component Value Date    WBC 6 27 10/03/2019    HGB 14 3 10/03/2019    HCT 44 7 10/03/2019    MCV 98 10/03/2019     10/03/2019     Lab Results   Component Value Date    K 4 0 10/08/2019     10/08/2019    CO2 26 10/08/2019    BUN 11 10/08/2019    CREATININE 0 66 10/08/2019    GLUF 102 (H) 10/08/2019    CALCIUM 9 2 10/08/2019    AST 70 (H) 10/08/2019     (H) 10/08/2019    ALKPHOS 83 10/08/2019    EGFR 95 10/08/2019             Vital Sign:    Body surface area is 1 77 meters squared  Wt Readings from Last 3 Encounters:   11/21/19 78 kg (172 lb)   11/18/19 77 8 kg (171 lb 9 6 oz)   11/07/19 78 kg (172 lb)        Temp Readings from Last 3 Encounters:   11/21/19 (!) 95 9 °F (35 5 °C) (Tympanic Core)   11/07/19 98 3 °F (36 8 °C)   10/29/19 (!) 97 °F (36 1 °C)        BP Readings from Last 3 Encounters:   11/21/19 138/98   11/18/19 128/60   11/07/19 (!) 170/108         Pulse Readings from Last 3 Encounters:   11/21/19 102   11/07/19 102   10/29/19 77     @LASTSAO2(3)@    Active Problems:   Patient Active Problem List   Diagnosis    Malignant neoplasm of lower-outer quadrant of left breast of female, estrogen receptor positive ([facility] [address] )    Chemotherapy induced neutropenia ([address] )       Past Medical History:   Past Medical History:   Diagnosis Date    Breast cancer ([address] )     Hyperlipidemia     Hypertension     Vertigo     no medication       Surgical History:   Past Surgical History:   Procedure Laterality Date    BREAST BIOPSY Left 09/17/2019    BREAST LUMPECTOMY Left 10/29/2019    Procedure: BREAST NEEDLE LOCALIZED LUMPECTOMY (NEEDLE LOC AT 0800); Surgeon: Yulia Bailey MD;  [facility];  Service: Surgical Oncology    FRACTURE SURGERY Left     surgery repair    LYMPH NODE BIOPSY Left 10/29/2019    Procedure: SENTINEL LYMPH NODE BIOPSY; LYMPHATIC MAPPING WITH BLUE DYE AND RADIOACTIVE DYE (INJECT AT 0900 BY DR PALACIOS IN THE OR);   Surgeon: Yulia Bailey MD;  [facility];  Service: Surgical Oncology    MAMMO NEEDLE LOCALIZATION LEFT (ALL INC) Left 10/29/2019    MAMMO STEREOTACTIC BREAST BIOPSY RIGHT ([employer]) Right 9/17/2019    US GUIDANCE BREAST BIOPSY LEFT EACH ADDITIONAL Left 9/17/2019    US GUIDED BREAST BIOPSY LEFT COMPLETE Left 2019    WISDOM TOOTH EXTRACTION Bilateral        Family History:    Family History   Problem Relation Age of Onset    Prostate cancer Father 79    Arthritis Father     No Known Problems Sister     No Known Problems Paternal Aunt     Nail disease Paternal Aunt     No Known Problems Paternal Aunt        Cancer-related family history includes Prostate cancer (age of onset: 79) in her father  Social History:   Social History     Socioeconomic History    Marital status: /Civil Union     Spouse name: Not on file    Number of children: Not on file    Years of education: Not on file    Highest education level: Not on file   Occupational History    Not on file   Social Needs    Financial resource strain: Not on file    Food insecurity:     Worry: Not on file     Inability: Not on file    Transportation needs:     Medical: Not on file     Non-medical: Not on file   Tobacco Use    Smoking status: Former Smoker     Last attempt to quit:      Years since quittin 8    Smokeless tobacco: Never Used   Substance and Sexual Activity    Alcohol use:  Yes     Alcohol/week: 7 0 standard drinks     Types: 7 Standard drinks or equivalent per week     Drinks per session: 1 or 2     Comment: daily    Drug use: Never    Sexual activity: Yes     Partners: Female     Birth control/protection: None, Post-menopausal   Lifestyle    Physical activity:     Days per week: Not on file     Minutes per session: Not on file    Stress: Not on file   Relationships    Social connections:     Talks on phone: Not on file     Gets together: Not on file     Attends Christianity service: Not on file     Active member of club or organization: Not on file     Attends meetings of clubs or organizations: Not on file     Relationship status: Not on file    Intimate partner violence:     Fear of current or ex partner: Not on file     Emotionally abused: Not on file     Physically abused: Not on file     Forced sexual activity: Not on file   Other Topics Concern    Not on file   Social History Narrative    Not on file       Current Medications:   Current Outpatient Medications   Medication Sig Dispense Refill    Cholecalciferol (VITAMIN D3) 1000 units CAPS Take 1,000 capsules by mouth daily in the early morning       cyanocobalamin (VITAMIN B-12) 100 mcg tablet Take 100 mcg by mouth daily after breakfast       Multiple Vitamins-Minerals (CENTRUM SILVER 50+WOMEN PO) Take 1 tablet by mouth daily in the early morning       olmesartan (BENICAR) 20 mg tablet Take 40 mg by mouth daily in the early morning   2    oxyCODONE-acetaminophen (PERCOCET) 5-325 mg per tablet Take 1 tablet by mouth every 6 (six) hours as needed for moderate painMax Daily Amount: 4 tablets 6 tablet 0    rosuvastatin (CRESTOR) 5 mg tablet Take 5 mg by mouth once a week   3     No current facility-administered medications for this visit          Allergies: No Known Allergies

## 2019-11-21 NOTE — TELEPHONE ENCOUNTER
Per pt HIPAA form - lm of pap results and that once we have her pelvic u/s results - she will be notified since she already has it scheduled

## 2019-11-21 NOTE — RESULT ENCOUNTER NOTE
Please let Darshana Pabon know the good news that her Pap smear returned normal, HPV negative  Next pap due in 5 years but would still like her to have her transvaginal ultrasound performed and follow up with me soon    Thank you! -AMM

## 2019-11-21 NOTE — TELEPHONE ENCOUNTER
----- Message from Kilo Magaña MD sent at 11/21/2019  2:33 PM EST -----  Please let Ceci Vegas know the good news that her Pap smear returned normal, HPV negative  Next pap due in 5 years but would still like her to have her transvaginal ultrasound performed and follow up with me soon    Thank you! -AMM

## 2019-11-22 ENCOUNTER — HOSPITAL ENCOUNTER (OUTPATIENT)
Dept: ULTRASOUND IMAGING | Facility: MEDICAL CENTER | Age: 64
Discharge: HOME | End: 2019-11-22
Payer: COMMERCIAL

## 2019-11-22 DIAGNOSIS — N88.8 CERVICAL MASS: ICD-10-CM

## 2019-11-22 PROCEDURE — 76830 TRANSVAGINAL US NON-OB: CPT

## 2019-11-22 PROCEDURE — 76856 US EXAM PELVIC COMPLETE: CPT

## 2019-11-25 ENCOUNTER — DOCUMENTATION (OUTPATIENT)
Dept: HEMATOLOGY ONCOLOGY | Facility: CLINIC | Age: 64
End: 2019-11-25

## 2019-11-25 RX ORDER — SODIUM CHLORIDE 9 MG/ML
75 INJECTION, SOLUTION INTRAVENOUS CONTINUOUS
Status: CANCELLED | OUTPATIENT
Start: 2019-12-03

## 2019-11-25 RX ORDER — CEFAZOLIN SODIUM 1 G/50ML
1000 SOLUTION INTRAVENOUS ONCE
Status: CANCELLED | OUTPATIENT
Start: 2019-12-03

## 2019-11-25 NOTE — PROGRESS NOTES
BCN Navigator:  Called patient pre-chemo start to assess for any questions or concerns  We discussed Port, ECHO and chemotherapy  I answered her questions about antiemetics  Patient stated that she has a positive attitude  I offered emotional support and encouraged her to call for and questions moving forward  Patient stated she will be getting her first infusion in [facility]  She was appreciative of my call  I will follow up with her as needed

## 2019-11-26 ENCOUNTER — HOSPITAL ENCOUNTER (OUTPATIENT)
Dept: NON INVASIVE DIAGNOSTICS | Facility: HOSPITAL | Age: 64
Discharge: HOME | End: 2019-11-26
Attending: INTERNAL MEDICINE
Payer: COMMERCIAL

## 2019-11-26 ENCOUNTER — TELEPHONE (OUTPATIENT)
Dept: OBGYN CLINIC | Facility: CLINIC | Age: 64
End: 2019-11-26

## 2019-11-26 ENCOUNTER — TELEPHONE (OUTPATIENT)
Dept: RADIOLOGY | Facility: HOSPITAL | Age: 64
End: 2019-11-26

## 2019-11-26 DIAGNOSIS — D70.1 CHEMOTHERAPY INDUCED NEUTROPENIA (HCC): ICD-10-CM

## 2019-11-26 DIAGNOSIS — T45.1X5A CHEMOTHERAPY INDUCED NEUTROPENIA (HCC): ICD-10-CM

## 2019-11-26 DIAGNOSIS — Z17.0 MALIGNANT NEOPLASM OF LOWER-OUTER QUADRANT OF LEFT BREAST OF FEMALE, ESTROGEN RECEPTOR POSITIVE (HCC): ICD-10-CM

## 2019-11-26 DIAGNOSIS — C50.512 MALIGNANT NEOPLASM OF LOWER-OUTER QUADRANT OF LEFT BREAST OF FEMALE, ESTROGEN RECEPTOR POSITIVE (HCC): ICD-10-CM

## 2019-11-26 PROCEDURE — 93306 TTE W/DOPPLER COMPLETE: CPT | Performed by: INTERNAL MEDICINE

## 2019-11-26 PROCEDURE — 93306 TTE W/DOPPLER COMPLETE: CPT

## 2019-12-02 ENCOUNTER — TELEPHONE (OUTPATIENT)
Dept: SURGERY | Facility: HOSPITAL | Age: 64
End: 2019-12-02

## 2019-12-03 ENCOUNTER — HOSPITAL ENCOUNTER (OUTPATIENT)
Dept: RADIOLOGY | Facility: HOSPITAL | Age: 64
Discharge: HOME | End: 2019-12-03
Attending: INTERNAL MEDICINE | Admitting: RADIOLOGY
Payer: COMMERCIAL

## 2019-12-03 VITALS
WEIGHT: 168 LBS | DIASTOLIC BLOOD PRESSURE: 70 MMHG | RESPIRATION RATE: 18 BRPM | HEART RATE: 92 BPM | SYSTOLIC BLOOD PRESSURE: 156 MMHG | OXYGEN SATURATION: 96 % | BODY MASS INDEX: 31.72 KG/M2 | TEMPERATURE: 97.6 F | HEIGHT: 61 IN

## 2019-12-03 DIAGNOSIS — T45.1X5A CHEMOTHERAPY INDUCED NEUTROPENIA (HCC): ICD-10-CM

## 2019-12-03 DIAGNOSIS — C50.512 MALIGNANT NEOPLASM OF LOWER-OUTER QUADRANT OF LEFT BREAST OF FEMALE, ESTROGEN RECEPTOR POSITIVE (HCC): ICD-10-CM

## 2019-12-03 DIAGNOSIS — Z17.0 MALIGNANT NEOPLASM OF LOWER-OUTER QUADRANT OF LEFT BREAST OF FEMALE, ESTROGEN RECEPTOR POSITIVE (HCC): ICD-10-CM

## 2019-12-03 DIAGNOSIS — D70.1 CHEMOTHERAPY INDUCED NEUTROPENIA (HCC): ICD-10-CM

## 2019-12-03 PROCEDURE — 77001 FLUOROGUIDE FOR VEIN DEVICE: CPT

## 2019-12-03 PROCEDURE — C1788 PORT, INDWELLING, IMP: HCPCS

## 2019-12-03 PROCEDURE — 99153 MOD SED SAME PHYS/QHP EA: CPT

## 2019-12-03 PROCEDURE — 76937 US GUIDE VASCULAR ACCESS: CPT

## 2019-12-03 PROCEDURE — 99152 MOD SED SAME PHYS/QHP 5/>YRS: CPT

## 2019-12-03 PROCEDURE — C1894 INTRO/SHEATH, NON-LASER: HCPCS

## 2019-12-03 PROCEDURE — 36561 INSERT TUNNELED CV CATH: CPT

## 2019-12-03 RX ORDER — FENTANYL CITRATE 50 UG/ML
INJECTION, SOLUTION INTRAMUSCULAR; INTRAVENOUS CODE/TRAUMA/SEDATION MEDICATION
Status: COMPLETED | OUTPATIENT
Start: 2019-12-03 | End: 2019-12-03

## 2019-12-03 RX ORDER — CEFAZOLIN SODIUM 1 G/50ML
1000 SOLUTION INTRAVENOUS ONCE
Status: DISCONTINUED | OUTPATIENT
Start: 2019-12-03 | End: 2019-12-03 | Stop reason: HOSPADM

## 2019-12-03 RX ORDER — CEFAZOLIN SODIUM 2 G/50ML
SOLUTION INTRAVENOUS
Status: COMPLETED | OUTPATIENT
Start: 2019-12-03 | End: 2019-12-03

## 2019-12-03 RX ORDER — SODIUM CHLORIDE 9 MG/ML
75 INJECTION, SOLUTION INTRAVENOUS CONTINUOUS
Status: DISCONTINUED | OUTPATIENT
Start: 2019-12-03 | End: 2019-12-03 | Stop reason: HOSPADM

## 2019-12-03 RX ORDER — DIPHENHYDRAMINE HYDROCHLORIDE 50 MG/ML
INJECTION INTRAMUSCULAR; INTRAVENOUS CODE/TRAUMA/SEDATION MEDICATION
Status: COMPLETED | OUTPATIENT
Start: 2019-12-03 | End: 2019-12-03

## 2019-12-03 RX ORDER — MIDAZOLAM HYDROCHLORIDE 2 MG/2ML
INJECTION, SOLUTION INTRAMUSCULAR; INTRAVENOUS CODE/TRAUMA/SEDATION MEDICATION
Status: COMPLETED | OUTPATIENT
Start: 2019-12-03 | End: 2019-12-03

## 2019-12-03 RX ADMIN — DIPHENHYDRAMINE HYDROCHLORIDE 50 MG: 50 INJECTION, SOLUTION INTRAMUSCULAR; INTRAVENOUS at 12:55

## 2019-12-03 RX ADMIN — MIDAZOLAM 1 MG: 1 INJECTION INTRAMUSCULAR; INTRAVENOUS at 12:46

## 2019-12-03 RX ADMIN — FENTANYL CITRATE 50 MCG: 50 INJECTION, SOLUTION INTRAMUSCULAR; INTRAVENOUS at 12:46

## 2019-12-03 RX ADMIN — SODIUM CHLORIDE 75 ML/HR: 0.9 INJECTION, SOLUTION INTRAVENOUS at 11:24

## 2019-12-03 RX ADMIN — FENTANYL CITRATE 50 MCG: 50 INJECTION, SOLUTION INTRAMUSCULAR; INTRAVENOUS at 13:01

## 2019-12-03 RX ADMIN — CEFAZOLIN SODIUM 2000 MG: 2 SOLUTION INTRAVENOUS at 12:45

## 2019-12-03 RX ADMIN — FENTANYL CITRATE 50 MCG: 50 INJECTION, SOLUTION INTRAMUSCULAR; INTRAVENOUS at 13:09

## 2019-12-03 RX ADMIN — MIDAZOLAM 1 MG: 1 INJECTION INTRAMUSCULAR; INTRAVENOUS at 13:09

## 2019-12-03 RX ADMIN — MIDAZOLAM 1 MG: 1 INJECTION INTRAMUSCULAR; INTRAVENOUS at 13:01

## 2019-12-03 NOTE — DISCHARGE INSTRUCTIONS
Implanted Venous Access Port     WHAT YOU NEED TO KNOW:   An implanted venous access port is a device used to give treatments and take blood  It may also be called a central venous access device (CVAD)  The port is a small container that is placed under your skin, usually in your upper chest  The port is attached to a catheter that enters a large vein  DISCHARGE INSTRUCTIONS:   Resume your normal diet  Small sips of flat soda will help with mild nausea  Prevent an infection:   · Wash your hands often  Use soap and water  Clean your hands before and after you care for your port  Remind everyone who cares for your port to wash their hands  · Check your skin for infection every day  Look for redness, swelling, or fluid oozing from the port site  Care for your port:   1  You may shower beginning 48 hours after procedure  2  Change dressing if it becomes wet  3  Remove dressing after 24 hours  Leave glue in place  4  It is normal for some bruising to occur  5  Use Tylenol for pain  6  Limit use of arm on the side that your port was placed  Lift nothing heavier than 5 pounds for 1 week, and then gradually increase activity as tolerated  7  DO NOT apply ointment, lotion or cream to port site until incision is healed  Allow glue to fall off  DO NOT attempt to peel glue from skin even it it begins to flake  8, After the port incision is healed you may swim, bathe  Notify the Interventional Radiologist if you have any of the followin  Fever above 101 F    2  Increased redness or swelling after 1st day  3  Increased pain after 1st day  4  Any sign of infection (drainage from port site, skin separation, hot to touch)  5  Persistent nausea or vomiting  Contact Interventional Radiology at [phone] [facility] PATIENTS: Contact Interventional Radiology at [phone]) ([address] [facility] [address]: Contact Interventional Radiology at [phone])

## 2019-12-03 NOTE — BRIEF OP NOTE (RAD/CATH)
IR PORT PLACEMENT Procedure Note    PATIENT NAME: Marlon Gillette  : 1955  MRN: [id number]    Pre-op Diagnosis:   1  Malignant neoplasm of lower-outer quadrant of left breast of female, estrogen receptor positive ([facility] [address] )    2  Chemotherapy induced neutropenia (HCC)      Post-op Diagnosis:   1  Malignant neoplasm of lower-outer quadrant of left breast of female, estrogen receptor positive ([facility] [address] )    2  Chemotherapy induced neutropenia [facility])        Surgeon:   Lenore Dorsey MD  Assistants:     No qualified resident was available, Resident is only observing     Estimated Blood Loss: None  Findings: Successful placement of 8F x 22cm right IJV MedComp Dignity port with tip at cavoatrial junction      Specimens: None    Complications:  None     Anesthesia: Conscious sedation and Local    Lenore Dorsey MD     Date: 12/3/2019  Time: 1:59 PM

## 2019-12-03 NOTE — PROGRESS NOTES
Per IR, patient does not need to have lab work drawn today, as she is going to have lab work drawn tomorrow  IR also made aware of patient's HBP  Pt does admit to feeling anxious this morning  Otherwise ready  [No Acute Distress] : no acute distress [Alert] : alert [Normocephalic] : normocephalic [EOMI] : EOMI [Clear TM bilaterally] : clear tympanic membranes bilaterally [Inflamed Nasal Mucosa] : inflamed nasal mucosa [Erythematous Oropharynx] : erythematous oropharynx [Supple] : supple [Clear to Ausculatation Bilaterally] : clear to auscultation bilaterally [Regular Rate and Rhythm] : regular rate and rhythm [Soft] : soft [NonTender] : non tender [No Abnormal Lymph Nodes Palpated] : no abnormal lymph nodes palpated [Moves All Extremities x 4] : moves all extremities x4 [Normotonic] : normotonic [Warm] : warm [FreeTextEntry4] : Nasal congestion- thicker

## 2019-12-03 NOTE — INTERVAL H&P NOTE
Update: (This section must be completed if the H&P was completed greater than 24 hrs to procedure or admission)    H&P reviewed  After examining the patient, I find no changed to the H&P since it had been written  Patient re-evaluated   Accept as history and physical     Jaden Chicas MD/December 3, 2019/1:57 PM

## 2019-12-04 ENCOUNTER — APPOINTMENT (OUTPATIENT)
Dept: LAB | Facility: CLINIC | Age: 64
End: 2019-12-04
Payer: COMMERCIAL

## 2019-12-04 DIAGNOSIS — C50.512 MALIGNANT NEOPLASM OF LOWER-OUTER QUADRANT OF LEFT BREAST OF FEMALE, ESTROGEN RECEPTOR POSITIVE (HCC): ICD-10-CM

## 2019-12-04 DIAGNOSIS — T45.1X5A CHEMOTHERAPY INDUCED NEUTROPENIA (HCC): ICD-10-CM

## 2019-12-04 DIAGNOSIS — Z17.0 MALIGNANT NEOPLASM OF LOWER-OUTER QUADRANT OF LEFT BREAST OF FEMALE, ESTROGEN RECEPTOR POSITIVE (HCC): ICD-10-CM

## 2019-12-04 DIAGNOSIS — D70.1 CHEMOTHERAPY INDUCED NEUTROPENIA (HCC): ICD-10-CM

## 2019-12-04 LAB
ALBUMIN SERPL BCP-MCNC: 3.7 G/DL (ref 3.5–5)
ALP SERPL-CCNC: 86 U/L (ref 46–116)
ALT SERPL W P-5'-P-CCNC: 69 U/L (ref 12–78)
ANION GAP SERPL CALCULATED.3IONS-SCNC: 5 MMOL/L (ref 4–13)
AST SERPL W P-5'-P-CCNC: 51 U/L (ref 5–45)
BASOPHILS # BLD AUTO: 0.06 THOUSANDS/ΜL (ref 0–0.1)
BASOPHILS NFR BLD AUTO: 1 % (ref 0–1)
BILIRUB SERPL-MCNC: 0.66 MG/DL (ref 0.2–1)
BUN SERPL-MCNC: 14 MG/DL (ref 5–25)
CALCIUM SERPL-MCNC: 9.7 MG/DL (ref 8.3–10.1)
CHLORIDE SERPL-SCNC: 104 MMOL/L (ref 100–108)
CO2 SERPL-SCNC: 26 MMOL/L (ref 21–32)
CREAT SERPL-MCNC: 0.63 MG/DL (ref 0.6–1.3)
EOSINOPHIL # BLD AUTO: 0.1 THOUSAND/ΜL (ref 0–0.61)
EOSINOPHIL NFR BLD AUTO: 1 % (ref 0–6)
ERYTHROCYTE [DISTWIDTH] IN BLOOD BY AUTOMATED COUNT: 13.3 % (ref 11.6–15.1)
GFR SERPL CREATININE-BSD FRML MDRD: 96 ML/MIN/1.73SQ M
GLUCOSE SERPL-MCNC: 102 MG/DL (ref 65–140)
HCT VFR BLD AUTO: 42.4 % (ref 34.8–46.1)
HGB BLD-MCNC: 13.3 G/DL (ref 11.5–15.4)
IMM GRANULOCYTES # BLD AUTO: 0.02 THOUSAND/UL (ref 0–0.2)
IMM GRANULOCYTES NFR BLD AUTO: 0 % (ref 0–2)
LYMPHOCYTES # BLD AUTO: 1.61 THOUSANDS/ΜL (ref 0.6–4.47)
LYMPHOCYTES NFR BLD AUTO: 22 % (ref 14–44)
MCH RBC QN AUTO: 31 PG (ref 26.8–34.3)
MCHC RBC AUTO-ENTMCNC: 31.4 G/DL (ref 31.4–37.4)
MCV RBC AUTO: 99 FL (ref 82–98)
MONOCYTES # BLD AUTO: 0.7 THOUSAND/ΜL (ref 0.17–1.22)
MONOCYTES NFR BLD AUTO: 10 % (ref 4–12)
NEUTROPHILS # BLD AUTO: 4.83 THOUSANDS/ΜL (ref 1.85–7.62)
NEUTS SEG NFR BLD AUTO: 66 % (ref 43–75)
NRBC BLD AUTO-RTO: 0 /100 WBCS
PLATELET # BLD AUTO: 229 THOUSANDS/UL (ref 149–390)
PMV BLD AUTO: 10.3 FL (ref 8.9–12.7)
POTASSIUM SERPL-SCNC: 3.6 MMOL/L (ref 3.5–5.3)
PROT SERPL-MCNC: 7.6 G/DL (ref 6.4–8.2)
RBC # BLD AUTO: 4.29 MILLION/UL (ref 3.81–5.12)
SODIUM SERPL-SCNC: 135 MMOL/L (ref 136–145)
WBC # BLD AUTO: 7.32 THOUSAND/UL (ref 4.31–10.16)

## 2019-12-04 PROCEDURE — 36415 COLL VENOUS BLD VENIPUNCTURE: CPT

## 2019-12-04 PROCEDURE — 85025 COMPLETE CBC W/AUTO DIFF WBC: CPT

## 2019-12-04 PROCEDURE — 80053 COMPREHEN METABOLIC PANEL: CPT

## 2019-12-04 RX ORDER — SODIUM CHLORIDE 9 MG/ML
20 INJECTION, SOLUTION INTRAVENOUS ONCE
Status: CANCELLED | OUTPATIENT
Start: 2019-12-05

## 2019-12-06 ENCOUNTER — DOCUMENTATION (OUTPATIENT)
Dept: HEMATOLOGY ONCOLOGY | Facility: CLINIC | Age: 64
End: 2019-12-06

## 2019-12-06 ENCOUNTER — HOSPITAL ENCOUNTER (OUTPATIENT)
Dept: INFUSION CENTER | Facility: CLINIC | Age: 64
Discharge: HOME | End: 2019-12-06
Payer: COMMERCIAL

## 2019-12-06 VITALS
BODY MASS INDEX: 32.34 KG/M2 | DIASTOLIC BLOOD PRESSURE: 97 MMHG | TEMPERATURE: 98.5 F | HEART RATE: 86 BPM | WEIGHT: 171.3 LBS | HEIGHT: 61 IN | SYSTOLIC BLOOD PRESSURE: 166 MMHG | RESPIRATION RATE: 18 BRPM

## 2019-12-06 DIAGNOSIS — T45.1X5A CHEMOTHERAPY INDUCED NEUTROPENIA (HCC): Primary | ICD-10-CM

## 2019-12-06 DIAGNOSIS — Z95.828 PORT-A-CATH IN PLACE: Primary | ICD-10-CM

## 2019-12-06 DIAGNOSIS — C50.512 MALIGNANT NEOPLASM OF LOWER-OUTER QUADRANT OF LEFT BREAST OF FEMALE, ESTROGEN RECEPTOR POSITIVE (HCC): ICD-10-CM

## 2019-12-06 DIAGNOSIS — D70.1 CHEMOTHERAPY INDUCED NEUTROPENIA (HCC): Primary | ICD-10-CM

## 2019-12-06 DIAGNOSIS — Z17.0 MALIGNANT NEOPLASM OF LOWER-OUTER QUADRANT OF LEFT BREAST OF FEMALE, ESTROGEN RECEPTOR POSITIVE (HCC): ICD-10-CM

## 2019-12-06 PROCEDURE — 96367 TX/PROPH/DG ADDL SEQ IV INF: CPT

## 2019-12-06 PROCEDURE — 96377 APPLICATON ON-BODY INJECTOR: CPT

## 2019-12-06 PROCEDURE — 96417 CHEMO IV INFUS EACH ADDL SEQ: CPT

## 2019-12-06 PROCEDURE — 96413 CHEMO IV INFUSION 1 HR: CPT

## 2019-12-06 RX ORDER — LIDOCAINE AND PRILOCAINE 25; 25 MG/G; MG/G
CREAM TOPICAL AS NEEDED
Qty: 30 G | Refills: 1 | Status: SHIPPED | OUTPATIENT
Start: 2019-12-06 | End: 2020-11-18 | Stop reason: SDUPTHER

## 2019-12-06 RX ORDER — SODIUM CHLORIDE 9 MG/ML
20 INJECTION, SOLUTION INTRAVENOUS ONCE
Status: COMPLETED | OUTPATIENT
Start: 2019-12-06 | End: 2019-12-06

## 2019-12-06 RX ADMIN — CARBOPLATIN 616.8 MG: 10 INJECTION, SOLUTION INTRAVENOUS at 13:02

## 2019-12-06 RX ADMIN — DEXAMETHASONE SODIUM PHOSPHATE: 10 INJECTION, SOLUTION INTRAMUSCULAR; INTRAVENOUS at 10:31

## 2019-12-06 RX ADMIN — SODIUM CHLORIDE 20 ML/HR: 0.9 INJECTION, SOLUTION INTRAVENOUS at 10:34

## 2019-12-06 RX ADMIN — PEGFILGRASTIM 6 MG: KIT SUBCUTANEOUS at 15:33

## 2019-12-06 RX ADMIN — DOCETAXEL 106.2 MG: 20 INJECTION, SOLUTION, CONCENTRATE INTRAVENOUS at 11:55

## 2019-12-06 RX ADMIN — SODIUM CHLORIDE 150 MG: 0.9 INJECTION, SOLUTION INTRAVENOUS at 11:09

## 2019-12-06 RX ADMIN — TRASTUZUMAB 600 MG: 150 INJECTION, POWDER, LYOPHILIZED, FOR SOLUTION INTRAVENOUS at 14:01

## 2019-12-06 NOTE — PROGRESS NOTES
Patient very anxious before chemotherapy  Patient was tearful at the start of treatment  She also reported that the port accessing was very painful  Abhi Jesus was contacted to request emla cream  Rasheeda Paz from Case management did reach out to the patient via telephone  Patient tolerating her taxotere and carboplatin well without adverse reaction

## 2019-12-06 NOTE — PROGRESS NOTES
BCN Navigator:   Met with patient at [facility] for Cycle 1 T/C  Discussed possible side effects of chemotherapy, provided her with "Chemotherapy and You" booklet, wig resources, patient education material and other support resources  Supportive care provided and all questions answered to her satisfaction  Patient has my contact information and was encouraged to call for any concerns  Will follow up as needed

## 2019-12-06 NOTE — PROGRESS NOTES
Patient completed infusion without incident  Neulasta onpro applied to RA  Patient aware of when neulasta is to infuse tomorrow  Denies any questions in regards to onpro  Encouraged to notify physician's office if any issues/concerns  Aware of next appointment   AVS given to patient

## 2019-12-10 ENCOUNTER — TELEPHONE (OUTPATIENT)
Dept: INFUSION CENTER | Facility: CLINIC | Age: 64
End: 2019-12-10

## 2019-12-10 NOTE — TELEPHONE ENCOUNTER
Follow up phone call was made to [facility]  She reported fatigue that improves with sleep  She also reported that she received a letter in the mail stating that her insurance did not cover her dose of herceptin  Britt Ruiz in Dr Elina Barraza office was notified  An e-mail was sent to finance to check on this

## 2019-12-12 ENCOUNTER — TELEPHONE (OUTPATIENT)
Dept: HEMATOLOGY ONCOLOGY | Facility: CLINIC | Age: 64
End: 2019-12-12

## 2019-12-20 ENCOUNTER — OFFICE VISIT (OUTPATIENT)
Dept: HEMATOLOGY ONCOLOGY | Facility: CLINIC | Age: 64
End: 2019-12-20
Payer: COMMERCIAL

## 2019-12-20 VITALS
TEMPERATURE: 96.3 F | WEIGHT: 173 LBS | RESPIRATION RATE: 18 BRPM | HEIGHT: 61 IN | HEART RATE: 105 BPM | SYSTOLIC BLOOD PRESSURE: 138 MMHG | BODY MASS INDEX: 32.66 KG/M2 | OXYGEN SATURATION: 98 % | DIASTOLIC BLOOD PRESSURE: 84 MMHG

## 2019-12-20 DIAGNOSIS — C50.512 MALIGNANT NEOPLASM OF LOWER-OUTER QUADRANT OF LEFT BREAST OF FEMALE, ESTROGEN RECEPTOR POSITIVE (HCC): Primary | ICD-10-CM

## 2019-12-20 DIAGNOSIS — Z17.0 MALIGNANT NEOPLASM OF LOWER-OUTER QUADRANT OF LEFT BREAST OF FEMALE, ESTROGEN RECEPTOR POSITIVE (HCC): Primary | ICD-10-CM

## 2019-12-20 DIAGNOSIS — D70.1 CHEMOTHERAPY INDUCED NEUTROPENIA (HCC): ICD-10-CM

## 2019-12-20 DIAGNOSIS — T45.1X5A CHEMOTHERAPY INDUCED NEUTROPENIA (HCC): ICD-10-CM

## 2019-12-20 PROBLEM — Z95.828 PORT-A-CATH IN PLACE: Status: ACTIVE | Noted: 2019-12-20

## 2019-12-20 PROCEDURE — 99214 OFFICE O/P EST MOD 30 MIN: CPT | Performed by: INTERNAL MEDICINE

## 2019-12-20 NOTE — PROGRESS NOTES
Hematology / Oncology Outpatient Follow Up Note    Aster Snyder 62 y o  female :1955 [facility]:[id number]         Date:  2019    Assessment / Plan:  A 70-year-old postmenopausal woman with stage IIA left breast cancer, grade 3, ER 70% positive, CO negative, HER2 3+ disease  She underwent lumpectomy and sentinel lymph node biopsy, resulting in GREG  She is negative for BRCA gene mutation  She started adjuvant chemotherapy with TCH with slight dose attenuation of Taxotere due to the LFT abnormality  She tolerated 1st cycle very well  Clinically, she has no evidence recurrent disease  She is due for 2nd cycle of NEGRO SOUTHEAST in 2019  Her LFT was normalized  Therefore, she will have standard dose of TCH  I will see her again prior to the 3rd cycle of NEGRO SOUT[facility]  She is in agreement with my recommendations              Subjective:      HPI:  A 70-year-old postmenopausal woman who noticed a lump in her left breast which she brought to medical attention  She underwent left breast biopsy in 2019 which showed invasive ductal carcinoma, grade 1  This was ER 70% positive, CO negative, HER2 3+ disease  She underwent genetic testing which was negative for BRCA gene mutation  She underwent lumpectomy and sentinel lymph node biopsy by Dr Neville Necessary  Prior to this, she had CT scan of chest abdomen pelvis which showed no obvious distant metastasis  However, she has multiple indeterminate liver lesions  Her LFT is mildly elevated  She has no history of viral hepatitis  However, she drinks 2 alcohol every day for nearly 40 years  Intermittently, she drinks more  Lumpectomy showed 2 2 cm of invasive ductal carcinoma, grade 3  There was no evidence of lymphovascular invasion  1 sentinel lymph node was negative for metastatic disease  She presents today to discuss adjuvant treatment options  She has no complaint of pain  Her weight is stable  She has no respiratory symptoms    She has no family history of breast or ovarian cancer  She quit smoking nearly 10 years ago  Her performance status is normal            Interval History:  A 62year-old postmenopausal woman with stage IIA left breast cancer, grade 3, ER 70% positive, CO negative, HER2 3+ disease  She underwent lumpectomy and sentinel lymph node biopsy, resulting in GREG  She is negative for BRCA gene mutation  She started adjuvant chemotherapy with TCH, 2 weeks ago  She tolerated treatment generally well  She did not have any nausea or vomiting  She did not have Neulasta related bone pain  However, she had 5-6 days of severe fatigue from which she recovered well  She presents today for routine follow-up  She feels well  She is afebrile  She has no respiratory symptoms  Her weight is stable  She has no skin rash  Her performance status is normal            Objective:      Primary Diagnosis:     1  Left breast cancer, stage IIA (pT2, pN0, M0) grade 3, ER 70% positive, CO negative, HER2 3+ disease  Diagnosed in October 2019       2  BRCA gene mutation negative      Cancer Staging:  Cancer Staging  Malignant neoplasm of lower-outer quadrant of left breast of female, estrogen receptor positive ([facility] [address] )  Staging form: Breast, AJCC 8th Edition  - Pathologic: Stage IIA (pT2, pN0(sn), cM0, G3, ER+, CO-, HER2+) - Unsigned  Neoadjuvant therapy: No  Laterality: Left  Tumor size (mm): 22  Method of lymph node assessment: [place] lymph node biopsy  Histologic grading system: 3 grade system           Previous Hematologic/ Oncologic Treatment:            Current Hematologic/ Oncologic Treatment:       Adjuvant chemotherapy with TCH x6 cycle  2nd cycle to be given in December 27, 2019       Disease Status:      GREG is status post lumpectomy and sentinel lymph node biopsy      Test Results:     Pathology:     2 2 cm of invasive ductal carcinoma, grade 3  No evidence of lymphovascular invasion    1 sentinel lymph node was negative for metastatic disease  ER 70% positive, [place] negative, HER2 3+ disease  Stage IIA (pT2, pN0, M0)     Radiology:     CT scan of chest abdomen pelvis showed multiple indeterminate liver lesion, likely to be cyst   MRI of the liver was recommended      Laboratory:     See below      Physical Exam:        General Appearance:    Alert, oriented          Eyes:    PERRL   Ears:    Normal external ear canals, both ears   Nose:   Nares normal, septum midline   Throat:   Mucosa moist  Pharynx without injection  Neck:   Supple         Lungs:     Clear to auscultation bilaterally   Chest Wall:    No tenderness or deformity    Heart:    Regular rate and rhythm         Abdomen:     Soft, non-tender, bowel sounds +, liver is palpable 3 cm below right costal margin  Spleen is not palpable                Extremities:   Extremities no cyanosis or edema         Skin:   no rash or icterus  Lymph nodes:   Cervical, supraclavicular, and axillary nodes normal   Neurologic:   CNII-XII intact, normal strength, sensation and reflexes     Throughout             Breast exam:   Lumpectomy scar at 6 o'clock position in her left breast with no palpable abnormalities  Right breast exam is negative  ROS: Review of Systems   All other systems reviewed and are negative  Imaging: Us Pelvis Complete W Transvaginal    Result Date: 11/26/2019  Narrative: PELVIC ULTRASOUND, COMPLETE INDICATION:  62years old  N88 8: Other specified noninflammatory disorders of cervix uteri  COMPARISON: None TECHNIQUE:   Transabdominal pelvic ultrasound was performed in sagittal and transverse planes with a curvilinear transducer  Additional transvaginal imaging was performed to better evaluate the endometrium and ovaries  Imaging included volumetric sweeps as well as traditional still imaging technique  FINDINGS: UTERUS: The uterus is anteverted in position, measuring 6 x 3 x 4 cm  Heterogeneous uterine echotexture   The cervix shows complex 1 4 x 1 4 x 0 6 cm structure in addition to complex 0 8 cm fluid  Direct cervical visualization recommended to exclude neoplasm/mass as suspected  ENDOMETRIUM:  Normal caliber of 3 mm  OVARIES/ADNEXA: Right ovary was not visualized due to bowel gas artifact  Left ovary was not visualized due to bowel gas artifact  No suspicious adnexal mass or loculated collections  There is no free fluid  Impression:  The cervix shows complex 1 4 x 1 4 x 0 6 cm structure in addition to complex endocervical 0 8 cm fluid pocket  Direct cervical visualization recommended to exclude neoplasm/mass as suspected, gynecologic consult  Workstation performed: [id number]     Ir Port Placement    Result Date: 12/3/2019  Narrative: IR PORT PLACEMENT PROCEDURE: Placement of implantable chest wall port CLINICAL INDICATION: Breast cancer (left side) COMPARISON: None PERFORMING PHYSICIAN: Edilson Frankel MD ASSISTANT PHYSICIAN: None MEDICATIONS: 1% lidocaine (local)  Versed, fentanyl ;  Benadryl SEDATION TIME: Continuous cardiopulmonary monitoring by the interventional radiology physician and/or nurse for  90 minutes CONTRAST: None FLUOROSCOPY TIME: 1 9 MINUTES RADIATION DOSE: 397 mGy   (air Kerma)  NUMBER OF IMAGES: 2 TECHNIQUE: Informed consent was obtained from the patient after a thorough discussion of risks, benefits, and alternatives to the procedure   All questions were answered  The patient was brought to the procedure room and a time-out was performed utilizing universal protocol  The patient was identified verbally and via wrist band  Patient was placed supine on the procedure table in the right neck and chest wall were prepped and draped in usual sterile fashion utilizing all elements of maximal sterile barrier technique including cap, mask, sterile gown, sterile gloves, large sterile sheet, hand hygiene, 2% chlorhexidine for cutaneous antisepsis    Sterile probe cover and sterile gel were used during ultrasound-guided venous access to the right internal jugular vein after infiltrating the skin with lidocaine/epinephrine  Access to the internal jugular vein was achieved with a micropuncture system  A 3 J-wire was advanced into the right atrium  In the infraclavicular chest wall in the 2nd anterior interspace, skin was infiltrated with lidocaine/epinephrine and a 2 cm long incision was made  A subcutaneous pocket was fashioned using blunt dissection  4-0 Prolene stay sutures were placed in the  floor of the pocket  A blunt tunneling device was used to tunnel 8-Sammarinese catheter from the pocket to the venotomy site  An 8-Sammarinese peel-away sheath was placed over the wire and the venotomy site and the end of the catheter was placed through the peel-away sheath which was then removed  Catheter length was adjusted so the tip was at the cavoatrial junction  The catheter was cut to the appropriate length of 22 cm and fashion to the nipple of the [facility] port reservoir secured with a locking collar  The reservoir was secured to the floor the pocket with 4 0-0 Prolene  The pocket was flushed and closed with 3-0 Vicryl deep interrupted sutures followed by 4-0 Vicryl running subcuticular suture with additional closure using skin adhesive of the port pocket and venotomy site  Port had excellent blood return and was easily flushed    The patient tolerated the procedure well without difficulty or complication encountered and left the section in satisfactory stable condition  FINDINGS: As above  Impression: Technically successful placement of chest wall port as described   Workstation performed: [id number]         Labs:   Lab Results   Component Value Date    WBC 7 32 12/04/2019    HGB 13 3 12/04/2019    HCT 42 4 12/04/2019    MCV 99 (H) 12/04/2019     12/04/2019     Lab Results   Component Value Date    K 3 6 12/04/2019     12/04/2019    CO2 26 12/04/2019    BUN 14 12/04/2019    CREATININE 0 63 12/04/2019    GLUF 102 (H) 10/08/2019 CALCIUM 9 7 12/04/2019    AST 51 (H) 12/04/2019    ALT 69 12/04/2019    ALKPHOS 86 12/04/2019    EGFR 96 12/04/2019         Current Medications: Reviewed  Allergies: Reviewed  PMH/FH/SH:  Reviewed      Vital Sign:    Body surface area is 1 77 meters squared      Wt Readings from Last 3 Encounters:   12/20/19 78 5 kg (173 lb)   12/06/19 77 7 kg (171 lb 4 8 oz)   12/03/19 76 2 kg (168 lb)        Temp Readings from Last 3 Encounters:   12/20/19 (!) 96 3 °F (35 7 °C) (Tympanic Core)   12/06/19 98 5 °F (36 9 °C) (Temporal)   12/03/19 97 6 °F (36 4 °C) (Oral)        BP Readings from Last 3 Encounters:   12/20/19 138/84   12/06/19 166/97   12/03/19 156/70         Pulse Readings from Last 3 Encounters:   12/20/19 105   12/06/19 86   12/03/19 92     @LASTSAO2(3)@

## 2019-12-26 ENCOUNTER — HOSPITAL ENCOUNTER (OUTPATIENT)
Dept: INFUSION CENTER | Facility: CLINIC | Age: 64
Discharge: HOME | End: 2019-12-26
Payer: COMMERCIAL

## 2019-12-26 DIAGNOSIS — Z95.828 PORT-A-CATH IN PLACE: ICD-10-CM

## 2019-12-26 DIAGNOSIS — Z17.0 MALIGNANT NEOPLASM OF LOWER-OUTER QUADRANT OF LEFT BREAST OF FEMALE, ESTROGEN RECEPTOR POSITIVE (HCC): ICD-10-CM

## 2019-12-26 DIAGNOSIS — T45.1X5A CHEMOTHERAPY INDUCED NEUTROPENIA (HCC): Primary | ICD-10-CM

## 2019-12-26 DIAGNOSIS — C50.512 MALIGNANT NEOPLASM OF LOWER-OUTER QUADRANT OF LEFT BREAST OF FEMALE, ESTROGEN RECEPTOR POSITIVE (HCC): ICD-10-CM

## 2019-12-26 DIAGNOSIS — D70.1 CHEMOTHERAPY INDUCED NEUTROPENIA (HCC): Primary | ICD-10-CM

## 2019-12-26 LAB
ALBUMIN SERPL BCP-MCNC: 3.5 G/DL (ref 3.5–5.7)
ALP SERPL-CCNC: 89 U/L (ref 46–116)
ALT SERPL W P-5'-P-CCNC: 57 U/L (ref 12–78)
ANION GAP SERPL CALCULATED.3IONS-SCNC: 7 MMOL/L (ref 4–13)
AST SERPL W P-5'-P-CCNC: 37 U/L (ref 5–45)
BASOPHILS # BLD AUTO: 0.04 THOUSANDS/ΜL (ref 0–0.1)
BASOPHILS NFR BLD AUTO: 1 % (ref 0–1)
BILIRUB SERPL-MCNC: 0.8 MG/DL (ref 0.2–1)
BUN SERPL-MCNC: 11 MG/DL (ref 5–25)
CALCIUM SERPL-MCNC: 8.9 MG/DL (ref 8.3–10.1)
CHLORIDE SERPL-SCNC: 106 MMOL/L (ref 98–108)
CO2 SERPL-SCNC: 26 MMOL/L (ref 21–32)
CREAT SERPL-MCNC: 0.5 MG/DL (ref 0.6–1.3)
EOSINOPHIL # BLD AUTO: 0.01 THOUSAND/ΜL (ref 0–0.61)
EOSINOPHIL NFR BLD AUTO: 0 % (ref 0–6)
ERYTHROCYTE [DISTWIDTH] IN BLOOD BY AUTOMATED COUNT: 13.8 % (ref 11.6–15.1)
GFR SERPL CREATININE-BSD FRML MDRD: 103 ML/MIN/1.73SQ M
GLUCOSE SERPL-MCNC: 110 MG/DL (ref 65–140)
HCT VFR BLD AUTO: 37.6 % (ref 34.8–46.1)
HGB BLD-MCNC: 12.1 G/DL (ref 11.5–15.4)
LYMPHOCYTES # BLD AUTO: 1.26 THOUSANDS/ΜL (ref 0.6–4.47)
LYMPHOCYTES NFR BLD AUTO: 20 % (ref 14–44)
MCH RBC QN AUTO: 31 PG (ref 26.8–34.3)
MCHC RBC AUTO-ENTMCNC: 32.2 G/DL (ref 31.4–37.4)
MCV RBC AUTO: 96 FL (ref 82–98)
MONOCYTES # BLD AUTO: 0.67 THOUSAND/ΜL (ref 0.17–1.22)
MONOCYTES NFR BLD AUTO: 11 % (ref 4–12)
NEUTROPHILS # BLD AUTO: 4.19 THOUSANDS/ΜL (ref 1.85–7.62)
NEUTS SEG NFR BLD AUTO: 68 % (ref 43–75)
PLATELET # BLD AUTO: 237 THOUSANDS/UL (ref 149–390)
PMV BLD AUTO: 9.5 FL (ref 8.9–12.7)
POTASSIUM SERPL-SCNC: 3.5 MMOL/L (ref 3.5–5.3)
PROT SERPL-MCNC: 6.8 G/DL (ref 6.4–8.2)
RBC # BLD AUTO: 3.9 MILLION/UL (ref 3.81–5.12)
SODIUM SERPL-SCNC: 139 MMOL/L (ref 136–145)
WBC # BLD AUTO: 6.17 THOUSAND/UL (ref 4.31–10.16)

## 2019-12-26 PROCEDURE — 85025 COMPLETE CBC W/AUTO DIFF WBC: CPT | Performed by: INTERNAL MEDICINE

## 2019-12-26 PROCEDURE — 80053 COMPREHEN METABOLIC PANEL: CPT | Performed by: INTERNAL MEDICINE

## 2019-12-26 RX ORDER — SODIUM CHLORIDE 9 MG/ML
20 INJECTION, SOLUTION INTRAVENOUS ONCE
Status: CANCELLED | OUTPATIENT
Start: 2019-12-26

## 2019-12-26 NOTE — PROGRESS NOTES
Pt  Denies new symptoms or concerns today  Labs obtained as ordered via port a cath  Excellent blood return noted  Future appointments reviewed  Pt  Will return to infusion tomorrow for chemotherapy  AVS declined

## 2019-12-26 NOTE — PLAN OF CARE
Problem: PAIN - ADULT  Goal: Verbalizes/displays adequate comfort level or baseline comfort level  Description  Interventions:  - Encourage patient to monitor pain and request assistance  - Assess pain using appropriate pain scale  - Administer analgesics based on type and severity of pain and evaluate response  - Implement non-pharmacological measures as appropriate and evaluate response  - Consider cultural and social influences on pain and pain management  - Notify physician/advanced practitioner if interventions unsuccessful or patient reports new pain  Outcome: Progressing     Problem: INFECTION - ADULT  Goal: Absence or prevention of progression during hospitalization  Description  INTERVENTIONS:  - Assess and monitor for signs and symptoms of infection  - Monitor lab/diagnostic results  - Monitor all insertion sites, i e  indwelling lines, tubes, and drains  - Monitor endotracheal if appropriate and nasal secretions for changes in amount and color  - [place] appropriate cooling/warming therapies per order  - Administer medications as ordered  - Instruct and encourage patient and family to use good hand hygiene technique  - Identify and instruct in appropriate isolation precautions for identified infection/condition  Outcome: Progressing  Goal: Absence of fever/infection during neutropenic period  Description  INTERVENTIONS:  - Monitor WBC    Outcome: Progressing     Problem: Knowledge Deficit  Goal: Patient/family/caregiver demonstrates understanding of disease process, treatment plan, medications, and discharge instructions  Description  Complete learning assessment and assess knowledge base    Interventions:  - Provide teaching at level of understanding  - Provide teaching via preferred learning methods  Outcome: Progressing

## 2019-12-27 ENCOUNTER — HOSPITAL ENCOUNTER (OUTPATIENT)
Dept: INFUSION CENTER | Facility: CLINIC | Age: 64
Discharge: HOME | End: 2019-12-27
Payer: COMMERCIAL

## 2019-12-27 VITALS
WEIGHT: 171.96 LBS | HEART RATE: 94 BPM | BODY MASS INDEX: 32.47 KG/M2 | SYSTOLIC BLOOD PRESSURE: 167 MMHG | TEMPERATURE: 97.3 F | DIASTOLIC BLOOD PRESSURE: 97 MMHG | RESPIRATION RATE: 18 BRPM | HEIGHT: 61 IN

## 2019-12-27 DIAGNOSIS — D70.1 CHEMOTHERAPY INDUCED NEUTROPENIA (HCC): Primary | ICD-10-CM

## 2019-12-27 DIAGNOSIS — Z17.0 MALIGNANT NEOPLASM OF LOWER-OUTER QUADRANT OF LEFT BREAST OF FEMALE, ESTROGEN RECEPTOR POSITIVE (HCC): ICD-10-CM

## 2019-12-27 DIAGNOSIS — T45.1X5A CHEMOTHERAPY INDUCED NEUTROPENIA (HCC): Primary | ICD-10-CM

## 2019-12-27 DIAGNOSIS — C50.512 MALIGNANT NEOPLASM OF LOWER-OUTER QUADRANT OF LEFT BREAST OF FEMALE, ESTROGEN RECEPTOR POSITIVE (HCC): ICD-10-CM

## 2019-12-27 PROCEDURE — 96413 CHEMO IV INFUSION 1 HR: CPT

## 2019-12-27 PROCEDURE — 96377 APPLICATON ON-BODY INJECTOR: CPT

## 2019-12-27 PROCEDURE — 96417 CHEMO IV INFUS EACH ADDL SEQ: CPT

## 2019-12-27 PROCEDURE — 96367 TX/PROPH/DG ADDL SEQ IV INF: CPT

## 2019-12-27 RX ORDER — SODIUM CHLORIDE 9 MG/ML
20 INJECTION, SOLUTION INTRAVENOUS ONCE
Status: COMPLETED | OUTPATIENT
Start: 2019-12-27 | End: 2019-12-27

## 2019-12-27 RX ADMIN — DEXAMETHASONE SODIUM PHOSPHATE: 10 INJECTION, SOLUTION INTRAMUSCULAR; INTRAVENOUS at 10:07

## 2019-12-27 RX ADMIN — CARBOPLATIN 616.8 MG: 10 INJECTION, SOLUTION INTRAVENOUS at 12:19

## 2019-12-27 RX ADMIN — DOCETAXEL 132.8 MG: 20 INJECTION, SOLUTION, CONCENTRATE INTRAVENOUS at 11:15

## 2019-12-27 RX ADMIN — SODIUM CHLORIDE 150 MG: 0.9 INJECTION, SOLUTION INTRAVENOUS at 10:32

## 2019-12-27 RX ADMIN — PEGFILGRASTIM 6 MG: KIT SUBCUTANEOUS at 13:29

## 2019-12-27 RX ADMIN — SODIUM CHLORIDE 20 ML/HR: 0.9 INJECTION, SOLUTION INTRAVENOUS at 10:07

## 2019-12-27 RX ADMIN — TRASTUZUMAB 450 MG: 150 INJECTION, POWDER, LYOPHILIZED, FOR SOLUTION INTRAVENOUS at 13:25

## 2019-12-27 NOTE — PROGRESS NOTES
Pt arrived to unit and denies any signs/symptoms of infection  Pt tolerated taxotere, carboplatin & herceptin infusions well without adverse reaction  Pt given AVS and is aware of her next appointment

## 2019-12-27 NOTE — PLAN OF CARE
Problem: Potential for Falls  Goal: Patient will remain free of falls  Description  INTERVENTIONS:  - Assess patient frequently for physical needs  -  Identify cognitive and physical deficits and behaviors that affect risk of falls    -  [place] fall precautions as indicated by assessment   - Educate patient/family on patient safety including physical limitations  - Instruct patient to call for assistance with activity based on assessment  - Modify environment to reduce risk of injury  - Consider OT/PT consult to assist with strengthening/mobility  Outcome: Progressing

## 2019-12-30 ENCOUNTER — HOSPITAL ENCOUNTER (OUTPATIENT)
Dept: INFUSION CENTER | Facility: CLINIC | Age: 64
Discharge: HOME | End: 2019-12-30
Payer: COMMERCIAL

## 2019-12-30 VITALS — TEMPERATURE: 97.2 F

## 2019-12-30 DIAGNOSIS — Z17.0 MALIGNANT NEOPLASM OF LOWER-OUTER QUADRANT OF LEFT BREAST OF FEMALE, ESTROGEN RECEPTOR POSITIVE (HCC): ICD-10-CM

## 2019-12-30 DIAGNOSIS — D70.1 CHEMOTHERAPY INDUCED NEUTROPENIA (HCC): Primary | ICD-10-CM

## 2019-12-30 DIAGNOSIS — C50.512 MALIGNANT NEOPLASM OF LOWER-OUTER QUADRANT OF LEFT BREAST OF FEMALE, ESTROGEN RECEPTOR POSITIVE (HCC): ICD-10-CM

## 2019-12-30 DIAGNOSIS — T45.1X5A CHEMOTHERAPY INDUCED NEUTROPENIA (HCC): ICD-10-CM

## 2019-12-30 DIAGNOSIS — D70.1 CHEMOTHERAPY INDUCED NEUTROPENIA (HCC): ICD-10-CM

## 2019-12-30 DIAGNOSIS — T45.1X5A CHEMOTHERAPY INDUCED NEUTROPENIA (HCC): Primary | ICD-10-CM

## 2019-12-30 PROCEDURE — 96372 THER/PROPH/DIAG INJ SC/IM: CPT

## 2019-12-30 RX ADMIN — PEGFILGRASTIM 6 MG: 6 INJECTION SUBCUTANEOUS at 11:47

## 2019-12-30 NOTE — PLAN OF CARE
Problem: PAIN - ADULT  Goal: Verbalizes/displays adequate comfort level or baseline comfort level  Description  Interventions:  - Encourage patient to monitor pain and request assistance  - Assess pain using appropriate pain scale  - Administer analgesics based on type and severity of pain and evaluate response  - Implement non-pharmacological measures as appropriate and evaluate response  - Consider cultural and social influences on pain and pain management  - Notify physician/advanced practitioner if interventions unsuccessful or patient reports new pain  Outcome: Progressing     Problem: INFECTION - ADULT  Goal: Absence or prevention of progression during hospitalization  Description  INTERVENTIONS:  - Assess and monitor for signs and symptoms of infection  - Monitor lab/diagnostic results  - Monitor all insertion sites, i e  indwelling lines, tubes, and drains  - Monitor endotracheal if appropriate and nasal secretions for changes in amount and color  - [place] appropriate cooling/warming therapies per order  - Administer medications as ordered  - Instruct and encourage patient and family to use good hand hygiene technique  - Identify and instruct in appropriate isolation precautions for identified infection/condition  Outcome: Progressing  Goal: Absence of fever/infection during neutropenic period  Description  INTERVENTIONS:  - Monitor WBC    Outcome: Progressing     Problem: Knowledge Deficit  Goal: Patient/family/caregiver demonstrates understanding of disease process, treatment plan, medications, and discharge instructions  Description  Complete learning assessment and assess knowledge base    Interventions:  - Provide teaching at level of understanding  - Provide teaching via preferred learning methods  Outcome: Progressing

## 2019-12-30 NOTE — PROGRESS NOTES
Pt  Denies new symptoms or concerns today  Pt  Returned Neulasta onpro; RN gave to [employer] to return for reimbursement  Neualsta 6mg given SQ as ordered  Future appointments reviewed  AVS declined

## 2020-01-13 ENCOUNTER — OFFICE VISIT (OUTPATIENT)
Dept: HEMATOLOGY ONCOLOGY | Facility: CLINIC | Age: 65
End: 2020-01-13
Payer: COMMERCIAL

## 2020-01-13 VITALS
TEMPERATURE: 96.9 F | WEIGHT: 174 LBS | DIASTOLIC BLOOD PRESSURE: 82 MMHG | HEART RATE: 88 BPM | HEIGHT: 61 IN | RESPIRATION RATE: 18 BRPM | SYSTOLIC BLOOD PRESSURE: 142 MMHG | BODY MASS INDEX: 32.85 KG/M2 | OXYGEN SATURATION: 98 %

## 2020-01-13 DIAGNOSIS — Z17.0 MALIGNANT NEOPLASM OF LOWER-OUTER QUADRANT OF LEFT BREAST OF FEMALE, ESTROGEN RECEPTOR POSITIVE (HCC): Primary | ICD-10-CM

## 2020-01-13 DIAGNOSIS — C50.512 MALIGNANT NEOPLASM OF LOWER-OUTER QUADRANT OF LEFT BREAST OF FEMALE, ESTROGEN RECEPTOR POSITIVE (HCC): Primary | ICD-10-CM

## 2020-01-13 PROCEDURE — 99214 OFFICE O/P EST MOD 30 MIN: CPT | Performed by: INTERNAL MEDICINE

## 2020-01-13 NOTE — PROGRESS NOTES
Hematology / Oncology Outpatient Follow Up Note    Mark Banegas 62 y o  female :1955 CVV:[id number]         Date:  2020    Assessment / Plan:  A 71-year-old postmenopausal woman with stage IIA left breast cancer, grade 3, ER 70% positive, [place] negative, HER2 3+ disease   She underwent lumpectomy and sentinel lymph node biopsy, resulting in GREG   She is negative for BRCA gene mutation  She is currently on adjuvant chemotherapy with NEGRO SOUT[facility] with excellent tolerance  Clinically, she has no evidence recurrent disease  Assuming that she has adequate ANC, she is going to have 3rd cycle of NEGRO SOUTHE[facility] later this week  I recommended her to stay on current regimen every 3 weeks  I will see her again prior to the of 5th cycle of adjuvant chemotherapy of which time I would consider ordering echocardiogram   She is in agreement with my recommendations            Subjective:      HPI:  A 71-year-old postmenopausal woman who noticed a lump in her left breast which she brought to medical attention   She underwent left breast biopsy in 2019 which showed invasive ductal carcinoma, grade 1  This was ER 70% positive, [place] negative, HER2 3+ disease   She underwent genetic testing which was negative for BRCA gene mutation   She underwent lumpectomy and sentinel lymph node biopsy by Dr Martin Borjas to this, she had CT scan of chest abdomen pelvis which showed no obvious distant metastasis   However, she has multiple indeterminate liver lesions   Her LFT is mildly elevated   She has no history of viral hepatitis   However, she drinks 2 alcohol every day for nearly 40 years   Intermittently, she drinks more   Lumpectomy showed 2 2 cm of invasive ductal carcinoma, grade 3   There was no evidence of lymphovascular invasion   1 sentinel lymph node was negative for metastatic disease   She presents today to discuss adjuvant treatment options   She has no complaint of pain   Her weight is stable   She has no respiratory jessica  Will Adeel has no family history of breast or ovarian cancer   She quit smoking nearly 10 years ago   Her performance status is normal            Interval History:  A 62year-old postmenopausal woman with stage IIA left breast cancer, grade 3, ER 70% positive, [place] negative, HER2 3+ disease   She underwent lumpectomy and sentinel lymph node biopsy, resulting in GREG   She is negative for BRCA gene mutation  She is currently on adjuvant chemotherapy with NEGRO SOUTHE[facility] with excellent tolerance  She is going to have 3rd cycle of adjuvant chemotherapy later this week  She presents today for routine follow-up  She has no history of neutropenic fever  She has no nausea vomiting  However, she has slowly progressive fatigue  Her weight is stable  She has no respiratory symptoms  She denied any skin rash  Her performance status is normal           Objective:      Primary Diagnosis:     1  Left breast cancer, stage IIA (pT2, pN0, M0) grade 3, ER 70% positive, [place] negative, HER2 3+ disease   Diagnosed in October 2019       2  BRCA gene mutation negative      Cancer Staging:  Cancer Staging  Malignant neoplasm of lower-outer quadrant of left breast of female, estrogen receptor positive ([facility] [address] )  Staging form: Breast, AJCC 8th Edition  - Pathologic: Stage IIA (pT2, pN0(sn), cM0, G3, ER+, KS-, HER2+) - Unsigned  Neoadjuvant therapy: No  Laterality: Left  Tumor size (mm): 22  Method of lymph node assessment: [place] lymph node biopsy  Histologic grading system: 3 grade system           Previous Hematologic/ Oncologic Treatment:            Current Hematologic/ Oncologic Treatment:       Adjuvant chemotherapy with TCH x6 cycle     3rd cycle to be given in January 16, 2020       Disease Status:      GREG is status post lumpectomy and sentinel lymph node biopsy      Test Results:     Pathology:     2 2 cm of invasive ductal carcinoma, grade 3   No evidence of lymphovascular invasion   1 sentinel lymph node was negative for metastatic disease   ER 70% positive, [place] negative, HER2 3+ disease   Stage IIA (pT2, pN0, M0)     Radiology:     CT scan of chest abdomen pelvis showed multiple indeterminate liver lesion, likely to be cyst   MRI of the liver was recommended      Laboratory:     See below      Physical Exam:        General Appearance:    Alert, oriented          Eyes:    PERRL   Ears:    Normal external ear canals, both ears   Nose:   Nares normal, septum midline   Throat:   Mucosa moist  Pharynx without injection  Neck:   Supple         Lungs:     Clear to auscultation bilaterally   Chest Wall:    No tenderness or deformity    Heart:    Regular rate and rhythm         Abdomen:     Soft, non-tender, bowel sounds +, liver is palpable 3 cm below right costal margin   Spleen is not palpable                Extremities:   Extremities no cyanosis or edema         Skin:   no rash or icterus  Lymph nodes:   Cervical, supraclavicular, and axillary nodes normal   Neurologic:   CNII-XII intact, normal strength, sensation and reflexes     Throughout             Breast exam:   Lumpectomy scar at 6 o'clock position in her left breast with no palpable abnormalities   Right breast exam is negative  ROS: Review of Systems   Constitutional:        Fatigue   All other systems reviewed and are negative  Imaging: No results found  Labs:   Lab Results   Component Value Date    WBC 6 17 12/26/2019    HGB 12 1 12/26/2019    HCT 37 6 12/26/2019    MCV 96 12/26/2019     12/26/2019     Lab Results   Component Value Date    K 3 5 12/26/2019     12/26/2019    CO2 26 12/26/2019    BUN 11 12/26/2019    CREATININE 0 50 (L) 12/26/2019    GLUF 102 (H) 10/08/2019    CALCIUM 8 9 12/26/2019    AST 37 12/26/2019    ALT 57 12/26/2019    ALKPHOS 89 12/26/2019    EGFR 103 12/26/2019           Current Medications: Reviewed  Allergies: Reviewed  PMH/FH/SH:  Reviewed      Vital Sign:    Body surface area is 1 77 meters squared      Wt Readings from Last 3 Encounters:   01/13/20 78 9 kg (174 lb)   12/27/19 78 kg (171 lb 15 3 oz)   12/20/19 78 5 kg (173 lb)        Temp Readings from Last 3 Encounters:   01/13/20 (!) 96 9 °F (36 1 °C) (Tympanic Core)   12/30/19 (!) 97 2 °F (36 2 °C)   12/27/19 (!) 97 3 °F (36 3 °C) (Temporal)        BP Readings from Last 3 Encounters:   01/13/20 142/82   12/27/19 167/97   12/20/19 138/84         Pulse Readings from Last 3 Encounters:   01/13/20 88   12/27/19 94   12/20/19 105     @LASTSAO2(3)@

## 2020-01-14 RX ORDER — SODIUM CHLORIDE 9 MG/ML
20 INJECTION, SOLUTION INTRAVENOUS ONCE
Status: CANCELLED | OUTPATIENT
Start: 2020-01-16

## 2020-01-16 ENCOUNTER — HOSPITAL ENCOUNTER (OUTPATIENT)
Dept: INFUSION CENTER | Facility: CLINIC | Age: 65
Discharge: HOME | End: 2020-01-16
Payer: COMMERCIAL

## 2020-01-16 DIAGNOSIS — T45.1X5A CHEMOTHERAPY INDUCED NEUTROPENIA (HCC): Primary | ICD-10-CM

## 2020-01-16 DIAGNOSIS — Z17.0 MALIGNANT NEOPLASM OF LOWER-OUTER QUADRANT OF LEFT BREAST OF FEMALE, ESTROGEN RECEPTOR POSITIVE (HCC): ICD-10-CM

## 2020-01-16 DIAGNOSIS — D70.1 CHEMOTHERAPY INDUCED NEUTROPENIA (HCC): Primary | ICD-10-CM

## 2020-01-16 DIAGNOSIS — C50.512 MALIGNANT NEOPLASM OF LOWER-OUTER QUADRANT OF LEFT BREAST OF FEMALE, ESTROGEN RECEPTOR POSITIVE (HCC): ICD-10-CM

## 2020-01-16 DIAGNOSIS — Z95.828 PORT-A-CATH IN PLACE: ICD-10-CM

## 2020-01-16 LAB
ALBUMIN SERPL BCP-MCNC: 3.1 G/DL (ref 3.5–5.7)
ALP SERPL-CCNC: 82 U/L (ref 46–116)
ALT SERPL W P-5'-P-CCNC: 57 U/L (ref 12–78)
ANION GAP SERPL CALCULATED.3IONS-SCNC: 6 MMOL/L (ref 4–13)
AST SERPL W P-5'-P-CCNC: 38 U/L (ref 5–45)
BASOPHILS # BLD AUTO: 0.03 THOUSANDS/ΜL (ref 0–0.1)
BASOPHILS NFR BLD AUTO: 1 % (ref 0–1)
BILIRUB SERPL-MCNC: 0.7 MG/DL (ref 0.2–1)
BUN SERPL-MCNC: 11 MG/DL (ref 5–25)
CALCIUM SERPL-MCNC: 9.3 MG/DL (ref 8.3–10.1)
CHLORIDE SERPL-SCNC: 106 MMOL/L (ref 98–108)
CO2 SERPL-SCNC: 27 MMOL/L (ref 21–32)
CREAT SERPL-MCNC: 0.4 MG/DL (ref 0.6–1.3)
EOSINOPHIL # BLD AUTO: 0 THOUSAND/ΜL (ref 0–0.61)
EOSINOPHIL NFR BLD AUTO: 0 % (ref 0–6)
ERYTHROCYTE [DISTWIDTH] IN BLOOD BY AUTOMATED COUNT: 16.2 % (ref 11.6–15.1)
GFR SERPL CREATININE-BSD FRML MDRD: 111 ML/MIN/1.73SQ M
GLUCOSE SERPL-MCNC: 125 MG/DL (ref 65–140)
HCT VFR BLD AUTO: 33.7 % (ref 34.8–46.1)
HGB BLD-MCNC: 10.9 G/DL (ref 11.5–15.4)
LYMPHOCYTES # BLD AUTO: 1.3 THOUSANDS/ΜL (ref 0.6–4.47)
LYMPHOCYTES NFR BLD AUTO: 20 % (ref 14–44)
MCH RBC QN AUTO: 32.2 PG (ref 26.8–34.3)
MCHC RBC AUTO-ENTMCNC: 32.3 G/DL (ref 31.4–37.4)
MCV RBC AUTO: 99 FL (ref 82–98)
MONOCYTES # BLD AUTO: 0.62 THOUSAND/ΜL (ref 0.17–1.22)
MONOCYTES NFR BLD AUTO: 10 % (ref 4–12)
NEUTROPHILS # BLD AUTO: 4.47 THOUSANDS/ΜL (ref 1.85–7.62)
NEUTS SEG NFR BLD AUTO: 69 % (ref 43–75)
PLATELET # BLD AUTO: 209 THOUSANDS/UL (ref 149–390)
PMV BLD AUTO: 9.1 FL (ref 8.9–12.7)
POTASSIUM SERPL-SCNC: 3.6 MMOL/L (ref 3.5–5.3)
PROT SERPL-MCNC: 6.2 G/DL (ref 6.4–8.2)
RBC # BLD AUTO: 3.39 MILLION/UL (ref 3.81–5.12)
SODIUM SERPL-SCNC: 139 MMOL/L (ref 136–145)
WBC # BLD AUTO: 6.42 THOUSAND/UL (ref 4.31–10.16)

## 2020-01-16 PROCEDURE — 80053 COMPREHEN METABOLIC PANEL: CPT | Performed by: INTERNAL MEDICINE

## 2020-01-16 PROCEDURE — 85025 COMPLETE CBC W/AUTO DIFF WBC: CPT | Performed by: INTERNAL MEDICINE

## 2020-01-16 NOTE — PLAN OF CARE
Problem: Potential for Falls  Goal: Patient will remain free of falls  Description  INTERVENTIONS:  - Assess patient frequently for physical needs  -  Identify cognitive and physical deficits and behaviors that affect risk of falls    -  [place] fall precautions as indicated by assessment   - Educate patient/family on patient safety including physical limitations  - Instruct patient to call for assistance with activity based on assessment  - Modify environment to reduce risk of injury  - Consider OT/PT consult to assist with strengthening/mobility  Outcome: Progressing

## 2020-01-16 NOTE — PROGRESS NOTES
Labs drawn from port, good blood return noted  Flushed per protocol  Aware of return appointment tomorrow

## 2020-01-17 ENCOUNTER — HOSPITAL ENCOUNTER (OUTPATIENT)
Dept: INFUSION CENTER | Facility: CLINIC | Age: 65
Discharge: HOME | End: 2020-01-17
Payer: COMMERCIAL

## 2020-01-17 VITALS
DIASTOLIC BLOOD PRESSURE: 92 MMHG | HEIGHT: 61 IN | HEART RATE: 93 BPM | RESPIRATION RATE: 18 BRPM | WEIGHT: 173.28 LBS | BODY MASS INDEX: 32.72 KG/M2 | TEMPERATURE: 97.7 F | SYSTOLIC BLOOD PRESSURE: 169 MMHG

## 2020-01-17 DIAGNOSIS — T45.1X5A CHEMOTHERAPY INDUCED NEUTROPENIA (HCC): Primary | ICD-10-CM

## 2020-01-17 DIAGNOSIS — Z17.0 MALIGNANT NEOPLASM OF LOWER-OUTER QUADRANT OF LEFT BREAST OF FEMALE, ESTROGEN RECEPTOR POSITIVE (HCC): ICD-10-CM

## 2020-01-17 DIAGNOSIS — D70.1 CHEMOTHERAPY INDUCED NEUTROPENIA (HCC): Primary | ICD-10-CM

## 2020-01-17 DIAGNOSIS — C50.512 MALIGNANT NEOPLASM OF LOWER-OUTER QUADRANT OF LEFT BREAST OF FEMALE, ESTROGEN RECEPTOR POSITIVE (HCC): ICD-10-CM

## 2020-01-17 PROCEDURE — 96413 CHEMO IV INFUSION 1 HR: CPT

## 2020-01-17 PROCEDURE — 96377 APPLICATON ON-BODY INJECTOR: CPT

## 2020-01-17 PROCEDURE — 96417 CHEMO IV INFUS EACH ADDL SEQ: CPT

## 2020-01-17 PROCEDURE — 96367 TX/PROPH/DG ADDL SEQ IV INF: CPT

## 2020-01-17 RX ORDER — SODIUM CHLORIDE 9 MG/ML
20 INJECTION, SOLUTION INTRAVENOUS ONCE
Status: COMPLETED | OUTPATIENT
Start: 2020-01-17 | End: 2020-01-17

## 2020-01-17 RX ADMIN — CARBOPLATIN 616.8 MG: 10 INJECTION, SOLUTION INTRAVENOUS at 11:19

## 2020-01-17 RX ADMIN — SODIUM CHLORIDE 150 MG: 0.9 INJECTION, SOLUTION INTRAVENOUS at 09:30

## 2020-01-17 RX ADMIN — PEGFILGRASTIM 6 MG: KIT SUBCUTANEOUS at 12:34

## 2020-01-17 RX ADMIN — TRASTUZUMAB 450 MG: 150 INJECTION, POWDER, LYOPHILIZED, FOR SOLUTION INTRAVENOUS at 12:29

## 2020-01-17 RX ADMIN — SODIUM CHLORIDE 20 ML/HR: 0.9 INJECTION, SOLUTION INTRAVENOUS at 09:08

## 2020-01-17 RX ADMIN — DEXAMETHASONE SODIUM PHOSPHATE: 10 INJECTION, SOLUTION INTRAMUSCULAR; INTRAVENOUS at 09:08

## 2020-01-17 RX ADMIN — DOCETAXEL 132.8 MG: 20 INJECTION, SOLUTION, CONCENTRATE INTRAVENOUS at 10:19

## 2020-01-17 NOTE — PROGRESS NOTES
Pt arrived to unit without complaint, tolerated treatment without any adverse reaction  Pt left unit in stable condition, Neulasta on pro on pt's L arm  Pt knowledgeable re: monitoring  And removal of device

## 2020-01-17 NOTE — PLAN OF CARE
Problem: Potential for Falls  Goal: Patient will remain free of falls  Description  INTERVENTIONS:  - Assess patient frequently for physical needs  -  Identify cognitive and physical deficits and behaviors that affect risk of falls    -  [place] fall precautions as indicated by assessment   - Educate patient/family on patient safety including physical limitations  - Instruct patient to call for assistance with activity based on assessment  - Modify environment to reduce risk of injury  - Consider OT/PT consult to assist with strengthening/mobility  Outcome: Progressing

## 2020-02-04 RX ORDER — SODIUM CHLORIDE 9 MG/ML
20 INJECTION, SOLUTION INTRAVENOUS ONCE
Status: CANCELLED | OUTPATIENT
Start: 2020-02-06

## 2020-02-06 ENCOUNTER — HOSPITAL ENCOUNTER (OUTPATIENT)
Dept: INFUSION CENTER | Facility: CLINIC | Age: 65
Discharge: HOME | End: 2020-02-06
Payer: COMMERCIAL

## 2020-02-06 DIAGNOSIS — C50.512 MALIGNANT NEOPLASM OF LOWER-OUTER QUADRANT OF LEFT BREAST OF FEMALE, ESTROGEN RECEPTOR POSITIVE (HCC): ICD-10-CM

## 2020-02-06 DIAGNOSIS — D70.1 CHEMOTHERAPY INDUCED NEUTROPENIA (HCC): ICD-10-CM

## 2020-02-06 DIAGNOSIS — T45.1X5A CHEMOTHERAPY INDUCED NEUTROPENIA (HCC): ICD-10-CM

## 2020-02-06 DIAGNOSIS — Z17.0 MALIGNANT NEOPLASM OF LOWER-OUTER QUADRANT OF LEFT BREAST OF FEMALE, ESTROGEN RECEPTOR POSITIVE (HCC): ICD-10-CM

## 2020-02-06 DIAGNOSIS — Z95.828 PORT-A-CATH IN PLACE: Primary | ICD-10-CM

## 2020-02-06 LAB
ALBUMIN SERPL BCP-MCNC: 3.2 G/DL (ref 3.5–5.7)
ALP SERPL-CCNC: 69 U/L (ref 46–116)
ALT SERPL W P-5'-P-CCNC: 57 U/L (ref 12–78)
ANION GAP SERPL CALCULATED.3IONS-SCNC: 7 MMOL/L (ref 4–13)
AST SERPL W P-5'-P-CCNC: 46 U/L (ref 5–45)
BASOPHILS # BLD AUTO: 0.03 THOUSANDS/ΜL (ref 0–0.1)
BASOPHILS NFR BLD AUTO: 1 % (ref 0–1)
BILIRUB SERPL-MCNC: 0.7 MG/DL (ref 0.2–1)
BUN SERPL-MCNC: 13 MG/DL (ref 5–25)
CALCIUM SERPL-MCNC: 9.2 MG/DL (ref 8.3–10.1)
CHLORIDE SERPL-SCNC: 106 MMOL/L (ref 98–108)
CO2 SERPL-SCNC: 27 MMOL/L (ref 21–32)
CREAT SERPL-MCNC: 0.6 MG/DL (ref 0.6–1.3)
EOSINOPHIL # BLD AUTO: 0 THOUSAND/ΜL (ref 0–0.61)
EOSINOPHIL NFR BLD AUTO: 0 % (ref 0–6)
ERYTHROCYTE [DISTWIDTH] IN BLOOD BY AUTOMATED COUNT: 17.5 % (ref 11.6–15.1)
GFR SERPL CREATININE-BSD FRML MDRD: 97 ML/MIN/1.73SQ M
GLUCOSE SERPL-MCNC: 109 MG/DL (ref 65–140)
HCT VFR BLD AUTO: 36.2 % (ref 34.8–46.1)
HGB BLD-MCNC: 11.6 G/DL (ref 11.5–15.4)
LYMPHOCYTES # BLD AUTO: 0.96 THOUSANDS/ΜL (ref 0.6–4.47)
LYMPHOCYTES NFR BLD AUTO: 17 % (ref 14–44)
MCH RBC QN AUTO: 32.7 PG (ref 26.8–34.3)
MCHC RBC AUTO-ENTMCNC: 32 G/DL (ref 31.4–37.4)
MCV RBC AUTO: 102 FL (ref 82–98)
MONOCYTES # BLD AUTO: 0.81 THOUSAND/ΜL (ref 0.17–1.22)
MONOCYTES NFR BLD AUTO: 14 % (ref 4–12)
NEUTROPHILS # BLD AUTO: 3.91 THOUSANDS/ΜL (ref 1.85–7.62)
NEUTS SEG NFR BLD AUTO: 68 % (ref 43–75)
PLATELET # BLD AUTO: 194 THOUSANDS/UL (ref 149–390)
PMV BLD AUTO: 9 FL (ref 8.9–12.7)
POTASSIUM SERPL-SCNC: 3.7 MMOL/L (ref 3.5–5.3)
PROT SERPL-MCNC: 6.2 G/DL (ref 6.4–8.2)
RBC # BLD AUTO: 3.55 MILLION/UL (ref 3.81–5.12)
SODIUM SERPL-SCNC: 140 MMOL/L (ref 136–145)
WBC # BLD AUTO: 5.71 THOUSAND/UL (ref 4.31–10.16)

## 2020-02-06 PROCEDURE — 85025 COMPLETE CBC W/AUTO DIFF WBC: CPT | Performed by: INTERNAL MEDICINE

## 2020-02-06 PROCEDURE — 80053 COMPREHEN METABOLIC PANEL: CPT | Performed by: INTERNAL MEDICINE

## 2020-02-06 NOTE — PROGRESS NOTES
Blood work collected via port a cath  Port flushed per protocol  Pt is aware to return tomorrow for chemotherapy infusion   Refused AVS

## 2020-02-06 NOTE — PLAN OF CARE
Problem: Potential for Falls  Goal: Patient will remain free of falls  Description  INTERVENTIONS:  - Assess patient frequently for physical needs  -  Identify cognitive and physical deficits and behaviors that affect risk of falls    -  [place] fall precautions as indicated by assessment   - Educate patient/family on patient safety including physical limitations  - Instruct patient to call for assistance with activity based on assessment  - Modify environment to reduce risk of injury  - Consider OT/PT consult to assist with strengthening/mobility  Outcome: Progressing

## 2020-02-07 ENCOUNTER — HOSPITAL ENCOUNTER (OUTPATIENT)
Dept: INFUSION CENTER | Facility: CLINIC | Age: 65
Discharge: HOME | End: 2020-02-07
Payer: COMMERCIAL

## 2020-02-07 VITALS
WEIGHT: 171.96 LBS | HEIGHT: 61 IN | RESPIRATION RATE: 18 BRPM | TEMPERATURE: 97.5 F | SYSTOLIC BLOOD PRESSURE: 146 MMHG | DIASTOLIC BLOOD PRESSURE: 90 MMHG | BODY MASS INDEX: 32.47 KG/M2

## 2020-02-07 DIAGNOSIS — D70.1 CHEMOTHERAPY INDUCED NEUTROPENIA (HCC): Primary | ICD-10-CM

## 2020-02-07 DIAGNOSIS — Z17.0 MALIGNANT NEOPLASM OF LOWER-OUTER QUADRANT OF LEFT BREAST OF FEMALE, ESTROGEN RECEPTOR POSITIVE (HCC): ICD-10-CM

## 2020-02-07 DIAGNOSIS — C50.512 MALIGNANT NEOPLASM OF LOWER-OUTER QUADRANT OF LEFT BREAST OF FEMALE, ESTROGEN RECEPTOR POSITIVE (HCC): ICD-10-CM

## 2020-02-07 DIAGNOSIS — T45.1X5A CHEMOTHERAPY INDUCED NEUTROPENIA (HCC): Primary | ICD-10-CM

## 2020-02-07 PROCEDURE — 96367 TX/PROPH/DG ADDL SEQ IV INF: CPT

## 2020-02-07 PROCEDURE — 96413 CHEMO IV INFUSION 1 HR: CPT

## 2020-02-07 PROCEDURE — 96417 CHEMO IV INFUS EACH ADDL SEQ: CPT

## 2020-02-07 PROCEDURE — 96377 APPLICATON ON-BODY INJECTOR: CPT

## 2020-02-07 RX ORDER — SODIUM CHLORIDE 9 MG/ML
20 INJECTION, SOLUTION INTRAVENOUS ONCE
Status: COMPLETED | OUTPATIENT
Start: 2020-02-07 | End: 2020-02-07

## 2020-02-07 RX ADMIN — TRASTUZUMAB 450 MG: 150 INJECTION, POWDER, LYOPHILIZED, FOR SOLUTION INTRAVENOUS at 12:31

## 2020-02-07 RX ADMIN — SODIUM CHLORIDE 20 ML/HR: 0.9 INJECTION, SOLUTION INTRAVENOUS at 09:15

## 2020-02-07 RX ADMIN — DEXAMETHASONE SODIUM PHOSPHATE: 10 INJECTION, SOLUTION INTRAMUSCULAR; INTRAVENOUS at 09:15

## 2020-02-07 RX ADMIN — SODIUM CHLORIDE 150 MG: 0.9 INJECTION, SOLUTION INTRAVENOUS at 09:37

## 2020-02-07 RX ADMIN — CARBOPLATIN 616.8 MG: 10 INJECTION, SOLUTION INTRAVENOUS at 11:16

## 2020-02-07 RX ADMIN — DOCETAXEL 132.8 MG: 20 INJECTION, SOLUTION, CONCENTRATE INTRAVENOUS at 10:11

## 2020-02-07 RX ADMIN — PEGFILGRASTIM 6 MG: KIT SUBCUTANEOUS at 12:42

## 2020-02-12 ENCOUNTER — DOCUMENTATION (OUTPATIENT)
Dept: INFUSION CENTER | Facility: CLINIC | Age: 65
End: 2020-02-12

## 2020-02-17 ENCOUNTER — TELEPHONE (OUTPATIENT)
Dept: INFUSION CENTER | Facility: CLINIC | Age: 65
End: 2020-02-17

## 2020-02-24 ENCOUNTER — OFFICE VISIT (OUTPATIENT)
Dept: HEMATOLOGY ONCOLOGY | Facility: CLINIC | Age: 65
End: 2020-02-24
Payer: COMMERCIAL

## 2020-02-24 VITALS
OXYGEN SATURATION: 98 % | TEMPERATURE: 96.5 F | DIASTOLIC BLOOD PRESSURE: 68 MMHG | RESPIRATION RATE: 18 BRPM | HEIGHT: 61 IN | WEIGHT: 174 LBS | BODY MASS INDEX: 32.85 KG/M2 | SYSTOLIC BLOOD PRESSURE: 128 MMHG | HEART RATE: 112 BPM

## 2020-02-24 DIAGNOSIS — T45.1X5A CHEMOTHERAPY INDUCED NEUTROPENIA (HCC): ICD-10-CM

## 2020-02-24 DIAGNOSIS — Z17.0 MALIGNANT NEOPLASM OF LOWER-OUTER QUADRANT OF LEFT BREAST OF FEMALE, ESTROGEN RECEPTOR POSITIVE (HCC): Primary | ICD-10-CM

## 2020-02-24 DIAGNOSIS — C50.512 MALIGNANT NEOPLASM OF LOWER-OUTER QUADRANT OF LEFT BREAST OF FEMALE, ESTROGEN RECEPTOR POSITIVE (HCC): Primary | ICD-10-CM

## 2020-02-24 DIAGNOSIS — D70.1 CHEMOTHERAPY INDUCED NEUTROPENIA (HCC): ICD-10-CM

## 2020-02-24 PROCEDURE — 99214 OFFICE O/P EST MOD 30 MIN: CPT | Performed by: INTERNAL MEDICINE

## 2020-02-24 RX ORDER — SODIUM CHLORIDE 9 MG/ML
20 INJECTION, SOLUTION INTRAVENOUS ONCE
Status: CANCELLED | OUTPATIENT
Start: 2020-05-22

## 2020-02-24 RX ORDER — SODIUM CHLORIDE 9 MG/ML
20 INJECTION, SOLUTION INTRAVENOUS ONCE
Status: CANCELLED | OUTPATIENT
Start: 2020-06-12

## 2020-02-24 RX ORDER — SODIUM CHLORIDE 9 MG/ML
20 INJECTION, SOLUTION INTRAVENOUS ONCE
Status: CANCELLED | OUTPATIENT
Start: 2020-05-01

## 2020-02-24 RX ORDER — SODIUM CHLORIDE 9 MG/ML
20 INJECTION, SOLUTION INTRAVENOUS ONCE
Status: CANCELLED | OUTPATIENT
Start: 2020-04-09

## 2020-02-24 RX ORDER — SODIUM CHLORIDE 9 MG/ML
20 INJECTION, SOLUTION INTRAVENOUS ONCE
Status: CANCELLED | OUTPATIENT
Start: 2020-07-03

## 2020-02-24 NOTE — PROGRESS NOTES
Hematology / Oncology Outpatient Follow Up Note    Nola Barros 62 y o  female :1955 QLT:[id number]         Date:  2020    Assessment / Plan:  A 27-year-old postmenopausal woman with stage IIA left breast cancer, grade 3, ER 70% positive, RI negative, HER2 3+ disease   She underwent lumpectomy and sentinel lymph node biopsy, resulting in GREG   She is negative for BRCA gene mutation  She is currently on adjuvant chemotherapy with [address] with excellent tolerance, except progressive cumulative fatigue as well as tearing  Her tearing is probably due to the steroid  I am going to reduced dexamethasone to 4 mg  She is going to complete adjuvant chemotherapy in 2020   I will refer her to radiation oncologist   I recommended her to have echocardiogram again for cardiac monitoring in next week  She is in agreement with my recommendation  I will see her again in 3 months for routine follow-up at which time I would put her on aromatase inhibitors         Subjective:      HPI:  A 27-year-old postmenopausal woman who noticed a lump in her left breast which she brought to medical attention   She underwent left breast biopsy in 2019 which showed invasive ductal carcinoma, grade 1  This was ER 70% positive, RI negative, HER2 3+ disease   She underwent genetic testing which was negative for BRCA gene mutation   She underwent lumpectomy and sentinel lymph node biopsy by Dr Amparo Pringle to this, she had CT scan of chest abdomen pelvis which showed no obvious distant metastasis   However, she has multiple indeterminate liver lesions   Her LFT is mildly elevated   She has no history of viral hepatitis   However, she drinks 2 alcohol every day for nearly 40 years   Intermittently, she drinks more   Lumpectomy showed 2 2 cm of invasive ductal carcinoma, grade 3   There was no evidence of lymphovascular invasion   1 sentinel lymph node was negative for metastatic disease   She presents today to discuss adjuvant treatment options   She has no complaint of pain   Her weight is stable   She has no respiratory symptoms   She has no family history of breast or ovarian cancer   She quit smoking nearly 10 years ago   Her performance status is normal            Interval History:  A 62year-old postmenopausal woman with stage IIA left breast cancer, grade 3, ER 70% positive, NH negative, HER2 3+ disease   She underwent lumpectomy and sentinel lymph node biopsy, resulting in GREG   She is negative for BRCA gene mutation  She is currently on adjuvant chemotherapy with [facility] with excellent tolerance  She presents today prior to the 5th cycle of adjuvant chemotherapy  She has slowly progressive fatigue as well as hearing bilateral eye  She has no vision problem  She has no nausea vomiting  She has no history of neutropenic fever  Her weight is stable  She has no skin rash  She denied any pain    Her performance status is normal        Objective:      Primary Diagnosis:     1  Left breast cancer, stage IIA (pT2, pN0, M0) grade 3, ER 70% positive, NH negative, HER2 3+ disease   Diagnosed in October 2019       2  BRCA gene mutation negative      Cancer Staging:  Cancer Staging  Malignant neoplasm of lower-outer quadrant of left breast of female, estrogen receptor positive ([facility] [address] )  Staging form: Breast, AJCC 8th Edition  - Pathologic: Stage IIA (pT2, pN0(sn), cM0, G3, ER+, NH-, HER2+) - Unsigned  Neoadjuvant therapy: No  Laterality: Left  Tumor size (mm): 22  Method of lymph node assessment: [place] lymph node biopsy  Histologic grading system: 3 grade system           Previous Hematologic/ Oncologic Treatment:            Current Hematologic/ Oncologic Treatment:       Adjuvant chemotherapy with TCH x6 cycle     5th cycle to be given later this week         Disease Status:      GREG is status post lumpectomy and sentinel lymph node biopsy      Test Results:     Pathology:     2 2 cm of invasive ductal carcinoma, grade 3  No evidence of lymphovascular invasion   1 sentinel lymph node was negative for metastatic disease   ER 70% positive, AL negative, HER2 3+ disease   Stage IIA (pT2, pN0, M0)     Radiology:     CT scan of chest abdomen pelvis showed multiple indeterminate liver lesion, likely to be cyst       Laboratory:     See below      Physical Exam:        General Appearance:    Alert, oriented          Eyes:    PERRL   Ears:    Normal external ear canals, both ears   Nose:   Nares normal, septum midline   Throat:   Mucosa moist  Pharynx without injection  Neck:   Supple         Lungs:     Clear to auscultation bilaterally   Chest Wall:    No tenderness or deformity    Heart:    Regular rate and rhythm         Abdomen:     Soft, non-tender, bowel sounds +, liver is palpable 3 cm below right costal margin   Spleen is not palpable                Extremities:   Extremities no cyanosis or edema         Skin:   no rash or icterus  Lymph nodes:   Cervical, supraclavicular, and axillary nodes normal   Neurologic:   CNII-XII intact, normal strength, sensation and reflexes     Throughout             Breast exam:   Lumpectomy scar at 6 o'clock position in her left breast with no palpable abnormalities   Right breast exam is negative             ROS: Review of Systems   Constitutional:        Fatigue   HENT:        Tearing   All other systems reviewed and are negative  Imaging: No results found        Labs:   Lab Results   Component Value Date    WBC 5 71 02/06/2020    HGB 11 6 02/06/2020    HCT 36 2 02/06/2020     (H) 02/06/2020     02/06/2020     Lab Results   Component Value Date    K 3 7 02/06/2020     02/06/2020    CO2 27 02/06/2020    BUN 13 02/06/2020    CREATININE 0 60 02/06/2020    GLUF 102 (H) 10/08/2019    CALCIUM 9 2 02/06/2020    AST 46 (H) 02/06/2020    ALT 57 02/06/2020    ALKPHOS 69 02/06/2020    EGFR 97 02/06/2020         Current Medications: Reviewed  Allergies: Reviewed  PMH/FH/SH: Reviewed      Vital Sign:    Body surface area is 1 77 meters squared      Wt Readings from Last 3 Encounters:   02/24/20 78 9 kg (174 lb)   02/07/20 78 kg (171 lb 15 3 oz)   01/17/20 78 6 kg (173 lb 4 5 oz)        Temp Readings from Last 3 Encounters:   02/24/20 (!) 96 5 °F (35 8 °C) (Tympanic Core)   02/07/20 97 5 °F (36 4 °C) (Tympanic)   01/17/20 97 7 °F (36 5 °C) (Temporal)        BP Readings from Last 3 Encounters:   02/24/20 128/68   02/07/20 146/90   01/17/20 169/92         Pulse Readings from Last 3 Encounters:   02/24/20 (!) 112   01/17/20 93   01/13/20 88     @LASTSAO2(3)@

## 2020-02-25 RX ORDER — SODIUM CHLORIDE 9 MG/ML
20 INJECTION, SOLUTION INTRAVENOUS ONCE
Status: CANCELLED | OUTPATIENT
Start: 2020-02-27

## 2020-02-27 ENCOUNTER — HOSPITAL ENCOUNTER (OUTPATIENT)
Dept: INFUSION CENTER | Facility: CLINIC | Age: 65
Discharge: HOME | End: 2020-02-27
Payer: COMMERCIAL

## 2020-02-27 DIAGNOSIS — T45.1X5A CHEMOTHERAPY INDUCED NEUTROPENIA (HCC): Primary | ICD-10-CM

## 2020-02-27 DIAGNOSIS — Z17.0 MALIGNANT NEOPLASM OF LOWER-OUTER QUADRANT OF LEFT BREAST OF FEMALE, ESTROGEN RECEPTOR POSITIVE (HCC): ICD-10-CM

## 2020-02-27 DIAGNOSIS — C50.512 MALIGNANT NEOPLASM OF LOWER-OUTER QUADRANT OF LEFT BREAST OF FEMALE, ESTROGEN RECEPTOR POSITIVE (HCC): ICD-10-CM

## 2020-02-27 DIAGNOSIS — Z95.828 PORT-A-CATH IN PLACE: ICD-10-CM

## 2020-02-27 DIAGNOSIS — D70.1 CHEMOTHERAPY INDUCED NEUTROPENIA (HCC): Primary | ICD-10-CM

## 2020-02-27 LAB
ALBUMIN SERPL BCP-MCNC: 3.2 G/DL (ref 3.5–5.7)
ALP SERPL-CCNC: 70 U/L (ref 46–116)
ALT SERPL W P-5'-P-CCNC: 39 U/L (ref 12–78)
ANION GAP SERPL CALCULATED.3IONS-SCNC: 10 MMOL/L (ref 4–13)
AST SERPL W P-5'-P-CCNC: 33 U/L (ref 5–45)
BASOPHILS # BLD AUTO: 0.03 THOUSANDS/ΜL (ref 0–0.1)
BASOPHILS NFR BLD AUTO: 0 % (ref 0–1)
BILIRUB SERPL-MCNC: 0.7 MG/DL (ref 0.2–1)
BUN SERPL-MCNC: 11 MG/DL (ref 5–25)
CALCIUM SERPL-MCNC: 9.3 MG/DL (ref 8.3–10.1)
CHLORIDE SERPL-SCNC: 107 MMOL/L (ref 98–108)
CO2 SERPL-SCNC: 26 MMOL/L (ref 21–32)
CREAT SERPL-MCNC: 0.5 MG/DL (ref 0.6–1.3)
EOSINOPHIL # BLD AUTO: 0.01 THOUSAND/ΜL (ref 0–0.61)
EOSINOPHIL NFR BLD AUTO: 0 % (ref 0–6)
ERYTHROCYTE [DISTWIDTH] IN BLOOD BY AUTOMATED COUNT: 18 % (ref 11.6–15.1)
GFR SERPL CREATININE-BSD FRML MDRD: 103 ML/MIN/1.73SQ M
GLUCOSE SERPL-MCNC: 97 MG/DL (ref 65–140)
HCT VFR BLD AUTO: 33.9 % (ref 34.8–46.1)
HGB BLD-MCNC: 10.7 G/DL (ref 11.5–15.4)
LYMPHOCYTES # BLD AUTO: 1.38 THOUSANDS/ΜL (ref 0.6–4.47)
LYMPHOCYTES NFR BLD AUTO: 18 % (ref 14–44)
MCH RBC QN AUTO: 33.6 PG (ref 26.8–34.3)
MCHC RBC AUTO-ENTMCNC: 31.6 G/DL (ref 31.4–37.4)
MCV RBC AUTO: 107 FL (ref 82–98)
MONOCYTES # BLD AUTO: 0.85 THOUSAND/ΜL (ref 0.17–1.22)
MONOCYTES NFR BLD AUTO: 11 % (ref 4–12)
NEUTROPHILS # BLD AUTO: 5.39 THOUSANDS/ΜL (ref 1.85–7.62)
NEUTS SEG NFR BLD AUTO: 71 % (ref 43–75)
PLATELET # BLD AUTO: 148 THOUSANDS/UL (ref 149–390)
PMV BLD AUTO: 9.2 FL (ref 8.9–12.7)
POTASSIUM SERPL-SCNC: 3.6 MMOL/L (ref 3.5–5.3)
PROT SERPL-MCNC: 6.1 G/DL (ref 6.4–8.2)
RBC # BLD AUTO: 3.18 MILLION/UL (ref 3.81–5.12)
SODIUM SERPL-SCNC: 143 MMOL/L (ref 136–145)
WBC # BLD AUTO: 7.66 THOUSAND/UL (ref 4.31–10.16)

## 2020-02-27 PROCEDURE — 80053 COMPREHEN METABOLIC PANEL: CPT | Performed by: INTERNAL MEDICINE

## 2020-02-27 PROCEDURE — 85025 COMPLETE CBC W/AUTO DIFF WBC: CPT | Performed by: INTERNAL MEDICINE

## 2020-02-27 NOTE — PROGRESS NOTES
Pt without complaint, PAC flushed per protocol, labs drawn for treatment tomorrow, pt tolerated well

## 2020-02-27 NOTE — PLAN OF CARE
Problem: Potential for Falls  Goal: Patient will remain free of falls  Description  INTERVENTIONS:  - Assess patient frequently for physical needs  -  Identify cognitive and physical deficits and behaviors that affect risk of falls    -  [place] fall precautions as indicated by assessment   - Educate patient/family on patient safety including physical limitations  - Instruct patient to call for assistance with activity based on assessment  - Modify environment to reduce risk of injury  - Consider OT/PT consult to assist with strengthening/mobility  Outcome: Progressing

## 2020-02-27 NOTE — SOCIAL WORK
MSW met with pt prior to lab appt in the infusion center  Pt presented to be in good spirits and is excited to have only 2 more appointments, having labs today and treatment tomorrow  Pt was appreciative that MSW remembered her  Pt has radiation consult next week with Dr Lexie Ledesma  MSW will not be in the office on the day of this visit so follow up will be by phone and future appointments as needed

## 2020-02-28 ENCOUNTER — HOSPITAL ENCOUNTER (OUTPATIENT)
Dept: INFUSION CENTER | Facility: CLINIC | Age: 65
Discharge: HOME | End: 2020-02-28
Payer: COMMERCIAL

## 2020-02-28 VITALS
TEMPERATURE: 98.5 F | RESPIRATION RATE: 16 BRPM | SYSTOLIC BLOOD PRESSURE: 139 MMHG | BODY MASS INDEX: 32.88 KG/M2 | HEART RATE: 99 BPM | WEIGHT: 174.16 LBS | HEIGHT: 61 IN | DIASTOLIC BLOOD PRESSURE: 85 MMHG

## 2020-02-28 DIAGNOSIS — Z17.0 MALIGNANT NEOPLASM OF LOWER-OUTER QUADRANT OF LEFT BREAST OF FEMALE, ESTROGEN RECEPTOR POSITIVE (HCC): ICD-10-CM

## 2020-02-28 DIAGNOSIS — D70.1 CHEMOTHERAPY INDUCED NEUTROPENIA (HCC): Primary | ICD-10-CM

## 2020-02-28 DIAGNOSIS — C50.512 MALIGNANT NEOPLASM OF LOWER-OUTER QUADRANT OF LEFT BREAST OF FEMALE, ESTROGEN RECEPTOR POSITIVE (HCC): ICD-10-CM

## 2020-02-28 DIAGNOSIS — T45.1X5A CHEMOTHERAPY INDUCED NEUTROPENIA (HCC): Primary | ICD-10-CM

## 2020-02-28 PROCEDURE — 96413 CHEMO IV INFUSION 1 HR: CPT

## 2020-02-28 PROCEDURE — 96377 APPLICATON ON-BODY INJECTOR: CPT

## 2020-02-28 PROCEDURE — 96417 CHEMO IV INFUS EACH ADDL SEQ: CPT

## 2020-02-28 PROCEDURE — 96367 TX/PROPH/DG ADDL SEQ IV INF: CPT

## 2020-02-28 RX ORDER — SODIUM CHLORIDE 9 MG/ML
20 INJECTION, SOLUTION INTRAVENOUS ONCE
Status: COMPLETED | OUTPATIENT
Start: 2020-02-28 | End: 2020-02-28

## 2020-02-28 RX ADMIN — DOCETAXEL 132.8 MG: 20 INJECTION, SOLUTION, CONCENTRATE INTRAVENOUS at 10:41

## 2020-02-28 RX ADMIN — DEXAMETHASONE SODIUM PHOSPHATE: 10 INJECTION, SOLUTION INTRAMUSCULAR; INTRAVENOUS at 09:28

## 2020-02-28 RX ADMIN — SODIUM CHLORIDE 150 MG: 0.9 INJECTION, SOLUTION INTRAVENOUS at 09:53

## 2020-02-28 RX ADMIN — TRASTUZUMAB 450 MG: 150 INJECTION, POWDER, LYOPHILIZED, FOR SOLUTION INTRAVENOUS at 12:53

## 2020-02-28 RX ADMIN — CARBOPLATIN 616.8 MG: 10 INJECTION, SOLUTION INTRAVENOUS at 11:46

## 2020-02-28 RX ADMIN — PEGFILGRASTIM 6 MG: KIT SUBCUTANEOUS at 13:35

## 2020-02-28 RX ADMIN — SODIUM CHLORIDE 20 ML/HR: 0.9 INJECTION, SOLUTION INTRAVENOUS at 09:24

## 2020-02-28 NOTE — PROGRESS NOTES
Patient arrived on unit for chemotherapy  Denies any discomforts  Cleared for chemotherapy as per lab parameters on orders  Tolerated treatment today  Neulasta onpro applied to RA as per order  Patient aware of when neulasta is to infuse tomorrow  Denies any questions  Aware of next appointment   Declined AVS

## 2020-03-04 ENCOUNTER — TELEPHONE (OUTPATIENT)
Dept: HEMATOLOGY ONCOLOGY | Facility: CLINIC | Age: 65
End: 2020-03-04

## 2020-03-04 NOTE — TELEPHONE ENCOUNTER
Telephone Triage-Symptom Call      Miguelito Small  1955  [phone]      Caller:  Patient   Chief Complaint:  Fever 99 7   Current Treatment patient:  Yes  Name of treatment:  Chemo  DOCEtaxel + CARBOplatin + Trastuzumab) Q21 days  Date of last treatment: 2/28/2020  Recent illness or Surgery:   Denies       Description of symptoms per ONS Guidelines review (charting by exception):  States she had treatment on Friday 2/28  Temp today 99 7 low grade  Took 2 tylenol (500mg) about an hour ago  Drinking liberal fluids and food  Good urine out put  Denies any cough has bad post nasal drip in the past    Declined need to go to the ER at this time  Will rest and increase fluids  Take her temp throughout the night and tylenol as per bottle instruction  Guidelines followed: Yes    Disposition: Home Care Instructions, reviewed taking tylenol and monitoring of temp  Encouraged fluids and instructed to seek care if any worsening of her symptoms  She verbalized understanding of same  NEXT STEPS REQUIRED:  Patient is aware the office is closed at this time  Instructed to seek care or call on call physician if any changes  Office will follow up with her in the morning for update on her status       Patient verbalizes understanding and is in agreement with plan: YES    Verified that guidelines were completed and documented: YES

## 2020-03-05 NOTE — TELEPHONE ENCOUNTER
Left VM for patient  She should continue with the tylenol, fluids, and rest  If she has a temperature, I asked that she contact our office  Since it is low grade, the recommendation would be tylenol for now  Patient does not need to be evaluated in the ER  Left call back number if she has any questions

## 2020-03-06 ENCOUNTER — TELEPHONE (OUTPATIENT)
Dept: LABOR AND DELIVERY | Facility: HOSPITAL | Age: 65
End: 2020-03-06

## 2020-03-06 NOTE — TELEPHONE ENCOUNTER
Called Parul to [employer] after sending My Chart message, no response  Left message on voice message stating would like to touch base  Inquired whether she had given thought to follow up with us in Gynecology since our appointment in January was cancelled  I would like her to make appointment to discuss ultrasound findings, steps moving forward  Advised her that I believe we need to pursue sampling of the cervix to rule out malignancy or other abnormalities  Gave her our number to the office  Routing to pool for certified letter to be sent if no response    Thank you! -AMM

## 2020-03-09 ENCOUNTER — TELEPHONE (OUTPATIENT)
Dept: OBGYN CLINIC | Facility: CLINIC | Age: 65
End: 2020-03-09

## 2020-03-09 NOTE — TELEPHONE ENCOUNTER
----- Message from [address]  [place] sent at 3/9/2020  1:41 PM EDT -----  Regarding: RE:checking in  Contact: [id number] Becky Betts,   I received your message and as I have explained in January, I have all intention to pursue the finding  At this moment, I am still undergoing chemo then radiation and cannot inflict any more bx or treatMent until my body has a little rest  I would like to touch base perhaps the beginning of summer  Ps  I have no voice for a few days, cold on top of chemo  Ramírez, George Dhaliwal  ----- Message -----  From: Malcom Conte MD  Sent: 2/12/2020  8:15 PM EST  To: [place]  Subject: checking in  [facility]! I just wanted to touch base and see how things are going with your treatments  I hope the new year is treating you well so far! I also wanted to check in and see if you had given thought to following up with us here in Gynecology  I saw that our appointment unfortunately had to be canceled in January  I would very much like to follow up with you regarding the findings on your ultrasound and steps moving forward  I do believe we need to pursue sampling of the cervix to rule out malignancy or other abnormalities  Thank you and have a good rest of your week!   Dr Darlene Grant

## 2020-03-10 ENCOUNTER — TELEPHONE (OUTPATIENT)
Dept: INFUSION CENTER | Facility: HOSPITAL | Age: 65
End: 2020-03-10

## 2020-03-10 ENCOUNTER — TELEPHONE (OUTPATIENT)
Dept: HEMATOLOGY ONCOLOGY | Facility: CLINIC | Age: 65
End: 2020-03-10

## 2020-03-10 NOTE — TELEPHONE ENCOUNTER
Patient has lost voice and has tried OTC cough medicine and is taking Mucinex DM, patient does not have a fever  She does not want to go to PCP or urgent care  She would like Dr Kellie Mobley to call in a "something for her cough"  Please advise

## 2020-03-10 NOTE — TELEPHONE ENCOUNTER
Patient needs to contact her PCP in regards to this  Dr Vanessa Walsh recommends OTC cough medication that patient is already taking  This is not a side effect to her chemotherapy treatment, so we can not manage it

## 2020-03-10 NOTE — TELEPHONE ENCOUNTER
Patient partner Amanda Huron called stating that Mony Just has had a bad cough that has caused her to lose her voice  She stated that she would like to know if she can be prescribed cough medicine to help  They have not reached out to family doctor  [place] call back [phone]

## 2020-03-11 ENCOUNTER — RADIATION ONCOLOGY CONSULT (OUTPATIENT)
Dept: RADIATION ONCOLOGY | Facility: CLINIC | Age: 65
End: 2020-03-11
Attending: RADIOLOGY
Payer: COMMERCIAL

## 2020-03-11 ENCOUNTER — HOSPITAL ENCOUNTER (OUTPATIENT)
Dept: NON INVASIVE DIAGNOSTICS | Facility: HOSPITAL | Age: 65
Discharge: HOME | End: 2020-03-11
Attending: INTERNAL MEDICINE
Payer: COMMERCIAL

## 2020-03-11 VITALS
TEMPERATURE: 98.9 F | BODY MASS INDEX: 32.61 KG/M2 | SYSTOLIC BLOOD PRESSURE: 142 MMHG | RESPIRATION RATE: 16 BRPM | HEART RATE: 106 BPM | OXYGEN SATURATION: 97 % | HEIGHT: 61 IN | WEIGHT: 172.7 LBS | DIASTOLIC BLOOD PRESSURE: 72 MMHG

## 2020-03-11 DIAGNOSIS — T45.1X5A CHEMOTHERAPY INDUCED NEUTROPENIA (HCC): ICD-10-CM

## 2020-03-11 DIAGNOSIS — D70.1 CHEMOTHERAPY INDUCED NEUTROPENIA (HCC): ICD-10-CM

## 2020-03-11 DIAGNOSIS — Z17.0 MALIGNANT NEOPLASM OF LOWER-OUTER QUADRANT OF LEFT BREAST OF FEMALE, ESTROGEN RECEPTOR POSITIVE (HCC): Primary | ICD-10-CM

## 2020-03-11 DIAGNOSIS — C50.512 MALIGNANT NEOPLASM OF LOWER-OUTER QUADRANT OF LEFT BREAST OF FEMALE, ESTROGEN RECEPTOR POSITIVE (HCC): ICD-10-CM

## 2020-03-11 DIAGNOSIS — Z17.0 MALIGNANT NEOPLASM OF LOWER-OUTER QUADRANT OF LEFT BREAST OF FEMALE, ESTROGEN RECEPTOR POSITIVE (HCC): ICD-10-CM

## 2020-03-11 DIAGNOSIS — C50.512 MALIGNANT NEOPLASM OF LOWER-OUTER QUADRANT OF LEFT BREAST OF FEMALE, ESTROGEN RECEPTOR POSITIVE (HCC): Primary | ICD-10-CM

## 2020-03-11 PROCEDURE — 93306 TTE W/DOPPLER COMPLETE: CPT

## 2020-03-11 PROCEDURE — 99211 OFF/OP EST MAY X REQ PHY/QHP: CPT | Performed by: RADIOLOGY

## 2020-03-11 PROCEDURE — 93306 TTE W/DOPPLER COMPLETE: CPT | Performed by: INTERNAL MEDICINE

## 2020-03-11 NOTE — PROGRESS NOTES
Consultation - Radiation Oncology     FAF:[id number] : 1955  Encounter: [id number]  Patient Information: [address]  Chief Complaint   Patient presents with    Consult     radiation oncology     Cancer Staging  Malignant neoplasm of lower-outer quadrant of left breast of female, estrogen receptor positive ([facility] [address] )  Staging form: Breast, AJCC 8th Edition  - Pathologic: Stage IIA (pT2, pN0(sn), cM0, G3, ER+, [place]-, HER2+) - Unsigned  Neoadjuvant therapy: No  Laterality: Left  Tumor size (mm): 22  Method of lymph node assessment: [place] lymph node biopsy  Histologic grading system: 3 grade system           History of Present Illness   Jose Luis Lau is a 62y o  year old female who presents for consult for adjuvant therapy for  her left breast cancer, following lumpectomy, sentinel lymph node biopsy,  and chemotherapy       She was seen for consult by Dr Goss,10/3/19  She had palpated a lump in the lower left breast, and underwent mammogram, and bx  Her left breast u/s guided bx @ 5:00 position, 7 cm FTN, was + for Invasive mammary carcinoma of no special type   ER+, NV-, Her 2 +  She was offered genetic counseling  She had negative genetic testing A ct scan was done, because of LFT's were elevated  Her statin drugs were being reduced, by pcp       10/29/19, she underwent a left breast lumpectomy and SLN bx  Tumor size was 22 mm  Multiple foci of invasive carcinoma      Number of Foci:    4 Margins were negative ,closest was 3 mm  0/1 SLN      19 had gyn consult to establish care with Dr Jorge Colin  Pelvic exam revealed: Cervix with bulky soft mass, obscuring face of cervix, Multiloculated, friable   Appears to be protruding through the cervical os   Cervix itself small, mobile, no fullness of posterior culdesac   Pap was negative      U/S was done: The cervix shows complex 1 4 x 1 4 x 0 6 cm structure in addition to complex endocervical 0 8 cm fluid pocket   Direct cervical visualization recommended to exclude neoplasm/mass as suspected, gynecologic consult      Post op chemo was recommended  She met with Dr Gabby Kirkpatrick, He recommended  Chemo with  [facility]  Chemo was initiated 19  She was tolerating chemo fairly well  Adjuvant chemotherapy to be completed 2020        She is being referred by Dr Gabby Kirkpatrick for adjuvant left breast radiation       Patient was called by the gyn office several times, to f/u with abnormal findings on the 19 pelvic u/s  Dr Ivania Hutchinson believes she needs sampling of the cervix to rule out malignancy  Patient responded that she plans to follow-up in early summer, once she has completed treatment for her breast cancer      Patient has had cough and hoarse voice since last week, and reports feeling feverish at the end of last week  She saw her pcp yesterday and is now on Amoxicillin  She reports generalized fatigue from her chemotherapy   She has had no nausea and no vomiting and her weight has been stable  She previously smoked cigarettes but quit 10 years ago  She went through menopause at the age of 55  She denies any vaginal discharge nor any vaginal bleeding  She has no pelvic pains or cramps  She is  and seen with her spouse who is a cytopathologist here at [facility]   We answered all their questions today regarding radiation treatments  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  She has been out on a medical leave of absence since 2019 in order to receive treatments  Her mother  of Alzheimer's disease at the age of 80  Her father  of prostate carcinoma at the age of 80  She has 1 brother 1 sister who are both healthy  She has no children        Historical Information      Malignant neoplasm of lower-outer quadrant of left breast of female, estrogen receptor positive ([facility] [address] )    2019 Biopsy     Left breast ultrasound-guided biospy  A  5 o'clock, 7 cm from nipple  Invasive mammary carcinoma of no special type (ductal, not otherwise specified)  Grade 1  ER 70  NC < 1  HER2 3+    B  1 o'clock, 5 cm from nipple  Benign, fibrocystic changes without atypia  Involving intraductal micropapilloma  No evidence of malignancy    Right breast stereotactic biopsy  C  Right breast without calcifications  Benign, fibrocystic changes without atypia  No evidence of malignancy    Concordant  Unifocal; measures 2 cm on US  Left axilla negative  Right breast clear  Carcinoma with butterfly shaped clip       10/10/2019 Genetic Testing     The following genes were evaluated: MOI, BRCA1, BRCA2, CDH1, CHEK2, PALB2, PTEN, STK11, TP53  Negative result   No pathogenic sequence variants or deletions/dupllications identified  [employer]      10/29/2019 Surgery     Left breast needle localized lumpectomy with sentinel lymph node biopsy  Invasive mammary carcinoma of no special type (ductal, not otherwise specified)  Grade 3  2 2 cm  Margins negative  0/1 Lymph Nodes  Anatomic/Prognostic Stage IIA      12/6/2019 -  Chemotherapy     pegfilgrastim (NEULASTA) subcutaneous injection 6 mg, 6 mg, Subcutaneous, Once, 1 of 1 cycle  Administration: 6 mg (12/30/2019)  pegfilgrastim (NEULASTA ONPRO) subcutaneous injection kit 6 mg, 6 mg, Subcutaneous, Once, 5 of 6 cycles  Administration: 6 mg (12/6/2019), 6 mg (12/27/2019), 6 mg (1/17/2020), 6 mg (2/7/2020), 6 mg (2/28/2020)  fosaprepitant (EMEND) 150 mg in sodium chloride 0 9 % 255 mL IVPB, 150 mg, Intravenous, Once, 5 of 6 cycles  Administration: 150 mg (12/6/2019), 150 mg (12/27/2019), 150 mg (1/17/2020), 150 mg (2/7/2020), 150 mg (2/28/2020)  CARBOplatin (PARAPLATIN) 616 8 mg in sodium chloride 0 9 % 250 mL IVPB, 616 8 mg, Intravenous, Once, 5 of 6 cycles  Administration: 616 8 mg (12/6/2019), 616 8 mg (12/27/2019), 616 8 mg (1/17/2020), 616 8 mg (2/7/2020), 616 8 mg (2/28/2020)  DOCEtaxel (TAXOTERE) 106 2 mg in sodium chloride 0 9 % 250 mL chemo infusion, 60 mg/m2 = 106 2 mg (80 % of original dose 75 mg/m2), Intravenous, Once, 5 of 6 cycles  Dose modification: 60 mg/m2 (original dose 75 mg/m2, Cycle 1, Reason: Dose Not Tolerated), 75 mg/m2 (original dose 75 mg/m2, Cycle 2, Reason: Other (See Comments), Comment: LFT normalized  )  Administration: 106 2 mg (12/6/2019), 132 8 mg (12/27/2019), 132 8 mg (1/17/2020), 132 8 mg (2/7/2020), 132 8 mg (2/28/2020)  trastuzumab (HERCEPTIN) 600 mg in sodium chloride 0 9 % 250 mL chemo infusion, 624 mg, Intravenous, Once, 5 of 18 cycles  Administration: 600 mg (12/6/2019), 450 mg (12/27/2019), 450 mg (1/17/2020), 450 mg (2/7/2020), 450 mg (2/28/2020)           Past Medical History:   Diagnosis Date    Breast cancer ([facility] [address] )     grade 3 ER Positive NV negative Her 2 3+ disease    Hyperlipidemia     Hypertension     Vertigo     no medication     Past Surgical History:   Procedure Laterality Date    BREAST BIOPSY Left 09/17/2019    BREAST LUMPECTOMY Left 10/29/2019    Procedure: BREAST NEEDLE LOCALIZED LUMPECTOMY (NEEDLE LOC AT 0800); Surgeon: Yulia Bailey MD;  [facility]: [facility];  Service: Surgical Oncology    FRACTURE SURGERY Left     surgery repair- elbow     IR PORT PLACEMENT  12/3/2019    LYMPH NODE BIOPSY Left 10/29/2019    Procedure: SENTINEL LYMPH NODE BIOPSY; LYMPHATIC MAPPING WITH BLUE DYE AND RADIOACTIVE DYE (INJECT AT 0900 BY DR PALACIOS IN THE OR);   Surgeon: Yulia Bailey MD;  [facility];  Service: Surgical Oncology    MAMMO NEEDLE LOCALIZATION LEFT (ALL INC) Left 10/29/2019    MAMMO STEREOTACTIC BREAST BIOPSY RIGHT (ALL INC) Right 9/17/2019    US GUIDANCE BREAST BIOPSY LEFT EACH ADDITIONAL Left 9/17/2019    US GUIDED BREAST BIOPSY LEFT COMPLETE Left 9/17/2019    WISDOM TOOTH EXTRACTION Bilateral        Family History   Problem Relation Age of Onset    Prostate cancer Father 79    Arthritis Father     No Known Problems Sister     No Known Problems Paternal Aunt     Nail disease Paternal Aunt     No Known Problems Paternal Aunt        Social History   Social History     Substance and Sexual Activity   Alcohol Use Yes    Frequency: 2-3 times a week    Drinks per session: 3 or 4     Social History     Substance and Sexual Activity   Drug Use Never     Social History     Tobacco Use   Smoking Status Former Smoker    Packs/day: 0 75    Years: 38 00    Pack years: 28 50    Last attempt to quit: 2010    Years since quitting: 10 1   Smokeless Tobacco Never Used     Meds/Allergies     Current Outpatient Medications:     AMOXICILLIN PO, Take by mouth 3 (three) times a day For sinus infection, Disp: , Rfl:     Cholecalciferol (VITAMIN D3) 1000 units CAPS, Take 1,000 capsules by mouth daily in the early morning , Disp: , Rfl:     cyanocobalamin (VITAMIN B-12) 100 mcg tablet, Take 100 mcg by mouth daily after breakfast , Disp: , Rfl:     lidocaine-prilocaine (EMLA) cream, Apply topically as needed for mild pain, Disp: 30 g, Rfl: 1    Multiple Vitamins-Minerals (CENTRUM SILVER 50+WOMEN PO), Take 1 tablet by mouth daily in the early morning , Disp: , Rfl:     olmesartan (BENICAR) 20 mg tablet, Take 40 mg by mouth daily in the early morning , Disp: , Rfl: 2    ondansetron (ZOFRAN) 4 mg tablet, Take 1 tablet (4 mg total) by mouth every 8 (eight) hours as needed for nausea or vomiting, Disp: 30 tablet, Rfl: 1    oxyCODONE-acetaminophen (PERCOCET) 5-325 mg per tablet, Take 1 tablet by mouth every 6 (six) hours as needed for moderate painMax Daily Amount: 4 tablets, Disp: 6 tablet, Rfl: 0    rosuvastatin (CRESTOR) 5 mg tablet, Take 5 mg by mouth once a week , Disp: , Rfl: 3    CRANIAL PROSTHESIS, RX,, one wig as needed, Disp: 1 Piece, Rfl: 0  Allergies   Allergen Reactions    Adhesive [Medical Tape]      Review of Systems  Constitutional: Positive for appetite change (decreased), chills (last week, sinus infection), fatigue (10/10), fever (last week, sinus infection) and unexpected weight change (3 lbs)     HENT: Positive for ear pain (reports left ear clogged) and voice change (hoarse voice since last week)  Eyes: Positive for discharge (eyes water from chemo)  Respiratory: Positive for cough (since last week)  Cardiovascular: Negative  Gastrointestinal: Negative  Endocrine: Negative  Genitourinary: Negative  Musculoskeletal: Negative  Skin: Negative  Allergic/Immunologic: Negative  Neurological: Negative  Hematological: Negative  Psychiatric/Behavioral: The patient is nervous/anxious (d/t diagnosis)  OBJECTIVE:   /72   Pulse (!) 106   Temp 98 9 °F (37 2 °C)   Resp 16   Ht 5' 0 59" (1 539 m)   Wt 78 3 kg (172 lb 11 2 oz)   SpO2 97%   BMI 33 07 kg/m²   Pain Assessment:  0  Performance Status: ECOG/Zubrod/WHO: 2 - Symptomatic, <50% confined to bed    Physical Exam   Constitutional: She is oriented to person, place, and time  She appears well-developed and well-nourished  No distress  HENT:   Head: Normocephalic and atraumatic  Mouth/Throat: Oropharynx is clear and moist  No oropharyngeal exudate  Eyes: Pupils are equal, round, and reactive to light  Conjunctivae and EOM are normal  No scleral icterus  Neck: Normal range of motion  Neck supple  No tracheal deviation present  No thyromegaly present  Cardiovascular: Normal rate, regular rhythm and normal heart sounds  Pulmonary/Chest: Effort normal and breath sounds normal  No respiratory distress  She has no wheezes  She has no rales  She exhibits no tenderness  Right breast exhibits no inverted nipple, no mass, no nipple discharge, no skin change and no tenderness  Left breast exhibits no inverted nipple, no mass, no nipple discharge and no skin change ( There is a well-healed left breast lumpectomy incision with underlying postsurgical changes and no masses  )  Abdominal: Soft  Bowel sounds are normal  She exhibits no distension and no mass  There is no tenderness  Musculoskeletal: Normal range of motion   She exhibits no edema or tenderness  Lymphadenopathy:     She has no cervical adenopathy  She has no axillary adenopathy  Right: No supraclavicular adenopathy present  Left: No supraclavicular adenopathy present  Neurological: She is alert and oriented to person, place, and time  No cranial nerve deficit  Coordination normal    Skin: Skin is warm and dry  No rash noted  She is not diaphoretic  No erythema  No pallor  Psychiatric: She has a normal mood and affect  Her behavior is normal  Judgment and thought content normal    Nursing note and vitals reviewed  RESULTS  Lab Results    Chemistry        Component Value Date/Time    K 3 6 02/27/2020 1234     02/27/2020 1234    CO2 26 02/27/2020 1234    BUN 11 02/27/2020 1234    CREATININE 0 50 (L) 02/27/2020 1234        Component Value Date/Time    CALCIUM 9 3 02/27/2020 1234    ALKPHOS 70 02/27/2020 1234    AST 33 02/27/2020 1234    ALT 39 02/27/2020 1234            Lab Results   Component Value Date    WBC 7 66 02/27/2020    HGB 10 7 (L) 02/27/2020    HCT 33 9 (L) 02/27/2020     (H) 02/27/2020     (L) 02/27/2020       Imaging Studies  No results found  Pathology: See Above    ASSESSMENT  1  Malignant neoplasm of lower-outer quadrant of left breast of female, estrogen receptor positive [facility])  Ambulatory referral to Radiation Oncology    Radiation Simulation Treatment   2   Chemotherapy induced neutropenia (HCC)  Ambulatory referral to Radiation Oncology     Cancer Staging  Malignant neoplasm of lower-outer quadrant of left breast of female, estrogen receptor positive ([facility] [address] )  Staging form: Breast, AJCC 8th Edition  - Pathologic: Stage IIA (pT2, pN0(sn), cM0, G3, ER+, [place]-, HER2+) - Unsigned  Neoadjuvant therapy: No  Laterality: Left  Tumor size (mm): 22  Method of lymph node assessment: [place] lymph node biopsy  Histologic grading system: 3 grade system        PLAN/DISCUSSION  Orders Placed This Encounter   Procedures    Radiation Simulation Treatment          Lauren Dale is a 62y o  year old female with a stage IIA, pT2, pN0, M0 grade 3, ER positive, [place] negative, HER2 positive invasive mammary carcinoma who is status post lumpectomy for a 2 2 cm primary with negative margins  There was 1 sentinel lymph node that was negative for any metastatic disease  She is negative for BRCA gene mutation  She was seen by Dr Haritha Mckenzie and is currently receiving adjuvant chemotherapy with [facility] that has resulted in progressive fatigue  She will complete [facility] chemotherapy on March 20, 2020  In order to complete her breast conservation management, she will require radiation therapy to the entire left breast   We discussed a conventional course of treatment over 6 weeks verses a hypofractionated course of treatment over 4 weeks  She will likely require a 6 week course of treatment, but this will depend on simulation and final treatment planning  We discussed the acute side effects and the potential chronic complications of post lumpectomy radiation therapy with her  We discussed the use of breath hold techniques to reduce dose to her heart as well as her left lung  She consents to proceed with the post lumpectomy radiation therapy in order to reduce her risk of local recurrence within the breast   She will need time to recover from chemotherapy and we will not plan to start radiation therapy until 1 month after she completes chemotherapy  She will return here for simulation and treatment planning purposes on April 3, 2020  She will then most likely require a 6 week course of treatment  Subsequently, she will need 4 weeks to recover from treatment  She will continue to remain off from work until she completes radiation therapy and fully recovers from treatment  After she completes treatment, then she will be starting on aromatase inhibitor    We also discussed that she will need to go back to her gynecologist to have further workup and biopsy of a cervical mass that was detected in November 2019  She currently denies any vaginal bleeding or pelvic symptoms  Lynne Rob MD  3/11/2020,1:55 PM      Portions of the record may have been created with voice recognition software   Occasional wrong word or "sound a like" substitutions may have occurred due to the inherent limitations of voice recognition software   Read the chart carefully and recognize, using context, where substitutions have occurred

## 2020-03-11 NOTE — PROGRESS NOTES
Indigo Thornton 1955 is a 62 y o  female       Pt is being seen for consult for  adjuvant therapy for  her left breast cancer, following lumpectomy, slnbx,  and chemotherapy  Pt was seen for consult by Dr Andrew Tate  She had palpated a lump in the lower left breast, and underwent mammogram, and bx  Her left breast u/s guided bx @ 5:00 position, 7 cm FTN, was + for Invasive mammary carcinoma of no special type   ER+, CO-, Her 2 +  She was offered genetic counseling  She had negative genetic testing A ct scan was done, because of LFT's were elevated  Her statin drugs were being reduced, by pcp  10/29/19, she underwent a left breast lumpectomy and SLN bx  Tumor size was 22 mm  Multiple foci of invasive carcinoma      Number of Foci:    4 Margins were negative ,closest was 3 mm  0/1 SLN     11/18/19 had gyn consult to establish care with Dr Carey Tobin  Pelvic exam revealed: Cervix with bulky soft mass, obscuring face of cervix, Multiloculated, friable  Appears to be protruding through the cervical os  Cervix itself small, mobile, no fullness of posterior culdesac  Pap was negative  U/S was done: The cervix shows complex 1 4 x 1 4 x 0 6 cm structure in addition to complex endocervical 0 8 cm fluid pocket  Direct cervical visualization recommended to exclude neoplasm/mass as suspected, gynecologic consult  Post op chemo was recommended  She met with Dr Wesly Baldwin, He recommended  Chemo with  [facility]  Chemo was initiated 12/6/19  She was tolerating chemo fairly well  Adjuvant chemotherapy to be completed March 20, 2020       She is being referred by Dr Wesly Baldwin for adjuvant left breast radiation  Pt was called by the gyn office several times, to f/u with abnormal findings on the 11/18/19 pelvic u/s  Dr Carey Tobin believes she needs sampling of the cervix to rule out malignancy  Patient responded that she plans to follow-up in early summer, once she has completed treatment for her breast cancer      Patient has had cough and hoarse voice since last week, and reports feeling feverish at the end of last week  She saw her pcp yesterday and is now on Amoxicillin  Malignant neoplasm of lower-outer quadrant of left breast of female, estrogen receptor positive ([facility] [address] )    9/17/2019 Biopsy     Left breast ultrasound-guided biospy  A  5 o'clock, 7 cm from nipple  Invasive mammary carcinoma of no special type (ductal, not otherwise specified)  Grade 1  ER 70  CT < 1  HER2 3+    B  1 o'clock, 5 cm from nipple  Benign, fibrocystic changes without atypia  Involving intraductal micropapilloma  No evidence of malignancy    Right breast stereotactic biopsy  C  Right breast without calcifications  Benign, fibrocystic changes without atypia  No evidence of malignancy    Concordant  Unifocal; measures 2 cm on US  Left axilla negative  Right breast clear  Carcinoma with butterfly shaped clip       10/10/2019 Genetic Testing     The following genes were evaluated: MOI, BRCA1, BRCA2, CDH1, CHEK2, PALB2, PTEN, STK11, TP53  Negative result   No pathogenic sequence variants or deletions/dupllications identified  [employer]      10/29/2019 Surgery     Left breast needle localized lumpectomy with sentinel lymph node biopsy  Invasive mammary carcinoma of no special type (ductal, not otherwise specified)  Grade 3  2 2 cm  Margins negative  0/1 Lymph Nodes  Anatomic/Prognostic Stage IIA      12/6/2019 -  Chemotherapy     pegfilgrastim (NEULASTA) subcutaneous injection 6 mg, 6 mg, Subcutaneous, Once, 1 of 1 cycle  Administration: 6 mg (12/30/2019)  pegfilgrastim (NEULASTA ONPRO) subcutaneous injection kit 6 mg, 6 mg, Subcutaneous, Once, 5 of 6 cycles  Administration: 6 mg (12/6/2019), 6 mg (12/27/2019), 6 mg (1/17/2020), 6 mg (2/7/2020), 6 mg (2/28/2020)  fosaprepitant (EMEND) 150 mg in sodium chloride 0 9 % 255 mL IVPB, 150 mg, Intravenous, Once, 5 of 6 cycles  Administration: 150 mg (12/6/2019), 150 mg (12/27/2019), 150 mg (1/17/2020), 150 mg (2020), 150 mg (2020)  CARBOplatin (PARAPLATIN) 616 8 mg in sodium chloride 0 9 % 250 mL IVPB, 616 8 mg, Intravenous, Once, 5 of 6 cycles  Administration: 616 8 mg (2019), 616 8 mg (2019), 616 8 mg (2020), 616 8 mg (2020), 616 8 mg (2020)  DOCEtaxel (TAXOTERE) 106 2 mg in sodium chloride 0 9 % 250 mL chemo infusion, 60 mg/m2 = 106 2 mg (80 % of original dose 75 mg/m2), Intravenous, Once, 5 of 6 cycles  Dose modification: 60 mg/m2 (original dose 75 mg/m2, Cycle 1, Reason: Dose Not Tolerated), 75 mg/m2 (original dose 75 mg/m2, Cycle 2, Reason: Other (See Comments), Comment: LFT normalized  )  Administration: 106 2 mg (2019), 132 8 mg (2019), 132 8 mg (2020), 132 8 mg (2020), 132 8 mg (2020)  trastuzumab (HERCEPTIN) 600 mg in sodium chloride 0 9 % 250 mL chemo infusion, 624 mg, Intravenous, Once, 5 of 18 cycles  Administration: 600 mg (2019), 450 mg (2019), 450 mg (2020), 450 mg (2020), 450 mg (2020)           Clinical Trial: no      OB/GYN History:  The patient underwent menarche at 14 years  Menopause Status post  No LMP recorded  Patient is postmenopausal   Menopause at 55 years    Menopause Reason natural  Hormone replacement therapy: no      0   Para 0   Birth control pills: no     Pregnancy test needed:  no      Health Maintenance   Topic Date Due    Hepatitis C Screening  1955    Depression Screening PHQ  1955    CRC Screening: Colonoscopy  1955    DTaP,Tdap,and Td Vaccines (1 - Tdap) 10/09/1967    HIV Screening  10/09/1971    BMI: Followup Plan  10/09/1974    Influenza Vaccine  2019    MAMMOGRAM  09/10/2020    Annual Physical  2020    BMI: Adult  2021    Pneumococcal Vaccine: 65+ Years (1 of 2 - PCV13) 10/09/2021    Cervical Cancer Screening  2024    Pneumococcal Vaccine: Pediatrics (0 to 5 Years) and At-Risk Patients (6 to 59 Years)  Aged Out    HIB Vaccine  Aged [place]  Hepatitis B Vaccine  Aged Out    IPV Vaccine  Aged Out    Hepatitis A Vaccine  Aged Out    Meningococcal ACWY Vaccine  Aged Out    HPV Vaccine  Aged Out       Past Medical History:   Diagnosis Date    Breast cancer ([address] )     grade 3 ER Positive AK negative Her 2 3+ disease    Hyperlipidemia     Hypertension     Vertigo     no medication       Past Surgical History:   Procedure Laterality Date    BREAST BIOPSY Left 09/17/2019    BREAST LUMPECTOMY Left 10/29/2019    Procedure: BREAST NEEDLE LOCALIZED LUMPECTOMY (NEEDLE LOC AT 0800); Surgeon: Terese Mario MD;  [facility];  Service: Surgical Oncology    FRACTURE SURGERY Left     surgery repair- elbow     IR PORT PLACEMENT  12/3/2019    LYMPH NODE BIOPSY Left 10/29/2019    Procedure: SENTINEL LYMPH NODE BIOPSY; LYMPHATIC MAPPING WITH BLUE DYE AND RADIOACTIVE DYE (INJECT AT 0900 BY DR PALACIOS IN THE OR);   Surgeon: Terese Mario MD;  [facility];  Service: Surgical Oncology    MAMMO NEEDLE LOCALIZATION LEFT (ALL INC) Left 10/29/2019    MAMMO STEREOTACTIC BREAST BIOPSY RIGHT (ALL INC) Right 9/17/2019    US GUIDANCE BREAST BIOPSY LEFT EACH ADDITIONAL Left 9/17/2019    US GUIDED BREAST BIOPSY LEFT COMPLETE Left 9/17/2019    WISDOM TOOTH EXTRACTION Bilateral        Family History   Problem Relation Age of Onset    Prostate cancer Father 79    Arthritis Father     No Known Problems Sister     No Known Problems Paternal Aunt     Nail disease Paternal Aunt     No Known Problems Paternal Aunt        Social History     Tobacco Use    Smoking status: Former Smoker     Packs/day: 0 75     Years: 38 00     Pack years: 28 50     Last attempt to quit: 2010     Years since quitting: 10 1    Smokeless tobacco: Never Used   Substance Use Topics    Alcohol use: Yes     Frequency: 2-3 times a week     Drinks per session: 3 or 4    Drug use: Never          Current Outpatient Medications:     AMOXICILLIN PO, Take by mouth 3 (three) times a day For sinus infection, Disp: , Rfl:     Cholecalciferol (VITAMIN D3) 1000 units CAPS, Take 1,000 capsules by mouth daily in the early morning , Disp: , Rfl:     cyanocobalamin (VITAMIN B-12) 100 mcg tablet, Take 100 mcg by mouth daily after breakfast , Disp: , Rfl:     lidocaine-prilocaine (EMLA) cream, Apply topically as needed for mild pain, Disp: 30 g, Rfl: 1    Multiple Vitamins-Minerals (CENTRUM SILVER 50+WOMEN PO), Take 1 tablet by mouth daily in the early morning , Disp: , Rfl:     olmesartan (BENICAR) 20 mg tablet, Take 40 mg by mouth daily in the early morning , Disp: , Rfl: 2    ondansetron (ZOFRAN) 4 mg tablet, Take 1 tablet (4 mg total) by mouth every 8 (eight) hours as needed for nausea or vomiting, Disp: 30 tablet, Rfl: 1    oxyCODONE-acetaminophen (PERCOCET) 5-325 mg per tablet, Take 1 tablet by mouth every 6 (six) hours as needed for moderate painMax Daily Amount: 4 tablets, Disp: 6 tablet, Rfl: 0    rosuvastatin (CRESTOR) 5 mg tablet, Take 5 mg by mouth once a week , Disp: , Rfl: 3    CRANIAL PROSTHESIS, RX,, one wig as needed, Disp: 1 Piece, Rfl: 0    Allergies   Allergen Reactions    Adhesive [Medical Tape]         Review of Systems:  Review of Systems   Constitutional: Positive for appetite change (decreased), chills (last week, sinus infection), fatigue (10/10), fever (last week, sinus infection) and unexpected weight change (3 lbs)  HENT: Positive for ear pain (reports left ear clogged) and voice change (hoarse voice since last week)  Eyes: Positive for discharge (eyes water from chemo)  Respiratory: Positive for cough (since last week)  Cardiovascular: Negative  Gastrointestinal: Negative  Endocrine: Negative  Genitourinary: Negative  Musculoskeletal: Negative  Skin: Negative  Allergic/Immunologic: Negative  Neurological: Negative  Hematological: Negative  Psychiatric/Behavioral: The patient is nervous/anxious (d/t diagnosis)          Vitals:    03/11/20 1239   BP: 142/72   Pulse: (!) 106   Resp: 16   Temp: 98 9 °F (37 2 °C)   SpO2: 97%   Weight: 78 3 kg (172 lb 11 2 oz)   Height: 5' 0 59" (1 539 m)       Pain Score: 0-No pain    Imaging:No images are attached to the encounter       Teaching [facility] RT packet given

## 2020-03-13 ENCOUNTER — TELEPHONE (OUTPATIENT)
Dept: LAB | Facility: CLINIC | Age: 65
End: 2020-03-13

## 2020-03-18 RX ORDER — SODIUM CHLORIDE 9 MG/ML
20 INJECTION, SOLUTION INTRAVENOUS ONCE
Status: CANCELLED | OUTPATIENT
Start: 2020-03-19

## 2020-03-19 ENCOUNTER — HOSPITAL ENCOUNTER (OUTPATIENT)
Dept: INFUSION CENTER | Facility: CLINIC | Age: 65
End: 2020-03-19

## 2020-03-20 ENCOUNTER — TRANSCRIBE ORDERS (OUTPATIENT)
Dept: INFUSION CENTER | Facility: CLINIC | Age: 65
End: 2020-03-20

## 2020-03-20 ENCOUNTER — APPOINTMENT (OUTPATIENT)
Dept: LAB | Facility: CLINIC | Age: 65
End: 2020-03-20
Payer: COMMERCIAL

## 2020-03-20 ENCOUNTER — HOSPITAL ENCOUNTER (OUTPATIENT)
Dept: INFUSION CENTER | Facility: CLINIC | Age: 65
Discharge: HOME | End: 2020-03-20
Payer: COMMERCIAL

## 2020-03-20 VITALS
TEMPERATURE: 98.1 F | DIASTOLIC BLOOD PRESSURE: 81 MMHG | SYSTOLIC BLOOD PRESSURE: 128 MMHG | BODY MASS INDEX: 33.15 KG/M2 | WEIGHT: 175.6 LBS | RESPIRATION RATE: 16 BRPM | HEART RATE: 103 BPM | HEIGHT: 61 IN

## 2020-03-20 DIAGNOSIS — E78.5 HYPERLIPIDEMIA, UNSPECIFIED HYPERLIPIDEMIA TYPE: ICD-10-CM

## 2020-03-20 DIAGNOSIS — E78.5 HYPERLIPIDEMIA, UNSPECIFIED HYPERLIPIDEMIA TYPE: Primary | ICD-10-CM

## 2020-03-20 DIAGNOSIS — C50.512 MALIGNANT NEOPLASM OF LOWER-OUTER QUADRANT OF LEFT BREAST OF FEMALE, ESTROGEN RECEPTOR POSITIVE (HCC): ICD-10-CM

## 2020-03-20 DIAGNOSIS — D70.1 CHEMOTHERAPY INDUCED NEUTROPENIA (HCC): Primary | ICD-10-CM

## 2020-03-20 DIAGNOSIS — Z17.0 MALIGNANT NEOPLASM OF LOWER-OUTER QUADRANT OF LEFT BREAST OF FEMALE, ESTROGEN RECEPTOR POSITIVE (HCC): ICD-10-CM

## 2020-03-20 DIAGNOSIS — T45.1X5A CHEMOTHERAPY INDUCED NEUTROPENIA (HCC): Primary | ICD-10-CM

## 2020-03-20 LAB
ALBUMIN SERPL BCP-MCNC: 3.1 G/DL (ref 3.5–5.7)
ALP SERPL-CCNC: 62 U/L (ref 46–116)
ALT SERPL W P-5'-P-CCNC: 25 U/L (ref 12–78)
ANION GAP SERPL CALCULATED.3IONS-SCNC: 13 MMOL/L (ref 4–13)
AST SERPL W P-5'-P-CCNC: 37 U/L (ref 5–45)
BASOPHILS # BLD AUTO: 0.02 THOUSANDS/ΜL (ref 0–0.1)
BASOPHILS NFR BLD AUTO: 0 % (ref 0–1)
BILIRUB SERPL-MCNC: 0.5 MG/DL (ref 0.2–1)
BUN SERPL-MCNC: 9 MG/DL (ref 5–25)
CALCIUM SERPL-MCNC: 9.1 MG/DL (ref 8.3–10.1)
CHLORIDE SERPL-SCNC: 106 MMOL/L (ref 98–108)
CHOLEST SERPL-MCNC: 174 MG/DL (ref 50–200)
CO2 SERPL-SCNC: 26 MMOL/L (ref 21–32)
CREAT SERPL-MCNC: 0.2 MG/DL (ref 0.6–1.3)
EOSINOPHIL # BLD AUTO: 0.01 THOUSAND/ΜL (ref 0–0.61)
EOSINOPHIL NFR BLD AUTO: 0 % (ref 0–6)
ERYTHROCYTE [DISTWIDTH] IN BLOOD BY AUTOMATED COUNT: 16.8 % (ref 11.6–15.1)
GFR SERPL CREATININE-BSD FRML MDRD: 140 ML/MIN/1.73SQ M
GLUCOSE P FAST SERPL-MCNC: 104 MG/DL (ref 65–99)
GLUCOSE SERPL-MCNC: 104 MG/DL (ref 65–140)
HCT VFR BLD AUTO: 35.6 % (ref 34.8–46.1)
HDLC SERPL-MCNC: 57 MG/DL
HGB BLD-MCNC: 11.4 G/DL (ref 11.5–15.4)
LDLC SERPL CALC-MCNC: 92 MG/DL (ref 0–100)
LYMPHOCYTES # BLD AUTO: 1.85 THOUSANDS/ΜL (ref 0.6–4.47)
LYMPHOCYTES NFR BLD AUTO: 23 % (ref 14–44)
MCH RBC QN AUTO: 34.9 PG (ref 26.8–34.3)
MCHC RBC AUTO-ENTMCNC: 32 G/DL (ref 31.4–37.4)
MCV RBC AUTO: 109 FL (ref 82–98)
MONOCYTES # BLD AUTO: 1.02 THOUSAND/ΜL (ref 0.17–1.22)
MONOCYTES NFR BLD AUTO: 13 % (ref 4–12)
NEUTROPHILS # BLD AUTO: 5.12 THOUSANDS/ΜL (ref 1.85–7.62)
NEUTS SEG NFR BLD AUTO: 64 % (ref 43–75)
NONHDLC SERPL-MCNC: 117 MG/DL
PLATELET # BLD AUTO: 239 THOUSANDS/UL (ref 149–390)
PMV BLD AUTO: 9.7 FL (ref 8.9–12.7)
POTASSIUM SERPL-SCNC: 3.7 MMOL/L (ref 3.5–5.3)
PROT SERPL-MCNC: 6.1 G/DL (ref 6.4–8.2)
RBC # BLD AUTO: 3.27 MILLION/UL (ref 3.81–5.12)
SODIUM SERPL-SCNC: 145 MMOL/L (ref 136–145)
TRIGL SERPL-MCNC: 126 MG/DL
WBC # BLD AUTO: 8.02 THOUSAND/UL (ref 4.31–10.16)

## 2020-03-20 PROCEDURE — 96417 CHEMO IV INFUS EACH ADDL SEQ: CPT

## 2020-03-20 PROCEDURE — 36415 COLL VENOUS BLD VENIPUNCTURE: CPT

## 2020-03-20 PROCEDURE — 96367 TX/PROPH/DG ADDL SEQ IV INF: CPT

## 2020-03-20 PROCEDURE — 96413 CHEMO IV INFUSION 1 HR: CPT

## 2020-03-20 PROCEDURE — 85025 COMPLETE CBC W/AUTO DIFF WBC: CPT | Performed by: INTERNAL MEDICINE

## 2020-03-20 PROCEDURE — 80053 COMPREHEN METABOLIC PANEL: CPT | Performed by: INTERNAL MEDICINE

## 2020-03-20 PROCEDURE — 96377 APPLICATON ON-BODY INJECTOR: CPT

## 2020-03-20 PROCEDURE — 80061 LIPID PANEL: CPT

## 2020-03-20 RX ORDER — SODIUM CHLORIDE 9 MG/ML
20 INJECTION, SOLUTION INTRAVENOUS ONCE
Status: COMPLETED | OUTPATIENT
Start: 2020-03-20 | End: 2020-03-20

## 2020-03-20 RX ADMIN — TRASTUZUMAB 450 MG: 150 INJECTION, POWDER, LYOPHILIZED, FOR SOLUTION INTRAVENOUS at 14:01

## 2020-03-20 RX ADMIN — DEXAMETHASONE SODIUM PHOSPHATE: 10 INJECTION, SOLUTION INTRAMUSCULAR; INTRAVENOUS at 10:37

## 2020-03-20 RX ADMIN — SODIUM CHLORIDE 150 MG: 0.9 INJECTION, SOLUTION INTRAVENOUS at 11:11

## 2020-03-20 RX ADMIN — SODIUM CHLORIDE 20 ML/HR: 0.9 INJECTION, SOLUTION INTRAVENOUS at 10:15

## 2020-03-20 RX ADMIN — CARBOPLATIN 616.8 MG: 10 INJECTION, SOLUTION INTRAVENOUS at 12:59

## 2020-03-20 RX ADMIN — PEGFILGRASTIM 6 MG: KIT SUBCUTANEOUS at 14:45

## 2020-03-20 RX ADMIN — DOCETAXEL 132.8 MG: 20 INJECTION, SOLUTION, CONCENTRATE INTRAVENOUS at 11:59

## 2020-03-20 NOTE — PROGRESS NOTES
Patient arrived on unit for treatment today  Patient finishing antibiotics for sinus infection  Denies any fever  CBC/CMP drawn as ordered  Cleared for chemotherapy as per lab parameters on orders  Patient's port ecchymotic, although appears to be healing  No swelling or pain  Patient stated the "cat may have jumped up and bumped it"  Patient stated at last physician appointment he noted the ecchymosis  Due to ecchymosis, chemotherapy administered via peripheral line  Patient understands and agrees  Tolerated chemotherapy today  Aware of next appointment for herceptin  AVS given to patient  Reinforced to patient to monitor port site and if any issues/concerns/pain to notify physician   Patient verbalized understanding

## 2020-04-03 ENCOUNTER — DOCUMENTATION (OUTPATIENT)
Dept: INFUSION CENTER | Facility: CLINIC | Age: 65
End: 2020-04-03

## 2020-04-03 ENCOUNTER — APPOINTMENT (OUTPATIENT)
Dept: RADIATION ONCOLOGY | Facility: CLINIC | Age: 65
End: 2020-04-03
Attending: RADIOLOGY
Payer: COMMERCIAL

## 2020-04-03 PROCEDURE — 77332 RADIATION TREATMENT AID(S): CPT | Performed by: RADIOLOGY

## 2020-04-03 PROCEDURE — 77290 THER RAD SIMULAJ FIELD CPLX: CPT | Performed by: RADIOLOGY

## 2020-04-10 ENCOUNTER — HOSPITAL ENCOUNTER (OUTPATIENT)
Dept: INFUSION CENTER | Facility: CLINIC | Age: 65
Discharge: HOME | End: 2020-04-10
Payer: COMMERCIAL

## 2020-04-10 VITALS
HEIGHT: 61 IN | TEMPERATURE: 99 F | WEIGHT: 178.13 LBS | DIASTOLIC BLOOD PRESSURE: 84 MMHG | HEART RATE: 112 BPM | BODY MASS INDEX: 33.63 KG/M2 | RESPIRATION RATE: 18 BRPM | SYSTOLIC BLOOD PRESSURE: 150 MMHG

## 2020-04-10 DIAGNOSIS — T45.1X5A CHEMOTHERAPY INDUCED NEUTROPENIA (HCC): Primary | ICD-10-CM

## 2020-04-10 DIAGNOSIS — Z17.0 MALIGNANT NEOPLASM OF LOWER-OUTER QUADRANT OF LEFT BREAST OF FEMALE, ESTROGEN RECEPTOR POSITIVE (HCC): ICD-10-CM

## 2020-04-10 DIAGNOSIS — D70.1 CHEMOTHERAPY INDUCED NEUTROPENIA (HCC): Primary | ICD-10-CM

## 2020-04-10 DIAGNOSIS — C50.512 MALIGNANT NEOPLASM OF LOWER-OUTER QUADRANT OF LEFT BREAST OF FEMALE, ESTROGEN RECEPTOR POSITIVE (HCC): ICD-10-CM

## 2020-04-10 PROCEDURE — 96413 CHEMO IV INFUSION 1 HR: CPT

## 2020-04-10 PROCEDURE — 77295 3-D RADIOTHERAPY PLAN: CPT | Performed by: RADIOLOGY

## 2020-04-10 PROCEDURE — 77300 RADIATION THERAPY DOSE PLAN: CPT | Performed by: RADIOLOGY

## 2020-04-10 PROCEDURE — 77334 RADIATION TREATMENT AID(S): CPT | Performed by: RADIOLOGY

## 2020-04-10 RX ORDER — SODIUM CHLORIDE 9 MG/ML
20 INJECTION, SOLUTION INTRAVENOUS ONCE
Status: COMPLETED | OUTPATIENT
Start: 2020-04-10 | End: 2020-04-10

## 2020-04-10 RX ADMIN — SODIUM CHLORIDE 20 ML/HR: 0.9 INJECTION, SOLUTION INTRAVENOUS at 09:00

## 2020-04-10 RX ADMIN — TRASTUZUMAB 450 MG: 150 INJECTION, POWDER, LYOPHILIZED, FOR SOLUTION INTRAVENOUS at 09:33

## 2020-04-27 ENCOUNTER — TELEPHONE (OUTPATIENT)
Dept: HEMATOLOGY ONCOLOGY | Facility: CLINIC | Age: 65
End: 2020-04-27

## 2020-05-01 ENCOUNTER — HOSPITAL ENCOUNTER (OUTPATIENT)
Dept: INFUSION CENTER | Facility: CLINIC | Age: 65
Discharge: HOME | End: 2020-05-01
Payer: COMMERCIAL

## 2020-05-01 VITALS
TEMPERATURE: 97.2 F | BODY MASS INDEX: 32.09 KG/M2 | DIASTOLIC BLOOD PRESSURE: 87 MMHG | HEART RATE: 98 BPM | RESPIRATION RATE: 18 BRPM | SYSTOLIC BLOOD PRESSURE: 156 MMHG | WEIGHT: 167.55 LBS

## 2020-05-01 DIAGNOSIS — C50.512 MALIGNANT NEOPLASM OF LOWER-OUTER QUADRANT OF LEFT BREAST OF FEMALE, ESTROGEN RECEPTOR POSITIVE (HCC): ICD-10-CM

## 2020-05-01 DIAGNOSIS — T45.1X5A CHEMOTHERAPY INDUCED NEUTROPENIA (HCC): Primary | ICD-10-CM

## 2020-05-01 DIAGNOSIS — Z17.0 MALIGNANT NEOPLASM OF LOWER-OUTER QUADRANT OF LEFT BREAST OF FEMALE, ESTROGEN RECEPTOR POSITIVE (HCC): ICD-10-CM

## 2020-05-01 DIAGNOSIS — C50.512 MALIGNANT NEOPLASM OF LOWER-OUTER QUADRANT OF LEFT FEMALE BREAST, UNSPECIFIED ESTROGEN RECEPTOR STATUS (HCC): Primary | ICD-10-CM

## 2020-05-01 DIAGNOSIS — D70.1 CHEMOTHERAPY INDUCED NEUTROPENIA (HCC): Primary | ICD-10-CM

## 2020-05-01 PROCEDURE — 96413 CHEMO IV INFUSION 1 HR: CPT

## 2020-05-01 RX ORDER — SODIUM CHLORIDE 9 MG/ML
20 INJECTION, SOLUTION INTRAVENOUS ONCE
Status: COMPLETED | OUTPATIENT
Start: 2020-05-01 | End: 2020-05-01

## 2020-05-01 RX ADMIN — TRASTUZUMAB 450 MG: 150 INJECTION, POWDER, LYOPHILIZED, FOR SOLUTION INTRAVENOUS at 09:24

## 2020-05-01 RX ADMIN — SODIUM CHLORIDE 20 ML/HR: 9 INJECTION, SOLUTION INTRAVENOUS at 09:24

## 2020-05-04 ENCOUNTER — APPOINTMENT (OUTPATIENT)
Dept: RADIATION ONCOLOGY | Facility: CLINIC | Age: 65
End: 2020-05-04
Attending: RADIOLOGY
Payer: COMMERCIAL

## 2020-05-05 ENCOUNTER — APPOINTMENT (OUTPATIENT)
Dept: RADIATION ONCOLOGY | Facility: CLINIC | Age: 65
End: 2020-05-05
Attending: RADIOLOGY
Payer: COMMERCIAL

## 2020-05-06 ENCOUNTER — APPOINTMENT (OUTPATIENT)
Dept: RADIATION ONCOLOGY | Facility: CLINIC | Age: 65
End: 2020-05-06
Payer: COMMERCIAL

## 2020-05-06 ENCOUNTER — APPOINTMENT (OUTPATIENT)
Dept: RADIATION ONCOLOGY | Facility: CLINIC | Age: 65
End: 2020-05-06
Attending: RADIOLOGY
Payer: COMMERCIAL

## 2020-05-07 ENCOUNTER — TELEPHONE (OUTPATIENT)
Dept: HEMATOLOGY ONCOLOGY | Facility: CLINIC | Age: 65
End: 2020-05-07

## 2020-05-07 ENCOUNTER — APPOINTMENT (OUTPATIENT)
Dept: RADIATION ONCOLOGY | Facility: CLINIC | Age: 65
End: 2020-05-07
Attending: RADIOLOGY
Payer: COMMERCIAL

## 2020-05-08 ENCOUNTER — APPOINTMENT (OUTPATIENT)
Dept: RADIATION ONCOLOGY | Facility: CLINIC | Age: 65
End: 2020-05-08
Attending: RADIOLOGY
Payer: COMMERCIAL

## 2020-05-11 ENCOUNTER — APPOINTMENT (OUTPATIENT)
Dept: RADIATION ONCOLOGY | Facility: CLINIC | Age: 65
End: 2020-05-11
Attending: RADIOLOGY
Payer: COMMERCIAL

## 2020-05-12 ENCOUNTER — APPOINTMENT (OUTPATIENT)
Dept: RADIATION ONCOLOGY | Facility: CLINIC | Age: 65
End: 2020-05-12
Attending: RADIOLOGY
Payer: COMMERCIAL

## 2020-05-12 ENCOUNTER — HOSPITAL ENCOUNTER (OUTPATIENT)
Dept: INFUSION CENTER | Facility: CLINIC | Age: 65
Discharge: HOME | End: 2020-05-12
Payer: COMMERCIAL

## 2020-05-12 VITALS — TEMPERATURE: 97.2 F

## 2020-05-12 DIAGNOSIS — Z95.828 PORT-A-CATH IN PLACE: Primary | ICD-10-CM

## 2020-05-12 DIAGNOSIS — C50.512 MALIGNANT NEOPLASM OF LOWER-OUTER QUADRANT OF LEFT FEMALE BREAST, UNSPECIFIED ESTROGEN RECEPTOR STATUS (HCC): ICD-10-CM

## 2020-05-12 LAB
BASOPHILS # BLD AUTO: 0.01 THOUSANDS/ΜL (ref 0–0.1)
BASOPHILS NFR BLD AUTO: 0 % (ref 0–1)
EOSINOPHIL # BLD AUTO: 0.06 THOUSAND/ΜL (ref 0–0.61)
EOSINOPHIL NFR BLD AUTO: 2 % (ref 0–6)
ERYTHROCYTE [DISTWIDTH] IN BLOOD BY AUTOMATED COUNT: 12.9 % (ref 11.6–15.1)
HCT VFR BLD AUTO: 38.7 % (ref 34.8–46.1)
HGB BLD-MCNC: 13 G/DL (ref 11.5–15.4)
LYMPHOCYTES # BLD AUTO: 0.99 THOUSANDS/ΜL (ref 0.6–4.47)
LYMPHOCYTES NFR BLD AUTO: 27 % (ref 14–44)
MCH RBC QN AUTO: 35.6 PG (ref 26.8–34.3)
MCHC RBC AUTO-ENTMCNC: 33.6 G/DL (ref 31.4–37.4)
MCV RBC AUTO: 106 FL (ref 82–98)
MONOCYTES # BLD AUTO: 0.37 THOUSAND/ΜL (ref 0.17–1.22)
MONOCYTES NFR BLD AUTO: 10 % (ref 4–12)
NEUTROPHILS # BLD AUTO: 2.23 THOUSANDS/ΜL (ref 1.85–7.62)
NEUTS SEG NFR BLD AUTO: 61 % (ref 43–75)
PLATELET # BLD AUTO: 181 THOUSANDS/UL (ref 149–390)
PMV BLD AUTO: 9.5 FL (ref 8.9–12.7)
RBC # BLD AUTO: 3.65 MILLION/UL (ref 3.81–5.12)
WBC # BLD AUTO: 3.66 THOUSAND/UL (ref 4.31–10.16)

## 2020-05-12 PROCEDURE — 85025 COMPLETE CBC W/AUTO DIFF WBC: CPT | Performed by: RADIOLOGY

## 2020-05-13 ENCOUNTER — APPOINTMENT (OUTPATIENT)
Dept: RADIATION ONCOLOGY | Facility: CLINIC | Age: 65
End: 2020-05-13
Attending: RADIOLOGY
Payer: COMMERCIAL

## 2020-05-13 ENCOUNTER — APPOINTMENT (OUTPATIENT)
Dept: RADIATION ONCOLOGY | Facility: CLINIC | Age: 65
End: 2020-05-13
Payer: COMMERCIAL

## 2020-05-14 ENCOUNTER — APPOINTMENT (OUTPATIENT)
Dept: RADIATION ONCOLOGY | Facility: CLINIC | Age: 65
End: 2020-05-14
Attending: RADIOLOGY
Payer: COMMERCIAL

## 2020-05-15 ENCOUNTER — APPOINTMENT (OUTPATIENT)
Dept: RADIATION ONCOLOGY | Facility: CLINIC | Age: 65
End: 2020-05-15
Attending: RADIOLOGY
Payer: COMMERCIAL

## 2020-05-18 ENCOUNTER — APPOINTMENT (OUTPATIENT)
Dept: RADIATION ONCOLOGY | Facility: CLINIC | Age: 65
End: 2020-05-18
Attending: RADIOLOGY
Payer: COMMERCIAL

## 2020-05-19 ENCOUNTER — APPOINTMENT (OUTPATIENT)
Dept: RADIATION ONCOLOGY | Facility: CLINIC | Age: 65
End: 2020-05-19
Attending: RADIOLOGY
Payer: COMMERCIAL

## 2020-05-20 ENCOUNTER — APPOINTMENT (OUTPATIENT)
Dept: RADIATION ONCOLOGY | Facility: CLINIC | Age: 65
End: 2020-05-20
Attending: RADIOLOGY
Payer: COMMERCIAL

## 2020-05-20 ENCOUNTER — APPOINTMENT (OUTPATIENT)
Dept: RADIATION ONCOLOGY | Facility: CLINIC | Age: 65
End: 2020-05-20
Payer: COMMERCIAL

## 2020-05-21 ENCOUNTER — APPOINTMENT (OUTPATIENT)
Dept: RADIATION ONCOLOGY | Facility: CLINIC | Age: 65
End: 2020-05-21
Attending: RADIOLOGY
Payer: COMMERCIAL

## 2020-05-22 ENCOUNTER — HOSPITAL ENCOUNTER (OUTPATIENT)
Dept: INFUSION CENTER | Facility: CLINIC | Age: 65
Discharge: HOME | End: 2020-05-22
Payer: COMMERCIAL

## 2020-05-22 ENCOUNTER — APPOINTMENT (OUTPATIENT)
Dept: RADIATION ONCOLOGY | Facility: CLINIC | Age: 65
End: 2020-05-22
Attending: RADIOLOGY
Payer: COMMERCIAL

## 2020-05-22 VITALS
WEIGHT: 166.01 LBS | DIASTOLIC BLOOD PRESSURE: 86 MMHG | SYSTOLIC BLOOD PRESSURE: 150 MMHG | RESPIRATION RATE: 18 BRPM | HEART RATE: 94 BPM | TEMPERATURE: 98.6 F | BODY MASS INDEX: 31.79 KG/M2

## 2020-05-22 DIAGNOSIS — C50.512 MALIGNANT NEOPLASM OF LOWER-OUTER QUADRANT OF LEFT BREAST OF FEMALE, ESTROGEN RECEPTOR POSITIVE (HCC): ICD-10-CM

## 2020-05-22 DIAGNOSIS — T45.1X5A CHEMOTHERAPY INDUCED NEUTROPENIA (HCC): Primary | ICD-10-CM

## 2020-05-22 DIAGNOSIS — Z17.0 MALIGNANT NEOPLASM OF LOWER-OUTER QUADRANT OF LEFT BREAST OF FEMALE, ESTROGEN RECEPTOR POSITIVE (HCC): ICD-10-CM

## 2020-05-22 DIAGNOSIS — D70.1 CHEMOTHERAPY INDUCED NEUTROPENIA (HCC): Primary | ICD-10-CM

## 2020-05-22 PROCEDURE — 96413 CHEMO IV INFUSION 1 HR: CPT

## 2020-05-22 RX ORDER — SODIUM CHLORIDE 9 MG/ML
20 INJECTION, SOLUTION INTRAVENOUS ONCE
Status: COMPLETED | OUTPATIENT
Start: 2020-05-22 | End: 2020-05-22

## 2020-05-22 RX ADMIN — TRASTUZUMAB 450 MG: 150 INJECTION, POWDER, LYOPHILIZED, FOR SOLUTION INTRAVENOUS at 10:07

## 2020-05-22 RX ADMIN — SODIUM CHLORIDE 20 ML/HR: 0.9 INJECTION, SOLUTION INTRAVENOUS at 10:07

## 2020-05-26 ENCOUNTER — TELEPHONE (OUTPATIENT)
Dept: SURGICAL ONCOLOGY | Facility: CLINIC | Age: 65
End: 2020-05-26

## 2020-05-26 ENCOUNTER — APPOINTMENT (OUTPATIENT)
Dept: RADIATION ONCOLOGY | Facility: CLINIC | Age: 65
End: 2020-05-26
Attending: RADIOLOGY
Payer: COMMERCIAL

## 2020-05-27 ENCOUNTER — APPOINTMENT (OUTPATIENT)
Dept: RADIATION ONCOLOGY | Facility: CLINIC | Age: 65
End: 2020-05-27
Payer: COMMERCIAL

## 2020-05-27 ENCOUNTER — APPOINTMENT (OUTPATIENT)
Dept: RADIATION ONCOLOGY | Facility: CLINIC | Age: 65
End: 2020-05-27
Attending: RADIOLOGY
Payer: COMMERCIAL

## 2020-05-28 ENCOUNTER — OFFICE VISIT (OUTPATIENT)
Dept: HEMATOLOGY ONCOLOGY | Facility: CLINIC | Age: 65
End: 2020-05-28
Payer: COMMERCIAL

## 2020-05-28 ENCOUNTER — OFFICE VISIT (OUTPATIENT)
Dept: SURGICAL ONCOLOGY | Facility: CLINIC | Age: 65
End: 2020-05-28
Payer: COMMERCIAL

## 2020-05-28 ENCOUNTER — APPOINTMENT (OUTPATIENT)
Dept: RADIATION ONCOLOGY | Facility: CLINIC | Age: 65
End: 2020-05-28
Attending: RADIOLOGY
Payer: COMMERCIAL

## 2020-05-28 VITALS
DIASTOLIC BLOOD PRESSURE: 86 MMHG | OXYGEN SATURATION: 98 % | TEMPERATURE: 96.2 F | HEART RATE: 91 BPM | WEIGHT: 164.6 LBS | SYSTOLIC BLOOD PRESSURE: 152 MMHG | BODY MASS INDEX: 31.08 KG/M2 | RESPIRATION RATE: 18 BRPM | HEIGHT: 61 IN

## 2020-05-28 VITALS
HEART RATE: 100 BPM | WEIGHT: 164 LBS | DIASTOLIC BLOOD PRESSURE: 86 MMHG | BODY MASS INDEX: 30.96 KG/M2 | RESPIRATION RATE: 16 BRPM | TEMPERATURE: 96.8 F | SYSTOLIC BLOOD PRESSURE: 140 MMHG | HEIGHT: 61 IN

## 2020-05-28 DIAGNOSIS — D70.1 CHEMOTHERAPY INDUCED NEUTROPENIA (HCC): ICD-10-CM

## 2020-05-28 DIAGNOSIS — Z17.0 MALIGNANT NEOPLASM OF LOWER-OUTER QUADRANT OF LEFT BREAST OF FEMALE, ESTROGEN RECEPTOR POSITIVE (HCC): Primary | ICD-10-CM

## 2020-05-28 DIAGNOSIS — C50.512 MALIGNANT NEOPLASM OF LOWER-OUTER QUADRANT OF LEFT BREAST OF FEMALE, ESTROGEN RECEPTOR POSITIVE (HCC): Primary | ICD-10-CM

## 2020-05-28 DIAGNOSIS — T45.1X5A CHEMOTHERAPY INDUCED NEUTROPENIA (HCC): ICD-10-CM

## 2020-05-28 PROCEDURE — 99214 OFFICE O/P EST MOD 30 MIN: CPT | Performed by: INTERNAL MEDICINE

## 2020-05-28 PROCEDURE — 99214 OFFICE O/P EST MOD 30 MIN: CPT | Performed by: NURSE PRACTITIONER

## 2020-05-28 RX ORDER — SODIUM CHLORIDE 9 MG/ML
20 INJECTION, SOLUTION INTRAVENOUS ONCE
Status: CANCELLED | OUTPATIENT
Start: 2020-09-28

## 2020-05-28 RX ORDER — SODIUM CHLORIDE 9 MG/ML
20 INJECTION, SOLUTION INTRAVENOUS ONCE
Status: CANCELLED | OUTPATIENT
Start: 2020-07-24

## 2020-05-28 RX ORDER — SODIUM CHLORIDE 9 MG/ML
20 INJECTION, SOLUTION INTRAVENOUS ONCE
Status: CANCELLED | OUTPATIENT
Start: 2020-09-04

## 2020-05-28 RX ORDER — ANASTROZOLE 1 MG/1
1 TABLET ORAL DAILY
Qty: 90 TABLET | Refills: 1 | Status: SHIPPED | OUTPATIENT
Start: 2020-05-28 | End: 2020-07-30 | Stop reason: SDUPTHER

## 2020-05-28 RX ORDER — SODIUM CHLORIDE 9 MG/ML
20 INJECTION, SOLUTION INTRAVENOUS ONCE
Status: CANCELLED | OUTPATIENT
Start: 2020-08-14

## 2020-05-29 ENCOUNTER — APPOINTMENT (OUTPATIENT)
Dept: RADIATION ONCOLOGY | Facility: CLINIC | Age: 65
End: 2020-05-29
Attending: RADIOLOGY
Payer: COMMERCIAL

## 2020-06-01 ENCOUNTER — APPOINTMENT (OUTPATIENT)
Dept: RADIATION ONCOLOGY | Facility: CLINIC | Age: 65
End: 2020-06-01
Attending: RADIOLOGY
Payer: COMMERCIAL

## 2020-06-01 PROCEDURE — 77412 RADIATION TX DELIVERY LVL 3: CPT | Performed by: RADIOLOGY

## 2020-06-01 PROCEDURE — 77387 GUIDANCE FOR RADJ TX DLVR: CPT | Performed by: RADIOLOGY

## 2020-06-01 PROCEDURE — 77336 RADIATION PHYSICS CONSULT: CPT | Performed by: RADIOLOGY

## 2020-06-02 ENCOUNTER — APPOINTMENT (OUTPATIENT)
Dept: RADIATION ONCOLOGY | Facility: CLINIC | Age: 65
End: 2020-06-02
Attending: RADIOLOGY
Payer: COMMERCIAL

## 2020-06-02 PROCEDURE — 77387 GUIDANCE FOR RADJ TX DLVR: CPT | Performed by: RADIOLOGY

## 2020-06-02 PROCEDURE — 77412 RADIATION TX DELIVERY LVL 3: CPT | Performed by: RADIOLOGY

## 2020-06-03 ENCOUNTER — APPOINTMENT (OUTPATIENT)
Dept: RADIATION ONCOLOGY | Facility: CLINIC | Age: 65
End: 2020-06-03
Payer: COMMERCIAL

## 2020-06-12 ENCOUNTER — HOSPITAL ENCOUNTER (OUTPATIENT)
Dept: INFUSION CENTER | Facility: CLINIC | Age: 65
Discharge: HOME | End: 2020-06-12
Payer: COMMERCIAL

## 2020-06-12 VITALS
RESPIRATION RATE: 18 BRPM | HEART RATE: 87 BPM | SYSTOLIC BLOOD PRESSURE: 144 MMHG | DIASTOLIC BLOOD PRESSURE: 80 MMHG | HEIGHT: 61 IN | WEIGHT: 163.8 LBS | TEMPERATURE: 96.9 F | BODY MASS INDEX: 30.93 KG/M2

## 2020-06-12 DIAGNOSIS — T45.1X5A CHEMOTHERAPY INDUCED NEUTROPENIA (HCC): Primary | ICD-10-CM

## 2020-06-12 DIAGNOSIS — D70.1 CHEMOTHERAPY INDUCED NEUTROPENIA (HCC): Primary | ICD-10-CM

## 2020-06-12 DIAGNOSIS — C50.512 MALIGNANT NEOPLASM OF LOWER-OUTER QUADRANT OF LEFT BREAST OF FEMALE, ESTROGEN RECEPTOR POSITIVE (HCC): ICD-10-CM

## 2020-06-12 DIAGNOSIS — Z17.0 MALIGNANT NEOPLASM OF LOWER-OUTER QUADRANT OF LEFT BREAST OF FEMALE, ESTROGEN RECEPTOR POSITIVE (HCC): ICD-10-CM

## 2020-06-12 PROCEDURE — 96413 CHEMO IV INFUSION 1 HR: CPT

## 2020-06-12 RX ORDER — SODIUM CHLORIDE 9 MG/ML
20 INJECTION, SOLUTION INTRAVENOUS ONCE
Status: COMPLETED | OUTPATIENT
Start: 2020-06-12 | End: 2020-06-12

## 2020-06-12 RX ADMIN — SODIUM CHLORIDE 20 ML/HR: 0.9 INJECTION, SOLUTION INTRAVENOUS at 09:00

## 2020-06-12 RX ADMIN — TRASTUZUMAB 450 MG: 150 INJECTION, POWDER, LYOPHILIZED, FOR SOLUTION INTRAVENOUS at 09:43

## 2020-06-22 ENCOUNTER — OFFICE VISIT (OUTPATIENT)
Dept: FAMILY MEDICINE CLINIC | Facility: CLINIC | Age: 65
End: 2020-06-22
Payer: COMMERCIAL

## 2020-06-22 ENCOUNTER — TELEPHONE (OUTPATIENT)
Dept: FAMILY MEDICINE CLINIC | Facility: CLINIC | Age: 65
End: 2020-06-22

## 2020-06-22 VITALS
OXYGEN SATURATION: 99 % | SYSTOLIC BLOOD PRESSURE: 126 MMHG | BODY MASS INDEX: 31.19 KG/M2 | WEIGHT: 165.2 LBS | HEART RATE: 94 BPM | HEIGHT: 61 IN | DIASTOLIC BLOOD PRESSURE: 86 MMHG

## 2020-06-22 DIAGNOSIS — I10 ESSENTIAL HYPERTENSION: Primary | ICD-10-CM

## 2020-06-22 DIAGNOSIS — Z11.4 SCREENING FOR HIV (HUMAN IMMUNODEFICIENCY VIRUS): ICD-10-CM

## 2020-06-22 DIAGNOSIS — Z11.59 NEED FOR HEPATITIS C SCREENING TEST: ICD-10-CM

## 2020-06-22 DIAGNOSIS — Z17.0 MALIGNANT NEOPLASM OF LOWER-OUTER QUADRANT OF LEFT BREAST OF FEMALE, ESTROGEN RECEPTOR POSITIVE (HCC): ICD-10-CM

## 2020-06-22 DIAGNOSIS — Z20.822 CLOSE EXPOSURE TO COVID-19 VIRUS: ICD-10-CM

## 2020-06-22 DIAGNOSIS — E78.2 MIXED HYPERLIPIDEMIA: ICD-10-CM

## 2020-06-22 DIAGNOSIS — C50.512 MALIGNANT NEOPLASM OF LOWER-OUTER QUADRANT OF LEFT BREAST OF FEMALE, ESTROGEN RECEPTOR POSITIVE (HCC): ICD-10-CM

## 2020-06-22 PROBLEM — E78.5 HYPERLIPIDEMIA: Status: ACTIVE | Noted: 2020-06-22

## 2020-06-22 PROCEDURE — 3074F SYST BP LT 130 MM HG: CPT | Performed by: FAMILY MEDICINE

## 2020-06-22 PROCEDURE — 3079F DIAST BP 80-89 MM HG: CPT | Performed by: FAMILY MEDICINE

## 2020-06-22 PROCEDURE — 99204 OFFICE O/P NEW MOD 45 MIN: CPT | Performed by: FAMILY MEDICINE

## 2020-06-22 PROCEDURE — 1036F TOBACCO NON-USER: CPT | Performed by: FAMILY MEDICINE

## 2020-06-22 PROCEDURE — 3008F BODY MASS INDEX DOCD: CPT | Performed by: FAMILY MEDICINE

## 2020-06-22 RX ORDER — OLMESARTAN MEDOXOMIL 40 MG/1
TABLET ORAL
COMMUNITY
Start: 2020-05-29 | End: 2020-10-28 | Stop reason: SDUPTHER

## 2020-07-02 ENCOUNTER — HOSPITAL ENCOUNTER (OUTPATIENT)
Dept: INFUSION CENTER | Facility: CLINIC | Age: 65
Discharge: HOME | End: 2020-07-02
Payer: COMMERCIAL

## 2020-07-02 ENCOUNTER — TELEMEDICINE (OUTPATIENT)
Dept: RADIATION ONCOLOGY | Facility: CLINIC | Age: 65
End: 2020-07-02
Attending: RADIOLOGY

## 2020-07-02 VITALS
RESPIRATION RATE: 18 BRPM | DIASTOLIC BLOOD PRESSURE: 89 MMHG | WEIGHT: 166.01 LBS | HEART RATE: 89 BPM | BODY MASS INDEX: 31.37 KG/M2 | SYSTOLIC BLOOD PRESSURE: 165 MMHG | TEMPERATURE: 97.5 F

## 2020-07-02 DIAGNOSIS — Z17.0 MALIGNANT NEOPLASM OF LOWER-OUTER QUADRANT OF LEFT BREAST OF FEMALE, ESTROGEN RECEPTOR POSITIVE (HCC): ICD-10-CM

## 2020-07-02 DIAGNOSIS — T45.1X5A CHEMOTHERAPY INDUCED NEUTROPENIA (HCC): Primary | ICD-10-CM

## 2020-07-02 DIAGNOSIS — Z17.0 MALIGNANT NEOPLASM OF LOWER-OUTER QUADRANT OF LEFT BREAST OF FEMALE, ESTROGEN RECEPTOR POSITIVE (HCC): Primary | ICD-10-CM

## 2020-07-02 DIAGNOSIS — Z79.811 USE OF ANASTROZOLE (ARIMIDEX): ICD-10-CM

## 2020-07-02 DIAGNOSIS — D70.1 CHEMOTHERAPY INDUCED NEUTROPENIA (HCC): Primary | ICD-10-CM

## 2020-07-02 DIAGNOSIS — C50.512 MALIGNANT NEOPLASM OF LOWER-OUTER QUADRANT OF LEFT BREAST OF FEMALE, ESTROGEN RECEPTOR POSITIVE (HCC): Primary | ICD-10-CM

## 2020-07-02 DIAGNOSIS — C50.512 MALIGNANT NEOPLASM OF LOWER-OUTER QUADRANT OF LEFT BREAST OF FEMALE, ESTROGEN RECEPTOR POSITIVE (HCC): ICD-10-CM

## 2020-07-02 PROCEDURE — 96413 CHEMO IV INFUSION 1 HR: CPT

## 2020-07-02 RX ORDER — SODIUM CHLORIDE 9 MG/ML
20 INJECTION, SOLUTION INTRAVENOUS ONCE
Status: COMPLETED | OUTPATIENT
Start: 2020-07-02 | End: 2020-07-02

## 2020-07-02 RX ADMIN — TRASTUZUMAB 450 MG: 150 INJECTION, POWDER, LYOPHILIZED, FOR SOLUTION INTRAVENOUS at 09:31

## 2020-07-02 RX ADMIN — SODIUM CHLORIDE 20 ML/HR: 0.9 INJECTION, SOLUTION INTRAVENOUS at 09:31

## 2020-07-02 NOTE — PROGRESS NOTES
Follow-up - Radiation Oncology   Macho Patel 1955 62 y o  female [id number]          REQUIRED DOCUMENTATION:     1  This service was provided via Telemedicine  2  Provider located at [facility]  3  TeleMed provider: Brenna Currie MD   4  Identify all parties in room with patient during tele consult:  Myself and the patient  5  After connecting through [employer], patient was identified by name and date of birth and assistant checked wristband  Patient was then informed that this was a Telemedicine visit and that the exam was being conducted confidentially over secure lines  My office door was closed  No one else was in the room  Patient acknowledged consent and understanding of privacy and security of the Telemedicine visit, and gave us permission to have the assistant stay in the room in order to assist with the history and to conduct the exam   I informed the patient that I have reviewed their record in Epic and presented the opportunity for them to ask any questions regarding the visit today  The patient agreed to participate  It was my intent to perform this visit via video technology but the patient was not able to do a video connection so the visit was completed via audio telephone only  History of Present Illness   Cancer Staging  Malignant neoplasm of lower-outer quadrant of left breast of female, estrogen receptor positive ([facility] [address] )  Staging form: Breast, AJCC 8th Edition  - Pathologic: Stage IIA (pT2, pN0(sn), cM0, G3, ER+, WV-, HER2+) - Unsigned  Neoadjuvant therapy: No  Laterality: Left  Tumor size (mm): 22  Method of lymph node assessment: [place] lymph node biopsy  Histologic grading system: 3 grade system      Macho Patel is a 62y o  year old female with a history of left breast cancer  She is s/p lumpectomy, sentinel lymph node biopsy, and chemotherapy   She completed radiation to the left breast 6/2/20 5/28/20 Skyla Santos NP- She has no new complaints today and there are no concerns on today's exam   I will delay her mammogram until November to allow for healing from her radiation therapy  F/u in 6 months    5/28/20 Dr Tonya Gaviria- Clinically, she has no evidence recurrent disease  I recommended her to continue with trastuzumab every 3 weeks until November 2020  I will put her on anastrozole 1 mg once a day  Echocardiogram June 2020 for cardiac monitoring  F/u in 3 months  She reports she is feeling well  Her skin reaction from radiation has resolved  She has been applying moisturizer and massaging the left breast on a daily basis  She denies any breast masses bilaterally  She has been on anastrozole now for about 1 month and has minimal hot flashes  She is tolerating trastuzumab well and had another infusion this morning  Interval History:  7/2/20 Infusion trastuzumab  7/21/20 Echo  8/27/20 Dr Tonya Gaviria  11/2/20 mammogram  11/30/20 Ulsophie Mons NP    Historical Information   Oncology History    Robert Meyers is a 62y o  year old female with a h/o left breast cancer  She is s/p lumpectomy, sentinel lymph node biopsy, and chemotherapy  She completed radiation to the left breast 6/2/20 5/28/20 Ulis Mons NP- She has no new complaints today and there are no concerns on today's exam   I will delay her mammogram until November to allow for healing from her radiation therapy  F/u in 6 months    5/28/20 Dr Tonya Gaviria- Clinically, she has no evidence recurrent disease  I recommended her to continue with trastuzumab every 3 weeks until November 2020  I will put her on anastrozole 1 mg once a day  Echocardiogram June 2020 for cardiac monitoring   F/u in 3 months    7/2/20 infusion  8/27/20 Dr Tonya Gaviria  11/2/20 mammogram  11/30/20 Ulis Mons NP        Malignant neoplasm of lower-outer quadrant of left breast of female, estrogen receptor positive ([facility] [address] )    9/17/2019 Biopsy     Left breast ultrasound-guided biospy  A  5 o'clock, 7 cm from nipple  Invasive mammary carcinoma of no special type (ductal, not otherwise specified)  Grade 1  ER 70  MA < 1  HER2 3+    B  1 o'clock, 5 cm from nipple  Benign, fibrocystic changes without atypia  Involving intraductal micropapilloma  No evidence of malignancy    Right breast stereotactic biopsy  C  Right breast without calcifications  Benign, fibrocystic changes without atypia  No evidence of malignancy    Concordant  Unifocal; measures 2 cm on US  Left axilla negative  Right breast clear  Carcinoma with butterfly shaped clip       10/10/2019 Genetic Testing     The following genes were evaluated: MOI, BRCA1, BRCA2, CDH1, CHEK2, PALB2, PTEN, STK11, TP53  Negative result   No pathogenic sequence variants or deletions/dupllications identified  [employer]      10/29/2019 Surgery     Left breast needle localized lumpectomy with sentinel lymph node biopsy  Invasive mammary carcinoma of no special type (ductal, not otherwise specified)  Grade 3  2 2 cm  Margins negative  0/1 Lymph Nodes  Anatomic/Prognostic Stage IIA      12/6/2019 -  Chemotherapy     pegfilgrastim (NEULASTA) subcutaneous injection 6 mg, 6 mg, Subcutaneous, Once, 1 of 1 cycle  Administration: 6 mg (12/30/2019)  pegfilgrastim (NEULASTA ONPRO) subcutaneous injection kit 6 mg, 6 mg, Subcutaneous, Once, 6 of 6 cycles  Administration: 6 mg (12/6/2019), 6 mg (12/27/2019), 6 mg (1/17/2020), 6 mg (2/7/2020), 6 mg (2/28/2020), 6 mg (3/20/2020)  fosaprepitant (EMEND) 150 mg in sodium chloride 0 9 % 255 mL IVPB, 150 mg, Intravenous, Once, 6 of 6 cycles  Administration: 150 mg (12/6/2019), 150 mg (12/27/2019), 150 mg (1/17/2020), 150 mg (2/7/2020), 150 mg (2/28/2020), 150 mg (3/20/2020)  CARBOplatin (PARAPLATIN) 616 8 mg in sodium chloride 0 9 % 250 mL IVPB, 616 8 mg, Intravenous, Once, 6 of 6 cycles  Administration: 616 8 mg (12/6/2019), 616 8 mg (12/27/2019), 616 8 mg (1/17/2020), 616 8 mg (2/7/2020), 616 8 mg (2/28/2020), 616 8 mg (3/20/2020)  DOCEtaxel (TAXOTERE) 106 2 mg in sodium chloride 0 9 % 250 mL chemo infusion, 60 mg/m2 = 106 2 mg (80 % of original dose 75 mg/m2), Intravenous, Once, 6 of 6 cycles  Dose modification: 60 mg/m2 (original dose 75 mg/m2, Cycle 1, Reason: Dose Not Tolerated), 75 mg/m2 (original dose 75 mg/m2, Cycle 2, Reason: Other (See Comments), Comment: LFT normalized  )  Administration: 106 2 mg (12/6/2019), 132 8 mg (12/27/2019), 132 8 mg (1/17/2020), 132 8 mg (2/7/2020), 132 8 mg (2/28/2020), 132 8 mg (3/20/2020)  trastuzumab (HERCEPTIN) 600 mg in sodium chloride 0 9 % 250 mL chemo infusion, 624 mg, Intravenous, Once, 11 of 18 cycles  Administration: 600 mg (12/6/2019), 450 mg (12/27/2019), 450 mg (4/10/2020), 450 mg (1/17/2020), 450 mg (2/7/2020), 450 mg (2/28/2020), 450 mg (3/20/2020), 450 mg (5/1/2020), 450 mg (5/22/2020), 450 mg (6/12/2020)      5/4/2020 - 6/2/2020 Radiation     Plan ID Energy Fractions Dose per Fraction (cGy) Dose Correction (cGy) Total Dose Delivered (cGy) Elapsed Days   BH L BOOST 6X 5 / 5 200 0 1,000 6   BH L BREAST 6X 16 / 16 266 0 4,256 22              Past Medical History:   Diagnosis Date    Breast cancer ([facility] [address] )     grade 3 ER Positive SD negative Her 2 3+ disease    Hyperlipidemia     Hypertension     Vertigo     no medication     Past Surgical History:   Procedure Laterality Date    BREAST BIOPSY Left 09/17/2019    BREAST LUMPECTOMY Left 10/29/2019    Procedure: BREAST NEEDLE LOCALIZED LUMPECTOMY (NEEDLE LOC AT 0800); Surgeon: Sondra Rebolledo MD;  [facility];  Service: Surgical Oncology    FRACTURE SURGERY Left     surgery repair- elbow     IR PORT PLACEMENT  12/3/2019    LYMPH NODE BIOPSY Left 10/29/2019    Procedure: SENTINEL LYMPH NODE BIOPSY; LYMPHATIC MAPPING WITH BLUE DYE AND RADIOACTIVE DYE (INJECT AT 0900 BY DR PALACIOS IN THE OR);   Surgeon: Sondra Rebolledo MD;  [facility];  Service: Surgical Oncology    MAMMO NEEDLE LOCALIZATION LEFT (ALL INC) Left 10/29/2019    MAMMO STEREOTACTIC BREAST BIOPSY RIGHT (ALL INC) Right 9/17/2019    US GUIDANCE BREAST BIOPSY LEFT EACH ADDITIONAL Left 9/17/2019    US GUIDED BREAST BIOPSY LEFT COMPLETE Left 9/17/2019    WISDOM TOOTH EXTRACTION Bilateral        Social History   Social History     Substance and Sexual Activity   Alcohol Use Yes    Frequency: 2-3 times a week    Drinks per session: 3 or 4     Social History     Substance and Sexual Activity   Drug Use Never     Social History     Tobacco Use   Smoking Status Former Smoker    Packs/day: 0 75    Years: 38 00    Pack years: 28 50    Last attempt to quit: 2010    Years since quitting: 10 5   Smokeless Tobacco Never Used     Meds/Allergies     Current Outpatient Medications:     anastrozole (ARIMIDEX) 1 mg tablet, Take 1 tablet (1 mg total) by mouth daily, Disp: 90 tablet, Rfl: 1    Cholecalciferol (VITAMIN D3) 1000 units CAPS, Take 1,000 capsules by mouth daily in the early morning , Disp: , Rfl:     cyanocobalamin (VITAMIN B-12) 100 mcg tablet, Take 100 mcg by mouth daily after breakfast , Disp: , Rfl:     lidocaine-prilocaine (EMLA) cream, Apply topically as needed for mild pain, Disp: 30 g, Rfl: 1    Multiple Vitamins-Minerals (CENTRUM SILVER 50+WOMEN PO), Take 1 tablet by mouth daily in the early morning , Disp: , Rfl:     olmesartan (BENICAR) 40 mg tablet, , Disp: , Rfl:     rosuvastatin (CRESTOR) 5 mg tablet, Take 5 mg by mouth once a week , Disp: , Rfl: 3  No current facility-administered medications for this visit  Allergies   Allergen Reactions    Adhesive [Medical Tape]      Review of Systems   Fourteen point review of system is negative except for as noted history of present illness  OBJECTIVE:   There were no vitals taken for this visit  Pain Assessment:  0  ECOG/Zubrod/WHO: 1 - Symptomatic but completely ambulatory    Physical Exam   Telemedicine phone visit, so no physical examination      RESULTS    Lab Results: No results found for this or any previous visit (from the past 672 hour(s))  Imaging Studies:No results found  Assessment/Plan:  No orders of the defined types were placed in this encounter  Kg Thurman is a 62y o  year old female with a stage IIA, pT2, pN0, M0 grade 3, ER positive, MD negative, HER2 positive invasive mammary carcinoma who is status post lumpectomy for a 2 2 cm primary with negative margins  There was 1 sentinel lymph node that was negative for any metastatic disease  She is negative for BRCA gene mutation  She was seen by Dr Annette Oliveira and completed adjuvant chemotherapy with SRUTHI KERR on March 20, 2020  In order to complete her breast conservation management,  we recommended radiation therapy to the entire left breast    She completed radiation therapy on June 2, 2020 and returns today for follow-up visit  She has recovered well from radiation therapy  Her skin reaction has resolved  She will continue to apply moisturizer and massage the treated left breast on a daily basis  She started anastrozole 1 month ago and is tolerating this well with minimal hot flashes  She is continuing with her trastuzumab which she had this morning until November of 2020  She is scheduled for repeat mammogram in November and then will be seen by surgical oncology  She will return here for follow-up in 6 months  Ofelia Beaver MD  2/2/5855,77:85 AM    Portions of the record may have been created with voice recognition software   Occasional wrong word or "sound a like" substitutions may have occurred due to the inherent limitations of voice recognition software   Read the chart carefully and recognize, using context, where substitutions have occurred

## 2020-07-02 NOTE — PLAN OF CARE
Problem: PAIN - ADULT  Goal: Verbalizes/displays adequate comfort level or baseline comfort level  Description  Interventions:  - Encourage patient to monitor pain and request assistance  - Assess pain using appropriate pain scale  - Administer analgesics based on type and severity of pain and evaluate response  - Implement non-pharmacological measures as appropriate and evaluate response  - Consider cultural and social influences on pain and pain management  - Notify physician/advanced practitioner if interventions unsuccessful or patient reports new pain  Outcome: Progressing     Problem: INFECTION - ADULT  Goal: Absence or prevention of progression during hospitalization  Description  INTERVENTIONS:  - Assess and monitor for signs and symptoms of infection  - Monitor lab/diagnostic results  - Monitor all insertion sites, i e  indwelling lines, tubes, and drains  - Monitor endotracheal if appropriate and nasal secretions for changes in amount and color  - [place] appropriate cooling/warming therapies per order  - Administer medications as ordered  - Instruct and encourage patient and family to use good hand hygiene technique  - Identify and instruct in appropriate isolation precautions for identified infection/condition  Outcome: Progressing  Goal: Absence of fever/infection during neutropenic period  Description  INTERVENTIONS:  - Monitor WBC    Outcome: Progressing     Problem: Knowledge Deficit  Goal: Patient/family/caregiver demonstrates understanding of disease process, treatment plan, medications, and discharge instructions  Description  Complete learning assessment and assess knowledge base    Interventions:  - Provide teaching at level of understanding  - Provide teaching via preferred learning methods  Outcome: Progressing

## 2020-07-02 NOTE — PROGRESS NOTES
Pt  Denied new symptoms or concerns today  EF 60%  Echo scheduled for 7/21/20  Pt  Tolerated Herceptin without adverse event  Future appointments reviewed    Declined AVS

## 2020-07-09 DIAGNOSIS — Z20.822 CLOSE EXPOSURE TO COVID-19 VIRUS: ICD-10-CM

## 2020-07-09 PROCEDURE — U0003 INFECTIOUS AGENT DETECTION BY NUCLEIC ACID (DNA OR RNA); SEVERE ACUTE RESPIRATORY SYNDROME CORONAVIRUS 2 (SARS-COV-2) (CORONAVIRUS DISEASE [COVID-19]), AMPLIFIED PROBE TECHNIQUE, MAKING USE OF HIGH THROUGHPUT TECHNOLOGIES AS DESCRIBED BY CMS-2020-01-R: HCPCS

## 2020-07-13 LAB — SARS-COV-2 RNA RESP QL NAA+PROBE: NEGATIVE

## 2020-07-21 ENCOUNTER — HOSPITAL ENCOUNTER (OUTPATIENT)
Dept: NON INVASIVE DIAGNOSTICS | Facility: HOSPITAL | Age: 65
Discharge: HOME | End: 2020-07-21
Attending: INTERNAL MEDICINE
Payer: COMMERCIAL

## 2020-07-21 DIAGNOSIS — Z17.0 MALIGNANT NEOPLASM OF LOWER-OUTER QUADRANT OF LEFT BREAST OF FEMALE, ESTROGEN RECEPTOR POSITIVE (HCC): ICD-10-CM

## 2020-07-21 DIAGNOSIS — T45.1X5A CHEMOTHERAPY INDUCED NEUTROPENIA (HCC): ICD-10-CM

## 2020-07-21 DIAGNOSIS — D70.1 CHEMOTHERAPY INDUCED NEUTROPENIA (HCC): ICD-10-CM

## 2020-07-21 DIAGNOSIS — C50.512 MALIGNANT NEOPLASM OF LOWER-OUTER QUADRANT OF LEFT BREAST OF FEMALE, ESTROGEN RECEPTOR POSITIVE (HCC): ICD-10-CM

## 2020-07-21 PROCEDURE — 93306 TTE W/DOPPLER COMPLETE: CPT

## 2020-07-21 PROCEDURE — 93306 TTE W/DOPPLER COMPLETE: CPT | Performed by: INTERNAL MEDICINE

## 2020-07-21 RX ADMIN — PERFLUTREN 0.4 ML/MIN: 6.52 INJECTION, SUSPENSION INTRAVENOUS at 12:41

## 2020-07-24 ENCOUNTER — HOSPITAL ENCOUNTER (OUTPATIENT)
Dept: INFUSION CENTER | Facility: CLINIC | Age: 65
Discharge: HOME | End: 2020-07-24
Payer: COMMERCIAL

## 2020-07-24 VITALS
BODY MASS INDEX: 31.53 KG/M2 | HEART RATE: 89 BPM | DIASTOLIC BLOOD PRESSURE: 93 MMHG | WEIGHT: 166.89 LBS | SYSTOLIC BLOOD PRESSURE: 158 MMHG | TEMPERATURE: 97.5 F | RESPIRATION RATE: 16 BRPM

## 2020-07-24 DIAGNOSIS — T45.1X5A CHEMOTHERAPY INDUCED NEUTROPENIA (HCC): Primary | ICD-10-CM

## 2020-07-24 DIAGNOSIS — C50.512 MALIGNANT NEOPLASM OF LOWER-OUTER QUADRANT OF LEFT BREAST OF FEMALE, ESTROGEN RECEPTOR POSITIVE (HCC): ICD-10-CM

## 2020-07-24 DIAGNOSIS — Z17.0 MALIGNANT NEOPLASM OF LOWER-OUTER QUADRANT OF LEFT BREAST OF FEMALE, ESTROGEN RECEPTOR POSITIVE (HCC): ICD-10-CM

## 2020-07-24 DIAGNOSIS — D70.1 CHEMOTHERAPY INDUCED NEUTROPENIA (HCC): Primary | ICD-10-CM

## 2020-07-24 PROCEDURE — 96413 CHEMO IV INFUSION 1 HR: CPT

## 2020-07-24 RX ORDER — SODIUM CHLORIDE 9 MG/ML
20 INJECTION, SOLUTION INTRAVENOUS ONCE
Status: COMPLETED | OUTPATIENT
Start: 2020-07-24 | End: 2020-07-24

## 2020-07-24 RX ADMIN — TRASTUZUMAB 450 MG: 150 INJECTION, POWDER, LYOPHILIZED, FOR SOLUTION INTRAVENOUS at 09:41

## 2020-07-24 RX ADMIN — SODIUM CHLORIDE 20 ML/HR: 0.9 INJECTION, SOLUTION INTRAVENOUS at 09:21

## 2020-07-24 NOTE — PROGRESS NOTES
Patient to the unit for herceptin infusion  Note placed by Dr Cassandria Lombard with OK to treat with EF of 50%  Patient tolerated treatment without adverse reaction  Declined AVS, aware of future appointments  Voices no questions or concerns at discharge

## 2020-07-24 NOTE — PLAN OF CARE
Problem: PAIN - ADULT  Goal: Verbalizes/displays adequate comfort level or baseline comfort level  Description  Interventions:  - Encourage patient to monitor pain and request assistance  - Assess pain using appropriate pain scale  - Administer analgesics based on type and severity of pain and evaluate response  - Implement non-pharmacological measures as appropriate and evaluate response  - Consider cultural and social influences on pain and pain management  - Notify physician/advanced practitioner if interventions unsuccessful or patient reports new pain  Outcome: Progressing     Problem: SAFETY ADULT  Goal: Patient will remain free of falls  Description  INTERVENTIONS:  - Assess patient frequently for physical needs  -  Identify cognitive and physical deficits and behaviors that affect risk of falls    -  [place] fall precautions as indicated by assessment   - Educate patient/family on patient safety including physical limitations  - Instruct patient to call for assistance with activity based on assessment  - Modify environment to reduce risk of injury  - Consider OT/PT consult to assist with strengthening/mobility  Outcome: Progressing     Problem: DISCHARGE PLANNING  Goal: Discharge to home or other facility with appropriate resources  Description  INTERVENTIONS:  - Identify barriers to discharge w/patient and caregiver  - Arrange for needed discharge resources and transportation as appropriate  - Identify discharge learning needs (meds, wound care, etc )  - Arrange for interpretive services to assist at discharge as needed  - Refer to Case Management Department for coordinating discharge planning if the patient needs post-hospital services based on physician/advanced practitioner order or complex needs related to functional status, cognitive ability, or social support system  Outcome: Progressing     Problem: Knowledge Deficit  Goal: Patient/family/caregiver demonstrates understanding of disease process, treatment plan, medications, and discharge instructions  Description  Complete learning assessment and assess knowledge base    Interventions:  - Provide teaching at level of understanding  - Provide teaching via preferred learning methods  Outcome: Progressing

## 2020-07-30 DIAGNOSIS — T45.1X5A CHEMOTHERAPY INDUCED NEUTROPENIA (HCC): ICD-10-CM

## 2020-07-30 DIAGNOSIS — D70.1 CHEMOTHERAPY INDUCED NEUTROPENIA (HCC): ICD-10-CM

## 2020-07-30 DIAGNOSIS — Z17.0 MALIGNANT NEOPLASM OF LOWER-OUTER QUADRANT OF LEFT BREAST OF FEMALE, ESTROGEN RECEPTOR POSITIVE (HCC): ICD-10-CM

## 2020-07-30 DIAGNOSIS — C50.512 MALIGNANT NEOPLASM OF LOWER-OUTER QUADRANT OF LEFT BREAST OF FEMALE, ESTROGEN RECEPTOR POSITIVE (HCC): ICD-10-CM

## 2020-07-30 RX ORDER — ANASTROZOLE 1 MG/1
1 TABLET ORAL DAILY
Qty: 90 TABLET | Refills: 0 | Status: CANCELLED | OUTPATIENT
Start: 2020-07-30

## 2020-07-30 RX ORDER — ANASTROZOLE 1 MG/1
1 TABLET ORAL DAILY
Qty: 90 TABLET | Refills: 1 | Status: SHIPPED | OUTPATIENT
Start: 2020-07-30 | End: 2021-02-08 | Stop reason: SDUPTHER

## 2020-08-01 ENCOUNTER — PREPPED CHART (OUTPATIENT)
Dept: URBAN - METROPOLITAN AREA CLINIC 6 | Facility: CLINIC | Age: 65
End: 2020-08-01

## 2020-08-07 ENCOUNTER — OFFICE VISIT (OUTPATIENT)
Dept: OBGYN CLINIC | Facility: CLINIC | Age: 65
End: 2020-08-07
Payer: COMMERCIAL

## 2020-08-07 VITALS
BODY MASS INDEX: 31.72 KG/M2 | WEIGHT: 167.9 LBS | SYSTOLIC BLOOD PRESSURE: 146 MMHG | DIASTOLIC BLOOD PRESSURE: 99 MMHG | TEMPERATURE: 97.3 F

## 2020-08-07 DIAGNOSIS — N88.8 CERVICAL MASS: Primary | ICD-10-CM

## 2020-08-07 PROCEDURE — 88305 TISSUE EXAM BY PATHOLOGIST: CPT | Performed by: PATHOLOGY

## 2020-08-07 PROCEDURE — 3077F SYST BP >= 140 MM HG: CPT | Performed by: STUDENT IN AN ORGANIZED HEALTH CARE EDUCATION/TRAINING PROGRAM

## 2020-08-07 PROCEDURE — 3080F DIAST BP >= 90 MM HG: CPT | Performed by: STUDENT IN AN ORGANIZED HEALTH CARE EDUCATION/TRAINING PROGRAM

## 2020-08-07 PROCEDURE — 99214 OFFICE O/P EST MOD 30 MIN: CPT | Performed by: STUDENT IN AN ORGANIZED HEALTH CARE EDUCATION/TRAINING PROGRAM

## 2020-08-07 PROCEDURE — 1036F TOBACCO NON-USER: CPT | Performed by: STUDENT IN AN ORGANIZED HEALTH CARE EDUCATION/TRAINING PROGRAM

## 2020-08-07 NOTE — PROGRESS NOTES
Biopsy    Date/Time: 8/7/2020 10:35 AM  Performed by: Mateo Fair MD  Authorized by: Mateo Fair MD     Procedure Details - Skin Biopsy:     Biopsy tissue type: cervical mass  Initial size (mm):  15    Malignancy: malignancy unknown       Cystic structure noted protruding from cervical os  After discussion of repeat ultrasound vs sampling does desire sampling and diagnosis via pathology today  Time out performed, cervix cleansed with betadine x3  Tischler biopsy forceps used to initially sample  Able to extract majority of cystic structure with additional traction  Hemostasis obtained with Monsel's, patient tolerated well

## 2020-08-14 ENCOUNTER — HOSPITAL ENCOUNTER (OUTPATIENT)
Dept: INFUSION CENTER | Facility: CLINIC | Age: 65
Discharge: HOME | End: 2020-08-14
Payer: COMMERCIAL

## 2020-08-14 VITALS
HEART RATE: 91 BPM | TEMPERATURE: 96.8 F | WEIGHT: 166.34 LBS | RESPIRATION RATE: 16 BRPM | SYSTOLIC BLOOD PRESSURE: 147 MMHG | DIASTOLIC BLOOD PRESSURE: 87 MMHG | BODY MASS INDEX: 31.43 KG/M2

## 2020-08-14 DIAGNOSIS — T45.1X5A CHEMOTHERAPY INDUCED NEUTROPENIA (HCC): Primary | ICD-10-CM

## 2020-08-14 DIAGNOSIS — Z17.0 MALIGNANT NEOPLASM OF LOWER-OUTER QUADRANT OF LEFT BREAST OF FEMALE, ESTROGEN RECEPTOR POSITIVE (HCC): ICD-10-CM

## 2020-08-14 DIAGNOSIS — C50.512 MALIGNANT NEOPLASM OF LOWER-OUTER QUADRANT OF LEFT BREAST OF FEMALE, ESTROGEN RECEPTOR POSITIVE (HCC): ICD-10-CM

## 2020-08-14 DIAGNOSIS — D70.1 CHEMOTHERAPY INDUCED NEUTROPENIA (HCC): Primary | ICD-10-CM

## 2020-08-14 PROCEDURE — 96413 CHEMO IV INFUSION 1 HR: CPT

## 2020-08-14 RX ORDER — SODIUM CHLORIDE 9 MG/ML
20 INJECTION, SOLUTION INTRAVENOUS ONCE
Status: COMPLETED | OUTPATIENT
Start: 2020-08-14 | End: 2020-08-14

## 2020-08-14 RX ADMIN — TRASTUZUMAB 450 MG: 150 INJECTION, POWDER, LYOPHILIZED, FOR SOLUTION INTRAVENOUS at 09:49

## 2020-08-14 RX ADMIN — SODIUM CHLORIDE 20 ML/HR: 0.9 INJECTION, SOLUTION INTRAVENOUS at 09:05

## 2020-08-14 NOTE — PROGRESS NOTES
Pt tolerated treatment today without any incident  Pt provided with AVS and aware of future appointments

## 2020-08-27 ENCOUNTER — OFFICE VISIT (OUTPATIENT)
Dept: HEMATOLOGY ONCOLOGY | Facility: CLINIC | Age: 65
End: 2020-08-27
Payer: COMMERCIAL

## 2020-08-27 VITALS
DIASTOLIC BLOOD PRESSURE: 80 MMHG | WEIGHT: 168 LBS | RESPIRATION RATE: 18 BRPM | OXYGEN SATURATION: 97 % | HEART RATE: 102 BPM | BODY MASS INDEX: 31.72 KG/M2 | TEMPERATURE: 97.7 F | SYSTOLIC BLOOD PRESSURE: 138 MMHG | HEIGHT: 61 IN

## 2020-08-27 DIAGNOSIS — D70.1 CHEMOTHERAPY INDUCED NEUTROPENIA (HCC): ICD-10-CM

## 2020-08-27 DIAGNOSIS — Z17.0 MALIGNANT NEOPLASM OF LOWER-OUTER QUADRANT OF LEFT BREAST OF FEMALE, ESTROGEN RECEPTOR POSITIVE (HCC): Primary | ICD-10-CM

## 2020-08-27 DIAGNOSIS — T45.1X5A CHEMOTHERAPY INDUCED NEUTROPENIA (HCC): ICD-10-CM

## 2020-08-27 DIAGNOSIS — C50.512 MALIGNANT NEOPLASM OF LOWER-OUTER QUADRANT OF LEFT BREAST OF FEMALE, ESTROGEN RECEPTOR POSITIVE (HCC): Primary | ICD-10-CM

## 2020-08-27 PROCEDURE — 1036F TOBACCO NON-USER: CPT | Performed by: INTERNAL MEDICINE

## 2020-08-27 PROCEDURE — 99214 OFFICE O/P EST MOD 30 MIN: CPT | Performed by: INTERNAL MEDICINE

## 2020-08-27 PROCEDURE — 3075F SYST BP GE 130 - 139MM HG: CPT | Performed by: INTERNAL MEDICINE

## 2020-08-27 PROCEDURE — 3008F BODY MASS INDEX DOCD: CPT | Performed by: INTERNAL MEDICINE

## 2020-08-27 PROCEDURE — 3079F DIAST BP 80-89 MM HG: CPT | Performed by: INTERNAL MEDICINE

## 2020-08-27 RX ORDER — SODIUM CHLORIDE 9 MG/ML
20 INJECTION, SOLUTION INTRAVENOUS ONCE
Status: CANCELLED | OUTPATIENT
Start: 2020-10-16

## 2020-08-27 RX ORDER — SODIUM CHLORIDE 9 MG/ML
20 INJECTION, SOLUTION INTRAVENOUS ONCE
Status: CANCELLED | OUTPATIENT
Start: 2020-11-27

## 2020-08-27 RX ORDER — SODIUM CHLORIDE 9 MG/ML
20 INJECTION, SOLUTION INTRAVENOUS ONCE
Status: CANCELLED | OUTPATIENT
Start: 2020-11-06

## 2020-08-27 NOTE — PROGRESS NOTES
Hematology / Oncology Outpatient Follow Up Note    Bruce Perkins 62 y o  female :1955 [id number]         Date:  2020    Assessment / Plan:  A 60-year-old postmenopausal woman with stage IIA left breast cancer, grade 3, ER 70% positive, NH negative, HER2 3+ disease   She underwent lumpectomy and sentinel lymph node biopsy, resulting in GREG   She is negative for BRCA gene mutation  She completed 6 cycle of adjuvant chemotherapy with [facility] with excellent tolerance followed by current trastuzumab monotherapy as well as anastrozole  She has no side effects from anastrozole  Her recent ejection fraction decreased somewhat  It is still normal range  Therefore, I recommended her to continue with trastuzumab every 3 weeks for 5 more cycles  I am going to obtain echocardiogram in 2020  I will see her again in 3 months for routine follow-up  She is in agreement with my recommendations         Subjective:      HPI:  A 60-year-old postmenopausal woman who noticed a lump in her left breast which she brought to medical attention   She underwent left breast biopsy in 2019 which showed invasive ductal carcinoma, grade 1  This was ER 70% positive, NH negative, HER2 3+ disease   She underwent genetic testing which was negative for BRCA gene mutation   She underwent lumpectomy and sentinel lymph node biopsy by Dr Sloan Pérez Arn to this, she had CT scan of chest abdomen pelvis which showed no obvious distant metastasis   However, she has multiple indeterminate liver lesions   Her LFT is mildly elevated   She has no history of viral hepatitis   However, she drinks 2 alcohol every day for nearly 40 years   Intermittently, she drinks more   Lumpectomy showed 2 2 cm of invasive ductal carcinoma, grade 3   There was no evidence of lymphovascular invasion   1 sentinel lymph node was negative for metastatic disease   She presents today to discuss adjuvant treatment options   She has no complaint of pain   Her weight is stable   She has no respiratory symptoms   She has no family history of breast or ovarian cancer   She quit smoking nearly 10 years ago   Her performance status is normal            Interval History:  A 62year-old postmenopausal woman with stage IIA left breast cancer, grade 3, ER 70% positive, PA negative, HER2 3+ disease   She underwent lumpectomy and sentinel lymph node biopsy, resulting in GREG   She is negative for BRCA gene mutation  She completed 6 cycle of adjuvant chemotherapy with [facility] with excellent tolerance in March 2020  She is currently on adjuvant trastuzumab monotherapy as well as adjuvant hormonal therapy with anastrozole  She presents today for routine follow-up  She has not noticed any side effects from anastrozole  She denied hot flashes or musculoskeletal symptoms  She has no exertional shortness of breath  She is very active person  She denied bone pain  Her performance status is normal         Objective:      Primary Diagnosis:     1  Left breast cancer, stage IIA (pT2, pN0, M0) grade 3, ER 70% positive, PA negative, HER2 3+ disease   Diagnosed in October 2019       2  BRCA gene mutation negative      Cancer Staging:  Cancer Staging  Malignant neoplasm of lower-outer quadrant of left breast of female, estrogen receptor positive ([facility] [address] )  Staging form: Breast, AJCC 8th Edition  - Pathologic: Stage IIA (pT2, pN0(sn), cM0, G3, ER+, PA-, HER2+) - Unsigned  Neoadjuvant therapy: No  Laterality: Left  Tumor size (mm): 22  Method of lymph node assessment: Bledsoe lymph node biopsy  Histologic grading system: 3 grade system           Previous Hematologic/ Oncologic Treatment:       Adjuvant chemotherapy with TCH x6 cycle  Completed in March 2020       Current Hematologic/ Oncologic Treatment:       1   Adjuvant trastuzumab monotherapy since April 2020    2  Adjuvant hormonal therapy with anastrozole since June 2020        Disease Status:      GREG is status post lumpectomy and sentinel lymph node biopsy      Test Results:     Pathology:     2 2 cm of invasive ductal carcinoma, grade 3  No evidence of lymphovascular invasion   1 sentinel lymph node was negative for metastatic disease   ER 70% positive, MS negative, HER2 3+ disease   Stage IIA (pT2, pN0, M0)     Radiology:     CT scan of chest abdomen pelvis showed multiple indeterminate liver lesion, likely to be cyst       Echocardiogram in July 2020 showed ejection fraction 50%     Laboratory:     See below      Physical Exam:        General Appearance:    Alert, oriented          Eyes:    PERRL   Ears:    Normal external ear canals, both ears   Nose:   Nares normal, septum midline   Throat:   Mucosa moist  Pharynx without injection  Neck:   Supple         Lungs:     Clear to auscultation bilaterally   Chest Wall:    No tenderness or deformity    Heart:    Regular rate and rhythm         Abdomen:     Soft, non-tender, bowel sounds +, liver is palpable 3 cm below right costal margin   Spleen is not palpable                Extremities:   Extremities no cyanosis or edema         Skin:   no rash or icterus  Lymph nodes:   Cervical, supraclavicular, and axillary nodes normal   Neurologic:   CNII-XII intact, normal strength, sensation and reflexes     Throughout             Breast exam:   Lumpectomy scar at 6 o'clock position in her left breast with no palpable abnormalities   Right breast exam is negative  ROS: Review of Systems   All other systems reviewed and are negative  Imaging: No results found        Labs:   Lab Results   Component Value Date    WBC 3 66 (L) 05/12/2020    HGB 13 0 05/12/2020    HCT 38 7 05/12/2020     (H) 05/12/2020     05/12/2020     Lab Results   Component Value Date    K 3 7 03/20/2020     03/20/2020    CO2 26 03/20/2020    BUN 9 03/20/2020    CREATININE 0 20 (L) 03/20/2020    GLUF 104 (H) 03/20/2020    CALCIUM 9 1 03/20/2020    AST 37 03/20/2020    ALT 25 03/20/2020 ALKPHOS 62 03/20/2020    EGFR 140 03/20/2020         Current Medications: Reviewed  Allergies: Reviewed  PMH/FH/SH:  Reviewed      Vital Sign:    Body surface area is 1 74 meters squared      Wt Readings from Last 3 Encounters:   08/27/20 76 2 kg (168 lb)   08/14/20 75 4 kg (166 lb 5 4 oz)   08/07/20 76 2 kg (167 lb 14 4 oz)        Temp Readings from Last 3 Encounters:   08/27/20 97 7 °F (36 5 °C) (Tympanic Core)   08/14/20 (!) 96 8 °F (36 °C) (Tympanic)   08/07/20 (!) 97 3 °F (36 3 °C)        BP Readings from Last 3 Encounters:   08/27/20 138/80   08/14/20 147/87   08/07/20 146/99         Pulse Readings from Last 3 Encounters:   08/27/20 102   08/14/20 91   07/24/20 89     @LASTSAO2(3)@

## 2020-09-04 ENCOUNTER — HOSPITAL ENCOUNTER (OUTPATIENT)
Dept: INFUSION CENTER | Facility: CLINIC | Age: 65
Discharge: HOME | End: 2020-09-04
Payer: COMMERCIAL

## 2020-09-04 VITALS
SYSTOLIC BLOOD PRESSURE: 149 MMHG | HEART RATE: 93 BPM | WEIGHT: 167.33 LBS | DIASTOLIC BLOOD PRESSURE: 89 MMHG | RESPIRATION RATE: 18 BRPM | BODY MASS INDEX: 32.14 KG/M2 | TEMPERATURE: 97.6 F

## 2020-09-04 DIAGNOSIS — Z17.0 MALIGNANT NEOPLASM OF LOWER-OUTER QUADRANT OF LEFT BREAST OF FEMALE, ESTROGEN RECEPTOR POSITIVE (HCC): ICD-10-CM

## 2020-09-04 DIAGNOSIS — D70.1 CHEMOTHERAPY INDUCED NEUTROPENIA (HCC): Primary | ICD-10-CM

## 2020-09-04 DIAGNOSIS — C50.512 MALIGNANT NEOPLASM OF LOWER-OUTER QUADRANT OF LEFT BREAST OF FEMALE, ESTROGEN RECEPTOR POSITIVE (HCC): ICD-10-CM

## 2020-09-04 DIAGNOSIS — T45.1X5A CHEMOTHERAPY INDUCED NEUTROPENIA (HCC): Primary | ICD-10-CM

## 2020-09-04 PROCEDURE — 96413 CHEMO IV INFUSION 1 HR: CPT

## 2020-09-04 RX ORDER — SODIUM CHLORIDE 9 MG/ML
20 INJECTION, SOLUTION INTRAVENOUS ONCE
Status: COMPLETED | OUTPATIENT
Start: 2020-09-04 | End: 2020-09-04

## 2020-09-04 RX ADMIN — SODIUM CHLORIDE 20 ML/HR: 0.9 INJECTION, SOLUTION INTRAVENOUS at 10:05

## 2020-09-04 RX ADMIN — TRASTUZUMAB 450 MG: 150 INJECTION, POWDER, LYOPHILIZED, FOR SOLUTION INTRAVENOUS at 10:06

## 2020-09-04 NOTE — PLAN OF CARE

## 2020-09-28 ENCOUNTER — HOSPITAL ENCOUNTER (OUTPATIENT)
Dept: INFUSION CENTER | Facility: CLINIC | Age: 65
Discharge: HOME | End: 2020-09-28
Payer: COMMERCIAL

## 2020-09-28 VITALS
DIASTOLIC BLOOD PRESSURE: 97 MMHG | HEART RATE: 99 BPM | SYSTOLIC BLOOD PRESSURE: 155 MMHG | TEMPERATURE: 97.3 F | RESPIRATION RATE: 18 BRPM | WEIGHT: 177.03 LBS | BODY MASS INDEX: 34 KG/M2

## 2020-09-28 DIAGNOSIS — C50.512 MALIGNANT NEOPLASM OF LOWER-OUTER QUADRANT OF LEFT BREAST OF FEMALE, ESTROGEN RECEPTOR POSITIVE (HCC): ICD-10-CM

## 2020-09-28 DIAGNOSIS — T45.1X5A CHEMOTHERAPY INDUCED NEUTROPENIA (HCC): Primary | ICD-10-CM

## 2020-09-28 DIAGNOSIS — Z17.0 MALIGNANT NEOPLASM OF LOWER-OUTER QUADRANT OF LEFT BREAST OF FEMALE, ESTROGEN RECEPTOR POSITIVE (HCC): ICD-10-CM

## 2020-09-28 DIAGNOSIS — D70.1 CHEMOTHERAPY INDUCED NEUTROPENIA (HCC): Primary | ICD-10-CM

## 2020-09-28 PROCEDURE — 96413 CHEMO IV INFUSION 1 HR: CPT

## 2020-09-28 RX ORDER — SODIUM CHLORIDE 9 MG/ML
20 INJECTION, SOLUTION INTRAVENOUS ONCE
Status: COMPLETED | OUTPATIENT
Start: 2020-09-28 | End: 2020-09-28

## 2020-09-28 RX ADMIN — TRASTUZUMAB 450 MG: 150 INJECTION, POWDER, LYOPHILIZED, FOR SOLUTION INTRAVENOUS at 09:28

## 2020-09-28 RX ADMIN — SODIUM CHLORIDE 20 ML/HR: 0.9 INJECTION, SOLUTION INTRAVENOUS at 09:07

## 2020-10-14 ENCOUNTER — OFFICE VISIT (OUTPATIENT)
Dept: FAMILY MEDICINE CLINIC | Facility: CLINIC | Age: 65
End: 2020-10-14
Payer: COMMERCIAL

## 2020-10-14 VITALS
HEIGHT: 61 IN | BODY MASS INDEX: 31.91 KG/M2 | SYSTOLIC BLOOD PRESSURE: 132 MMHG | WEIGHT: 169 LBS | HEART RATE: 78 BPM | DIASTOLIC BLOOD PRESSURE: 84 MMHG

## 2020-10-14 DIAGNOSIS — Z17.0 MALIGNANT NEOPLASM OF LOWER-OUTER QUADRANT OF LEFT BREAST OF FEMALE, ESTROGEN RECEPTOR POSITIVE (HCC): ICD-10-CM

## 2020-10-14 DIAGNOSIS — I10 ESSENTIAL HYPERTENSION: Primary | ICD-10-CM

## 2020-10-14 DIAGNOSIS — Z12.11 COLON CANCER SCREENING: ICD-10-CM

## 2020-10-14 DIAGNOSIS — E78.2 MIXED HYPERLIPIDEMIA: ICD-10-CM

## 2020-10-14 DIAGNOSIS — Z23 ENCOUNTER FOR IMMUNIZATION: ICD-10-CM

## 2020-10-14 DIAGNOSIS — C50.512 MALIGNANT NEOPLASM OF LOWER-OUTER QUADRANT OF LEFT BREAST OF FEMALE, ESTROGEN RECEPTOR POSITIVE (HCC): ICD-10-CM

## 2020-10-14 PROCEDURE — 90682 RIV4 VACC RECOMBINANT DNA IM: CPT

## 2020-10-14 PROCEDURE — 99214 OFFICE O/P EST MOD 30 MIN: CPT | Performed by: FAMILY MEDICINE

## 2020-10-14 PROCEDURE — 90471 IMMUNIZATION ADMIN: CPT

## 2020-10-16 ENCOUNTER — HOSPITAL ENCOUNTER (OUTPATIENT)
Dept: INFUSION CENTER | Facility: CLINIC | Age: 65
Discharge: HOME | End: 2020-10-16
Payer: COMMERCIAL

## 2020-10-16 VITALS
HEART RATE: 93 BPM | DIASTOLIC BLOOD PRESSURE: 93 MMHG | SYSTOLIC BLOOD PRESSURE: 152 MMHG | TEMPERATURE: 97.4 F | BODY MASS INDEX: 32.82 KG/M2 | RESPIRATION RATE: 16 BRPM | WEIGHT: 170.86 LBS

## 2020-10-16 DIAGNOSIS — Z17.0 MALIGNANT NEOPLASM OF LOWER-OUTER QUADRANT OF LEFT BREAST OF FEMALE, ESTROGEN RECEPTOR POSITIVE (HCC): ICD-10-CM

## 2020-10-16 DIAGNOSIS — D70.1 CHEMOTHERAPY INDUCED NEUTROPENIA (HCC): Primary | ICD-10-CM

## 2020-10-16 DIAGNOSIS — C50.512 MALIGNANT NEOPLASM OF LOWER-OUTER QUADRANT OF LEFT BREAST OF FEMALE, ESTROGEN RECEPTOR POSITIVE (HCC): ICD-10-CM

## 2020-10-16 DIAGNOSIS — T45.1X5A CHEMOTHERAPY INDUCED NEUTROPENIA (HCC): Primary | ICD-10-CM

## 2020-10-16 PROCEDURE — 96413 CHEMO IV INFUSION 1 HR: CPT

## 2020-10-16 RX ORDER — SODIUM CHLORIDE 9 MG/ML
20 INJECTION, SOLUTION INTRAVENOUS ONCE
Status: COMPLETED | OUTPATIENT
Start: 2020-10-16 | End: 2020-10-16

## 2020-10-16 RX ADMIN — SODIUM CHLORIDE 20 ML/HR: 0.9 INJECTION, SOLUTION INTRAVENOUS at 09:19

## 2020-10-16 RX ADMIN — TRASTUZUMAB 450 MG: 150 INJECTION, POWDER, LYOPHILIZED, FOR SOLUTION INTRAVENOUS at 09:41

## 2020-10-28 DIAGNOSIS — E78.2 MIXED HYPERLIPIDEMIA: ICD-10-CM

## 2020-10-28 DIAGNOSIS — I10 ESSENTIAL HYPERTENSION: Primary | ICD-10-CM

## 2020-10-29 RX ORDER — OLMESARTAN MEDOXOMIL 40 MG/1
40 TABLET ORAL DAILY
Qty: 90 TABLET | Refills: 1 | Status: SHIPPED | OUTPATIENT
Start: 2020-10-29 | End: 2021-06-16 | Stop reason: DRUGHIGH

## 2020-10-29 RX ORDER — ROSUVASTATIN CALCIUM 5 MG/1
5 TABLET, COATED ORAL WEEKLY
Qty: 30 TABLET | Refills: 1 | Status: SHIPPED | OUTPATIENT
Start: 2020-10-29 | End: 2021-01-01 | Stop reason: SDUPTHER

## 2020-11-02 ENCOUNTER — HOSPITAL ENCOUNTER (OUTPATIENT)
Dept: MAMMOGRAPHY | Facility: CLINIC | Age: 65
Discharge: HOME | End: 2020-11-02
Payer: COMMERCIAL

## 2020-11-02 VITALS — BODY MASS INDEX: 32.1 KG/M2 | TEMPERATURE: 96 F | HEIGHT: 61 IN | WEIGHT: 170 LBS

## 2020-11-02 DIAGNOSIS — C50.512 MALIGNANT NEOPLASM OF LOWER-OUTER QUADRANT OF LEFT BREAST OF FEMALE, ESTROGEN RECEPTOR POSITIVE (HCC): ICD-10-CM

## 2020-11-02 DIAGNOSIS — Z17.0 MALIGNANT NEOPLASM OF LOWER-OUTER QUADRANT OF LEFT BREAST OF FEMALE, ESTROGEN RECEPTOR POSITIVE (HCC): ICD-10-CM

## 2020-11-02 PROCEDURE — 77066 DX MAMMO INCL CAD BI: CPT

## 2020-11-02 PROCEDURE — G0279 TOMOSYNTHESIS, MAMMO: HCPCS

## 2020-11-03 ENCOUNTER — HOSPITAL ENCOUNTER (OUTPATIENT)
Dept: NON INVASIVE DIAGNOSTICS | Facility: HOSPITAL | Age: 65
Discharge: HOME | End: 2020-11-03
Attending: INTERNAL MEDICINE
Payer: COMMERCIAL

## 2020-11-03 DIAGNOSIS — Z17.0 MALIGNANT NEOPLASM OF LOWER-OUTER QUADRANT OF LEFT BREAST OF FEMALE, ESTROGEN RECEPTOR POSITIVE (HCC): ICD-10-CM

## 2020-11-03 DIAGNOSIS — C50.512 MALIGNANT NEOPLASM OF LOWER-OUTER QUADRANT OF LEFT BREAST OF FEMALE, ESTROGEN RECEPTOR POSITIVE (HCC): ICD-10-CM

## 2020-11-03 DIAGNOSIS — D70.1 CHEMOTHERAPY INDUCED NEUTROPENIA (HCC): ICD-10-CM

## 2020-11-03 DIAGNOSIS — T45.1X5A CHEMOTHERAPY INDUCED NEUTROPENIA (HCC): ICD-10-CM

## 2020-11-03 PROCEDURE — 93308 TTE F-UP OR LMTD: CPT | Performed by: INTERNAL MEDICINE

## 2020-11-03 PROCEDURE — 93321 DOPPLER ECHO F-UP/LMTD STD: CPT | Performed by: INTERNAL MEDICINE

## 2020-11-03 PROCEDURE — 93308 TTE F-UP OR LMTD: CPT

## 2020-11-03 PROCEDURE — 93325 DOPPLER ECHO COLOR FLOW MAPG: CPT | Performed by: INTERNAL MEDICINE

## 2020-11-03 RX ADMIN — PERFLUTREN 0.8 ML/MIN: 6.52 INJECTION, SUSPENSION INTRAVENOUS at 12:09

## 2020-11-06 ENCOUNTER — HOSPITAL ENCOUNTER (OUTPATIENT)
Dept: INFUSION CENTER | Facility: CLINIC | Age: 65
Discharge: HOME | End: 2020-11-06
Payer: COMMERCIAL

## 2020-11-06 VITALS
DIASTOLIC BLOOD PRESSURE: 95 MMHG | WEIGHT: 172.18 LBS | SYSTOLIC BLOOD PRESSURE: 147 MMHG | TEMPERATURE: 96.2 F | HEART RATE: 93 BPM | BODY MASS INDEX: 33.07 KG/M2

## 2020-11-06 DIAGNOSIS — T45.1X5A CHEMOTHERAPY INDUCED NEUTROPENIA (HCC): Primary | ICD-10-CM

## 2020-11-06 DIAGNOSIS — D70.1 CHEMOTHERAPY INDUCED NEUTROPENIA (HCC): Primary | ICD-10-CM

## 2020-11-06 DIAGNOSIS — Z17.0 MALIGNANT NEOPLASM OF LOWER-OUTER QUADRANT OF LEFT BREAST OF FEMALE, ESTROGEN RECEPTOR POSITIVE (HCC): ICD-10-CM

## 2020-11-06 DIAGNOSIS — C50.512 MALIGNANT NEOPLASM OF LOWER-OUTER QUADRANT OF LEFT BREAST OF FEMALE, ESTROGEN RECEPTOR POSITIVE (HCC): ICD-10-CM

## 2020-11-06 PROCEDURE — 96413 CHEMO IV INFUSION 1 HR: CPT

## 2020-11-06 RX ORDER — SODIUM CHLORIDE 9 MG/ML
20 INJECTION, SOLUTION INTRAVENOUS ONCE
Status: COMPLETED | OUTPATIENT
Start: 2020-11-06 | End: 2020-11-06

## 2020-11-06 RX ADMIN — SODIUM CHLORIDE 20 ML/HR: 0.9 INJECTION, SOLUTION INTRAVENOUS at 08:52

## 2020-11-06 RX ADMIN — TRASTUZUMAB 450 MG: 150 INJECTION, POWDER, LYOPHILIZED, FOR SOLUTION INTRAVENOUS at 09:23

## 2020-11-18 DIAGNOSIS — Z95.828 PORT-A-CATH IN PLACE: ICD-10-CM

## 2020-11-18 RX ORDER — LIDOCAINE AND PRILOCAINE 25; 25 MG/G; MG/G
CREAM TOPICAL AS NEEDED
Qty: 30 G | Refills: 0 | Status: CANCELLED | OUTPATIENT
Start: 2020-11-18

## 2020-11-18 RX ORDER — LIDOCAINE AND PRILOCAINE 25; 25 MG/G; MG/G
CREAM TOPICAL AS NEEDED
Qty: 30 G | Refills: 1 | Status: SHIPPED | OUTPATIENT
Start: 2020-11-18 | End: 2020-11-19 | Stop reason: SDUPTHER

## 2020-11-19 DIAGNOSIS — Z95.828 PORT-A-CATH IN PLACE: ICD-10-CM

## 2020-11-19 RX ORDER — LIDOCAINE AND PRILOCAINE 25; 25 MG/G; MG/G
CREAM TOPICAL AS NEEDED
Qty: 30 G | Refills: 1 | Status: SHIPPED | OUTPATIENT
Start: 2020-11-19 | End: 2021-06-16

## 2020-11-27 ENCOUNTER — HOSPITAL ENCOUNTER (OUTPATIENT)
Dept: INFUSION CENTER | Facility: CLINIC | Age: 65
Discharge: HOME | End: 2020-11-27
Payer: COMMERCIAL

## 2020-11-27 VITALS
TEMPERATURE: 97.9 F | RESPIRATION RATE: 18 BRPM | DIASTOLIC BLOOD PRESSURE: 88 MMHG | HEART RATE: 96 BPM | BODY MASS INDEX: 33.67 KG/M2 | WEIGHT: 175.27 LBS | SYSTOLIC BLOOD PRESSURE: 160 MMHG

## 2020-11-27 DIAGNOSIS — Z17.0 MALIGNANT NEOPLASM OF LOWER-OUTER QUADRANT OF LEFT BREAST OF FEMALE, ESTROGEN RECEPTOR POSITIVE (HCC): ICD-10-CM

## 2020-11-27 DIAGNOSIS — T45.1X5A CHEMOTHERAPY INDUCED NEUTROPENIA (HCC): Primary | ICD-10-CM

## 2020-11-27 DIAGNOSIS — D70.1 CHEMOTHERAPY INDUCED NEUTROPENIA (HCC): Primary | ICD-10-CM

## 2020-11-27 DIAGNOSIS — C50.512 MALIGNANT NEOPLASM OF LOWER-OUTER QUADRANT OF LEFT BREAST OF FEMALE, ESTROGEN RECEPTOR POSITIVE (HCC): ICD-10-CM

## 2020-11-27 PROCEDURE — 96413 CHEMO IV INFUSION 1 HR: CPT

## 2020-11-27 RX ORDER — SODIUM CHLORIDE 9 MG/ML
20 INJECTION, SOLUTION INTRAVENOUS ONCE
Status: COMPLETED | OUTPATIENT
Start: 2020-11-27 | End: 2020-11-27

## 2020-11-27 RX ADMIN — TRASTUZUMAB 450 MG: 150 INJECTION, POWDER, LYOPHILIZED, FOR SOLUTION INTRAVENOUS at 09:06

## 2020-11-27 RX ADMIN — SODIUM CHLORIDE 20 ML/HR: 0.9 INJECTION, SOLUTION INTRAVENOUS at 08:55

## 2020-11-30 ENCOUNTER — OFFICE VISIT (OUTPATIENT)
Dept: SURGICAL ONCOLOGY | Facility: CLINIC | Age: 65
End: 2020-11-30
Payer: COMMERCIAL

## 2020-11-30 VITALS
WEIGHT: 174 LBS | SYSTOLIC BLOOD PRESSURE: 140 MMHG | HEART RATE: 72 BPM | RESPIRATION RATE: 16 BRPM | BODY MASS INDEX: 32.85 KG/M2 | DIASTOLIC BLOOD PRESSURE: 80 MMHG | HEIGHT: 61 IN | TEMPERATURE: 98.1 F

## 2020-11-30 DIAGNOSIS — C50.512 MALIGNANT NEOPLASM OF LOWER-OUTER QUADRANT OF LEFT BREAST OF FEMALE, ESTROGEN RECEPTOR POSITIVE (HCC): Primary | ICD-10-CM

## 2020-11-30 DIAGNOSIS — Z79.811 USE OF ANASTROZOLE: ICD-10-CM

## 2020-11-30 DIAGNOSIS — Z17.0 MALIGNANT NEOPLASM OF LOWER-OUTER QUADRANT OF LEFT BREAST OF FEMALE, ESTROGEN RECEPTOR POSITIVE (HCC): Primary | ICD-10-CM

## 2020-11-30 PROCEDURE — 99214 OFFICE O/P EST MOD 30 MIN: CPT | Performed by: NURSE PRACTITIONER

## 2020-12-04 ENCOUNTER — OFFICE VISIT (OUTPATIENT)
Dept: HEMATOLOGY ONCOLOGY | Facility: CLINIC | Age: 65
End: 2020-12-04
Payer: COMMERCIAL

## 2020-12-04 VITALS
WEIGHT: 172 LBS | TEMPERATURE: 96.3 F | HEART RATE: 105 BPM | SYSTOLIC BLOOD PRESSURE: 142 MMHG | HEIGHT: 60 IN | DIASTOLIC BLOOD PRESSURE: 98 MMHG | OXYGEN SATURATION: 97 % | BODY MASS INDEX: 33.77 KG/M2 | RESPIRATION RATE: 18 BRPM

## 2020-12-04 DIAGNOSIS — C50.512 MALIGNANT NEOPLASM OF LOWER-OUTER QUADRANT OF LEFT BREAST OF FEMALE, ESTROGEN RECEPTOR POSITIVE (HCC): Primary | ICD-10-CM

## 2020-12-04 DIAGNOSIS — Z17.0 MALIGNANT NEOPLASM OF LOWER-OUTER QUADRANT OF LEFT BREAST OF FEMALE, ESTROGEN RECEPTOR POSITIVE (HCC): Primary | ICD-10-CM

## 2020-12-04 PROCEDURE — 99214 OFFICE O/P EST MOD 30 MIN: CPT | Performed by: INTERNAL MEDICINE

## 2020-12-11 ENCOUNTER — TELEPHONE (OUTPATIENT)
Dept: RADIOLOGY | Facility: HOSPITAL | Age: 65
End: 2020-12-11

## 2020-12-11 RX ORDER — SODIUM CHLORIDE 9 MG/ML
50 INJECTION, SOLUTION INTRAVENOUS ONCE
Status: CANCELLED | OUTPATIENT
Start: 2020-12-11

## 2020-12-21 ENCOUNTER — TELEPHONE (OUTPATIENT)
Dept: RADIOLOGY | Facility: HOSPITAL | Age: 65
End: 2020-12-21

## 2020-12-21 NOTE — PRE-PROCEDURE INSTRUCTIONS
Spoke with Ceci Vegas,  Date, time, locations gone over with patient  Patient verbalized understanding of procedure, NPO status and that she needs to wear mask while here  Patient is also aware that she needs to have a ride home  COVID screen negative

## 2020-12-22 ENCOUNTER — TELEPHONE (OUTPATIENT)
Dept: HEMATOLOGY ONCOLOGY | Facility: CLINIC | Age: 65
End: 2020-12-22

## 2020-12-28 ENCOUNTER — TELEPHONE (OUTPATIENT)
Dept: INPATIENT UNIT | Facility: HOSPITAL | Age: 65
End: 2020-12-28

## 2020-12-29 ENCOUNTER — HOSPITAL ENCOUNTER (OUTPATIENT)
Dept: RADIOLOGY | Facility: HOSPITAL | Age: 65
Discharge: HOME | End: 2020-12-29
Attending: INTERNAL MEDICINE
Payer: COMMERCIAL

## 2020-12-29 VITALS
OXYGEN SATURATION: 97 % | BODY MASS INDEX: 32.47 KG/M2 | WEIGHT: 172 LBS | RESPIRATION RATE: 20 BRPM | HEART RATE: 86 BPM | SYSTOLIC BLOOD PRESSURE: 169 MMHG | TEMPERATURE: 97.3 F | HEIGHT: 61 IN | DIASTOLIC BLOOD PRESSURE: 88 MMHG

## 2020-12-29 DIAGNOSIS — C50.512 MALIGNANT NEOPLASM OF LOWER-OUTER QUADRANT OF LEFT FEMALE BREAST (HCC): ICD-10-CM

## 2020-12-29 LAB
BASOPHILS # BLD AUTO: 0.04 THOUSANDS/ΜL (ref 0–0.1)
BASOPHILS NFR BLD AUTO: 1 % (ref 0–1)
EOSINOPHIL # BLD AUTO: 0.1 THOUSAND/ΜL (ref 0–0.61)
EOSINOPHIL NFR BLD AUTO: 2 % (ref 0–6)
ERYTHROCYTE [DISTWIDTH] IN BLOOD BY AUTOMATED COUNT: 13.1 % (ref 11.6–15.1)
HCT VFR BLD AUTO: 41.8 % (ref 34.8–46.1)
HGB BLD-MCNC: 13.8 G/DL (ref 11.5–15.4)
IMM GRANULOCYTES # BLD AUTO: 0.01 THOUSAND/UL (ref 0–0.2)
IMM GRANULOCYTES NFR BLD AUTO: 0 % (ref 0–2)
INR PPP: 0.91 (ref 0.84–1.19)
LYMPHOCYTES # BLD AUTO: 1.04 THOUSANDS/ΜL (ref 0.6–4.47)
LYMPHOCYTES NFR BLD AUTO: 20 % (ref 14–44)
MCH RBC QN AUTO: 32.7 PG (ref 26.8–34.3)
MCHC RBC AUTO-ENTMCNC: 33 G/DL (ref 31.4–37.4)
MCV RBC AUTO: 99 FL (ref 82–98)
MONOCYTES # BLD AUTO: 0.49 THOUSAND/ΜL (ref 0.17–1.22)
MONOCYTES NFR BLD AUTO: 10 % (ref 4–12)
NEUTROPHILS # BLD AUTO: 3.42 THOUSANDS/ΜL (ref 1.85–7.62)
NEUTS SEG NFR BLD AUTO: 67 % (ref 43–75)
NRBC BLD AUTO-RTO: 0 /100 WBCS
PLATELET # BLD AUTO: 184 THOUSANDS/UL (ref 149–390)
PMV BLD AUTO: 8.9 FL (ref 8.9–12.7)
PROTHROMBIN TIME: 12.3 SECONDS (ref 11.6–14.5)
RBC # BLD AUTO: 4.22 MILLION/UL (ref 3.81–5.12)
WBC # BLD AUTO: 5.1 THOUSAND/UL (ref 4.31–10.16)

## 2020-12-29 PROCEDURE — 85025 COMPLETE CBC W/AUTO DIFF WBC: CPT | Performed by: RADIOLOGY

## 2020-12-29 PROCEDURE — 85610 PROTHROMBIN TIME: CPT | Performed by: RADIOLOGY

## 2020-12-29 PROCEDURE — 99152 MOD SED SAME PHYS/QHP 5/>YRS: CPT

## 2020-12-29 PROCEDURE — 36590 REMOVAL TUNNELED CV CATH: CPT | Performed by: RADIOLOGY

## 2020-12-29 PROCEDURE — 99152 MOD SED SAME PHYS/QHP 5/>YRS: CPT | Performed by: RADIOLOGY

## 2020-12-29 PROCEDURE — 36590 REMOVAL TUNNELED CV CATH: CPT

## 2020-12-29 PROCEDURE — 99153 MOD SED SAME PHYS/QHP EA: CPT

## 2020-12-29 RX ORDER — LIDOCAINE HYDROCHLORIDE AND EPINEPHRINE 10; 10 MG/ML; UG/ML
INJECTION, SOLUTION INFILTRATION; PERINEURAL CODE/TRAUMA/SEDATION MEDICATION
Status: COMPLETED | OUTPATIENT
Start: 2020-12-29 | End: 2020-12-29

## 2020-12-29 RX ORDER — MIDAZOLAM HYDROCHLORIDE 2 MG/2ML
INJECTION, SOLUTION INTRAMUSCULAR; INTRAVENOUS CODE/TRAUMA/SEDATION MEDICATION
Status: COMPLETED | OUTPATIENT
Start: 2020-12-29 | End: 2020-12-29

## 2020-12-29 RX ORDER — SODIUM CHLORIDE 9 MG/ML
50 INJECTION, SOLUTION INTRAVENOUS ONCE
Status: COMPLETED | OUTPATIENT
Start: 2020-12-29 | End: 2020-12-29

## 2020-12-29 RX ORDER — FENTANYL CITRATE 50 UG/ML
INJECTION, SOLUTION INTRAMUSCULAR; INTRAVENOUS CODE/TRAUMA/SEDATION MEDICATION
Status: COMPLETED | OUTPATIENT
Start: 2020-12-29 | End: 2020-12-29

## 2020-12-29 RX ADMIN — FENTANYL CITRATE 50 MCG: 50 INJECTION INTRAMUSCULAR; INTRAVENOUS at 10:27

## 2020-12-29 RX ADMIN — LIDOCAINE HYDROCHLORIDE,EPINEPHRINE BITARTRATE 10 ML: 10; .01 INJECTION, SOLUTION INFILTRATION; PERINEURAL at 10:39

## 2020-12-29 RX ADMIN — MIDAZOLAM 1 MG: 1 INJECTION INTRAMUSCULAR; INTRAVENOUS at 10:26

## 2020-12-29 RX ADMIN — SODIUM CHLORIDE 50 ML/HR: 0.9 INJECTION, SOLUTION INTRAVENOUS at 09:55

## 2020-12-29 RX ADMIN — MIDAZOLAM 1 MG: 1 INJECTION INTRAMUSCULAR; INTRAVENOUS at 10:34

## 2020-12-29 RX ADMIN — FENTANYL CITRATE 50 MCG: 50 INJECTION INTRAMUSCULAR; INTRAVENOUS at 10:34

## 2021-01-01 DIAGNOSIS — E78.2 MIXED HYPERLIPIDEMIA: ICD-10-CM

## 2021-01-04 RX ORDER — ROSUVASTATIN CALCIUM 5 MG/1
5 TABLET, COATED ORAL WEEKLY
Qty: 30 TABLET | Refills: 0 | Status: SHIPPED | OUTPATIENT
Start: 2021-01-04 | End: 2021-06-16 | Stop reason: SDUPTHER

## 2021-02-08 DIAGNOSIS — D70.1 CHEMOTHERAPY INDUCED NEUTROPENIA (HCC): ICD-10-CM

## 2021-02-08 DIAGNOSIS — Z17.0 MALIGNANT NEOPLASM OF LOWER-OUTER QUADRANT OF LEFT BREAST OF FEMALE, ESTROGEN RECEPTOR POSITIVE (HCC): ICD-10-CM

## 2021-02-08 DIAGNOSIS — T45.1X5A CHEMOTHERAPY INDUCED NEUTROPENIA (HCC): ICD-10-CM

## 2021-02-08 DIAGNOSIS — C50.512 MALIGNANT NEOPLASM OF LOWER-OUTER QUADRANT OF LEFT BREAST OF FEMALE, ESTROGEN RECEPTOR POSITIVE (HCC): ICD-10-CM

## 2021-02-08 RX ORDER — ANASTROZOLE 1 MG/1
1 TABLET ORAL DAILY
Qty: 90 TABLET | Refills: 1 | Status: SHIPPED | OUTPATIENT
Start: 2021-02-08 | End: 2021-05-28 | Stop reason: SDUPTHER

## 2021-03-10 DIAGNOSIS — Z23 ENCOUNTER FOR IMMUNIZATION: ICD-10-CM

## 2021-05-28 DIAGNOSIS — C50.512 MALIGNANT NEOPLASM OF LOWER-OUTER QUADRANT OF LEFT BREAST OF FEMALE, ESTROGEN RECEPTOR POSITIVE (HCC): ICD-10-CM

## 2021-05-28 DIAGNOSIS — T45.1X5A CHEMOTHERAPY INDUCED NEUTROPENIA (HCC): ICD-10-CM

## 2021-05-28 DIAGNOSIS — Z17.0 MALIGNANT NEOPLASM OF LOWER-OUTER QUADRANT OF LEFT BREAST OF FEMALE, ESTROGEN RECEPTOR POSITIVE (HCC): ICD-10-CM

## 2021-05-28 DIAGNOSIS — D70.1 CHEMOTHERAPY INDUCED NEUTROPENIA (HCC): ICD-10-CM

## 2021-05-28 DIAGNOSIS — I10 ESSENTIAL HYPERTENSION: ICD-10-CM

## 2021-05-28 DIAGNOSIS — E78.2 MIXED HYPERLIPIDEMIA: ICD-10-CM

## 2021-05-28 RX ORDER — ANASTROZOLE 1 MG/1
1 TABLET ORAL DAILY
Qty: 90 TABLET | Refills: 0 | Status: CANCELLED | OUTPATIENT
Start: 2021-05-28

## 2021-05-28 RX ORDER — ROSUVASTATIN CALCIUM 5 MG/1
5 TABLET, COATED ORAL WEEKLY
Qty: 30 TABLET | Refills: 0 | Status: CANCELLED | OUTPATIENT
Start: 2021-05-28

## 2021-05-28 RX ORDER — ANASTROZOLE 1 MG/1
1 TABLET ORAL DAILY
Qty: 90 TABLET | Refills: 1 | Status: SHIPPED | OUTPATIENT
Start: 2021-05-28 | End: 2021-10-04 | Stop reason: SDUPTHER

## 2021-05-28 RX ORDER — OLMESARTAN MEDOXOMIL 40 MG/1
40 TABLET ORAL DAILY
Qty: 90 TABLET | Refills: 0 | Status: CANCELLED | OUTPATIENT
Start: 2021-05-28

## 2021-06-04 ENCOUNTER — OFFICE VISIT (OUTPATIENT)
Dept: HEMATOLOGY ONCOLOGY | Facility: CLINIC | Age: 66
End: 2021-06-04
Payer: COMMERCIAL

## 2021-06-04 ENCOUNTER — OFFICE VISIT (OUTPATIENT)
Dept: SURGICAL ONCOLOGY | Facility: CLINIC | Age: 66
End: 2021-06-04
Payer: COMMERCIAL

## 2021-06-04 VITALS
DIASTOLIC BLOOD PRESSURE: 84 MMHG | WEIGHT: 182 LBS | HEART RATE: 102 BPM | SYSTOLIC BLOOD PRESSURE: 150 MMHG | TEMPERATURE: 97.2 F | BODY MASS INDEX: 35.73 KG/M2 | HEIGHT: 60 IN

## 2021-06-04 VITALS
DIASTOLIC BLOOD PRESSURE: 100 MMHG | WEIGHT: 178 LBS | TEMPERATURE: 97.6 F | BODY MASS INDEX: 34.95 KG/M2 | RESPIRATION RATE: 18 BRPM | OXYGEN SATURATION: 98 % | SYSTOLIC BLOOD PRESSURE: 148 MMHG | HEART RATE: 107 BPM | HEIGHT: 60 IN

## 2021-06-04 DIAGNOSIS — Z17.0 MALIGNANT NEOPLASM OF LOWER-OUTER QUADRANT OF LEFT BREAST OF FEMALE, ESTROGEN RECEPTOR POSITIVE (HCC): Primary | ICD-10-CM

## 2021-06-04 DIAGNOSIS — C50.512 MALIGNANT NEOPLASM OF LOWER-OUTER QUADRANT OF LEFT BREAST OF FEMALE, ESTROGEN RECEPTOR POSITIVE (HCC): Primary | ICD-10-CM

## 2021-06-04 DIAGNOSIS — Z79.811 USE OF ANASTROZOLE: ICD-10-CM

## 2021-06-04 PROCEDURE — 99214 OFFICE O/P EST MOD 30 MIN: CPT | Performed by: INTERNAL MEDICINE

## 2021-06-04 PROCEDURE — 99214 OFFICE O/P EST MOD 30 MIN: CPT | Performed by: NURSE PRACTITIONER

## 2021-06-04 NOTE — PROGRESS NOTES
Surgical Oncology Follow Up       [address] [facility] [place]  [facility] [id number]    Murphy Booth  1955  [id number]  [facility] [place]  [address] [place] [address]    Chief Complaint   Patient presents with    Follow-up       Assessment/Plan:  1  Malignant neoplasm of lower-outer quadrant of left breast of female, estrogen receptor positive ([facility] [address] )  - 6 mo f/u visit  - Mammo diagnostic bilateral w 3d & cad; Future    2  Use of anastrozole  - Continue use per medical oncology      Discussion/Summary: Patient is a 71-year-old female who presents today for a six-month follow-up visit for left breast cancer diagnosed in September of 2019  Her pathology revealed invasive ductal carcinoma, ER 70%, CA negative, HER2 positive   She underwent genetic testing which was negative   She underwent a left lumpectomy and sentinel node biopsy by Dr Liv Smith Crew completed adjuvant chemotherapy, Herceptin monotherapy and adjuvant RT  She is currently taking anastrozole and tolerating this well  She had a bilateral 3D diagnostic mammogram on November 2, 2020 which was BI-RADS 2, category 2 density  She offers no new complaints today and there are no concerns on today's exam   I will order her bilateral mammogram for November and plan to see the patient back again in 6 months or sooner should the need arise  She was instructed to call with any new concerns or symptoms prior to that time  All of her questions were answered today        History of Present Illness:     Oncology History   Malignant neoplasm of lower-outer quadrant of left breast of female, estrogen receptor positive ([facility] [address] )   9/17/2019 Biopsy    Left breast ultrasound-guided biospy  A  5 o'clock, 7 cm from nipple  Invasive mammary carcinoma of no special type (ductal, not otherwise specified)  Grade 1  ER 70  CA < 1  HER2 3+    B  1 o'clock, 5 cm from nipple  Benign, fibrocystic changes without atypia  Involving intraductal micropapilloma  No evidence of malignancy    Right breast stereotactic biopsy  C  Right breast without calcifications  Benign, fibrocystic changes without atypia  No evidence of malignancy    Concordant  Unifocal; measures 2 cm on US  Left axilla negative  Right breast clear  Carcinoma with butterfly shaped clip      10/10/2019 Genetic Testing    The following genes were evaluated: MOI, BRCA1, BRCA2, CDH1, CHEK2, PALB2, PTEN, STK11, TP53  Negative result   No pathogenic sequence variants or deletions/dupllications identified  [employer]     10/29/2019 Surgery    Left breast needle localized lumpectomy with sentinel lymph node biopsy  Invasive mammary carcinoma of no special type (ductal, not otherwise specified)  Grade 3  2 2 cm  Margins negative  0/1 Lymph Nodes  Anatomic/Prognostic Stage IIA     12/6/2019 - 12/17/2020 Chemotherapy    pegfilgrastim (NEULASTA) subcutaneous injection 6 mg, 6 mg, Subcutaneous, Once, 1 of 1 cycle  Administration: 6 mg (12/30/2019)  pegfilgrastim (NEULASTA ONPRO) subcutaneous injection kit 6 mg, 6 mg, Subcutaneous, Once, 6 of 6 cycles  Administration: 6 mg (12/6/2019), 6 mg (12/27/2019), 6 mg (1/17/2020), 6 mg (2/7/2020), 6 mg (2/28/2020), 6 mg (3/20/2020)  fosaprepitant (EMEND) 150 mg in sodium chloride 0 9 % 255 mL IVPB, 150 mg, Intravenous, Once, 6 of 6 cycles  Administration: 150 mg (12/6/2019), 150 mg (12/27/2019), 150 mg (1/17/2020), 150 mg (2/7/2020), 150 mg (2/28/2020), 150 mg (3/20/2020)  CARBOplatin (PARAPLATIN) 616 8 mg in sodium chloride 0 9 % 250 mL IVPB, 616 8 mg, Intravenous, Once, 6 of 6 cycles  Administration: 616 8 mg (12/6/2019), 616 8 mg (12/27/2019), 616 8 mg (1/17/2020), 616 8 mg (2/7/2020), 616 8 mg (2/28/2020), 616 8 mg (3/20/2020)  DOCEtaxel (TAXOTERE) 106 2 mg in sodium chloride 0 9 % 250 mL chemo infusion, 60 mg/m2 = 106 2 mg (80 % of original dose 75 mg/m2), Intravenous, Once, 6 of 6 cycles  Dose modification: 60 mg/m2 (original dose 75 mg/m2, Cycle 1, Reason: Dose Not Tolerated), 75 mg/m2 (original dose 75 mg/m2, Cycle 2, Reason: Other (Must fill in a comment), Comment: LFT normalized  )  Administration: 106 2 mg (12/6/2019), 132 8 mg (12/27/2019), 132 8 mg (1/17/2020), 132 8 mg (2/7/2020), 132 8 mg (2/28/2020), 132 8 mg (3/20/2020)  trastuzumab (HERCEPTIN) 600 mg in sodium chloride 0 9 % 250 mL chemo infusion, 624 mg, Intravenous, Once, 18 of 18 cycles  Administration: 600 mg (12/6/2019), 450 mg (12/27/2019), 450 mg (4/10/2020), 450 mg (1/17/2020), 450 mg (2/7/2020), 450 mg (2/28/2020), 450 mg (3/20/2020), 450 mg (5/1/2020), 450 mg (5/22/2020), 450 mg (6/12/2020), 450 mg (7/2/2020), 450 mg (7/24/2020), 450 mg (8/14/2020), 450 mg (9/4/2020), 450 mg (9/28/2020), 450 mg (10/16/2020), 450 mg (11/6/2020), 450 mg (11/27/2020)     5/4/2020 - 6/2/2020 Radiation    Plan ID Energy Fractions Dose per Fraction (cGy) Dose Correction (cGy) Total Dose Delivered (cGy) Elapsed Days   BH L BOOST 6X 5 / 5 200 0 1,000 6   BH L BREAST 6X 16 / 16 266 0 4,256 22               -Interval History:   Patient notices no new breast lumps, skin changes, nipple changes or discharge  Denies persistent headaches, back pain or bone pain, cough or shortness of breath, abdominal pain  She continues on anastrozole  Review of Systems:  Review of Systems   Constitutional: Negative for activity change, appetite change, chills, fatigue, fever and unexpected weight change  Respiratory: Negative for cough and shortness of breath  Cardiovascular: Negative for chest pain  Gastrointestinal: Negative for abdominal pain, constipation, diarrhea, nausea and vomiting  Musculoskeletal: Negative for arthralgias, back pain, gait problem and myalgias  Skin: Negative for color change and rash  Neurological: Negative for dizziness and headaches  Hematological: Negative for adenopathy     Psychiatric/Behavioral: Negative for agitation and confusion  All other systems reviewed and are negative  Patient Active Problem List   Diagnosis    Malignant neoplasm of lower-outer quadrant of left breast of female, estrogen receptor positive ([facility] [address] )    Chemotherapy induced neutropenia ([facility] [address] )    Port-A-Cath in place    Hypertension    Hyperlipidemia    Use of anastrozole     Past Medical History:   Diagnosis Date    Breast cancer ([facility] [address] ) 10/2019    grade 3 ER Positive MO negative Her 2 3+ disease    History of chemotherapy 03/2020    History of radiation therapy 04/2020    Hyperlipidemia     Hypertension     Vertigo     no medication     Past Surgical History:   Procedure Laterality Date    BREAST BIOPSY Left 09/17/2019    Left U/S BX    BREAST LUMPECTOMY Left 10/29/2019    Procedure: BREAST NEEDLE LOCALIZED LUMPECTOMY (NEEDLE LOC AT 0800); Surgeon: Dionicio Enamorado MD;  [facility];  Service: Surgical Oncology    FRACTURE SURGERY Left     surgery repair- elbow     IR PORT PLACEMENT  12/3/2019    IR PORT REMOVAL  12/29/2020    LYMPH NODE BIOPSY Left 10/29/2019    Procedure: SENTINEL LYMPH NODE BIOPSY; LYMPHATIC MAPPING WITH BLUE DYE AND RADIOACTIVE DYE (INJECT AT 0900 BY DR PALACIOS IN THE OR);   Surgeon: Dionicio Enamorado MD;  Location: [facility];  Service: Surgical Oncology    MAMMO NEEDLE LOCALIZATION LEFT (ALL INC) Left 10/29/2019    MAMMO STEREOTACTIC BREAST BIOPSY RIGHT (ALL INC) Right 9/17/2019    US GUIDANCE BREAST BIOPSY LEFT EACH ADDITIONAL Left 9/17/2019    US GUIDED BREAST BIOPSY LEFT COMPLETE Left 9/17/2019    WISDOM TOOTH EXTRACTION Bilateral      Family History   Problem Relation Age of Onset    No Known Problems Mother     Prostate cancer Father 79    Arthritis Father     No Known Problems Sister     No Known Problems Paternal Aunt     Nail disease Paternal Aunt     No Known Problems Paternal Aunt     No Known Problems Maternal Grandmother     No Known Problems Maternal Grandfather     No Known Problems Paternal Grandmother     No Known Problems Paternal Grandfather      Social History     Socioeconomic History    Marital status: /Civil Union     Spouse name: Not on file    Number of children: Not on file    Years of education: Not on file    Highest education level: Not on file   Occupational History    Not on file   Social Needs    Financial resource strain: Not on file    Food insecurity     Worry: Not on file     Inability: Not on file   [employer] needs     Medical: Not on file     Non-medical: Not on file   Tobacco Use    Smoking status: Former Smoker     Packs/day: 0 75     Years: 38 00     Pack years: 28 50     Quit date:      Years since quittin 4    Smokeless tobacco: Never Used   Substance and Sexual Activity    Alcohol use: Yes     Frequency: 2-3 times a week     Drinks per session: 3 or 4    Drug use: Never    Sexual activity: Yes     Partners: Female     Birth control/protection: None, Post-menopausal   Lifestyle    Physical activity     Days per week: Not on file     Minutes per session: Not on file    Stress: Not on file   Relationships    Social connections     Talks on phone: Not on file     Gets together: Not on file     Attends Spiritism service: Not on file     Active member of club or organization: Not on file     Attends meetings of clubs or organizations: Not on file     Relationship status: Not on file    Intimate partner violence     Fear of current or ex partner: Not on file     Emotionally abused: Not on file     Physically abused: Not on file     Forced sexual activity: Not on file   Other Topics Concern    Not on file   Social History Narrative    Not on file       Current Outpatient Medications:     anastrozole (ARIMIDEX) 1 mg tablet, Take 1 tablet (1 mg total) by mouth daily, Disp: 90 tablet, Rfl: 1    Cholecalciferol (VITAMIN D3) 1000 units CAPS, Take 1,000 capsules by mouth daily in the early morning , Disp: , Rfl:     Multiple Vitamins-Minerals (CENTRUM SILVER 50+WOMEN PO), Take 1 tablet by mouth daily in the early morning , Disp: , Rfl:     olmesartan (BENICAR) 40 mg tablet, Take 1 tablet (40 mg total) by mouth daily, Disp: 90 tablet, Rfl: 1    rosuvastatin (CRESTOR) 5 mg tablet, Take 1 tablet (5 mg total) by mouth once a week, Disp: 30 tablet, Rfl: 0    cyanocobalamin (VITAMIN B-12) 100 mcg tablet, Take 100 mcg by mouth daily after breakfast , Disp: , Rfl:     lidocaine-prilocaine (EMLA) cream, Apply topically as needed for mild pain, Disp: 30 g, Rfl: 1  Allergies   Allergen Reactions    Adhesive [Medical Tape]      Vitals:    06/04/21 1024   BP: 150/84   Pulse: 102   Temp: (!) 97 2 °F (36 2 °C)       Physical Exam  Vitals signs reviewed  Constitutional:       General: She is not in acute distress  Appearance: Normal appearance  She is well-developed  She is not diaphoretic  HENT:      Head: Normocephalic and atraumatic  Neck:      Musculoskeletal: Normal range of motion  Cardiovascular:      Rate and Rhythm: Normal rate and regular rhythm  Heart sounds: Normal heart sounds  Pulmonary:      Effort: Pulmonary effort is normal       Breath sounds: Normal breath sounds  Chest:      Breasts:         Right: No swelling, bleeding, inverted nipple, mass, nipple discharge, skin change or tenderness  Left: Swelling (breast edema, skin thickening s/p RT) and skin change (surgical scars- breast, axilla) present  No bleeding, inverted nipple, mass, nipple discharge or tenderness  Abdominal:      Palpations: Abdomen is soft  There is no mass  Tenderness: There is no abdominal tenderness  Musculoskeletal: Normal range of motion  Lymphadenopathy:      Upper Body:      Right upper body: No supraclavicular or axillary adenopathy  Left upper body: No supraclavicular or axillary adenopathy  Skin:     General: Skin is warm and dry  Findings: No rash     Neurological:      Mental Status: She is alert and oriented to person, place, and time  Psychiatric:         Speech: Speech normal            Advance Care Planning/Advance Directives:  Discussed disease status, cancer treatment plans and/or cancer treatment goals with the patient

## 2021-06-04 NOTE — PROGRESS NOTES
Hematology / Oncology Outpatient Follow Up Note    Alesia Erickson 60 y o  female :1955 HKR:[id number]         Date:  2021    Assessment / Plan:  A 45-year-old postmenopausal woman with stage IIA left breast cancer, grade 3, ER 70% positive, IA negative, HER2 3+ disease   She underwent lumpectomy and sentinel lymph node biopsy, resulting in GREG   She is negative for BRCA gene mutation   She completed 6 cycle of adjuvant chemotherapy with TCH with excellent tolerance followed by adjuvant trastuzumab monotherapy  She is currently on adjuvant hormonal therapy with anastrozole with no toxicity  Clinically, she has no evidence recurrent disease  I recommended her to continue with anastrozole 1 mg once a day  She is in agreement with my recommendation  I will see her again in a year for routine follow-up         Subjective:      HPI:  A 78-year-old postmenopausal woman who noticed a lump in her left breast which she brought to medical attention   She underwent left breast biopsy in 2019 which showed invasive ductal carcinoma, grade 1  This was ER 70% positive, IA negative, HER2 3+ disease   She underwent genetic testing which was negative for BRCA gene mutation   She underwent lumpectomy and sentinel lymph node biopsy by Dr Anjum Wilsonet to this, she had CT scan of chest abdomen pelvis which showed no obvious distant metastasis   However, she has multiple indeterminate liver lesions   Her LFT is mildly elevated   She has no history of viral hepatitis   However, she drinks 2 alcohol every day for nearly 40 years   Intermittently, she drinks more   Lumpectomy showed 2 2 cm of invasive ductal carcinoma, grade 3   There was no evidence of lymphovascular invasion   1 sentinel lymph node was negative for metastatic disease   She presents today to discuss adjuvant treatment options   She has no complaint of pain   Her weight is stable   She has no respiratory symptoms  Silvino Alvarado has no family history of breast or ovarian cancer   She quit smoking nearly 10 years ago  Becky Gerard performance status is normal            Interval History:  A 64-year-old postmenopausal woman with stage IIA left breast cancer, grade 3, ER 70% positive, ME negative, HER2 3+ disease   She underwent lumpectomy and sentinel lymph node biopsy, resulting in GREG   She is negative for BRCA gene mutation   She completed 6 cycle of adjuvant chemotherapy with [address] with excellent tolerance in March 2020  She completed trastuzumab adjuvant monotherapy in November 2020 without cardiac toxicity  She is currently on adjuvant hormonal therapy with anastrozole  She presents today for routine follow-up  She feels well with no new complaint except weight gain  She has no hot flashes or musculoskeletal symptoms  She denied cough, sputum production or shortness of breath  Her performance status is normal        Objective:      Primary Diagnosis:     1  Left breast cancer, stage IIA (pT2, pN0, M0) grade 3, ER 70% positive, ME negative, HER2 3+ disease   Diagnosed in October 2019       2  BRCA gene mutation negative      Cancer Staging:  Cancer Staging  Malignant neoplasm of lower-outer quadrant of left breast of female, estrogen receptor positive ([facility] [address] )  Staging form: Breast, AJCC 8th Edition  - Pathologic: Stage IIA (pT2, pN0(sn), cM0, G3, ER+, ME-, HER2+) - Unsigned  Neoadjuvant therapy: No  Laterality: Left  Tumor size (mm): 22  Method of lymph node assessment: [place] lymph node biopsy  Histologic grading system: 3 grade system           Previous Hematologic/ Oncologic Treatment:       1  Adjuvant chemotherapy with TCH x6 cycle   Completed in March 2020       2   Adjuvant trastuzumab monotherapy , completed in November 2020       Current Hematologic/ Oncologic Treatment:       1  Adjuvant hormonal therapy with anastrozole since June 2020        Disease Status:      GREG is status post lumpectomy and sentinel lymph node biopsy      Test Results:     Pathology:     2 2 cm of invasive ductal carcinoma, grade 3  No evidence of lymphovascular invasion   1 sentinel lymph node was negative for metastatic disease   ER 70% positive, NM negative, HER2 3+ disease   Stage IIA (pT2, pN0, M0)     Radiology:     Mammography in November 2020 was benign  BI-RADS 2       Echocardiogram in July 2020 showed ejection fraction 50%     Laboratory:     See below      Physical Exam:        General Appearance:    Alert, oriented          Eyes:    PERRL   Ears:    Normal external ear canals, both ears   Nose:   Nares normal, septum midline   Throat:   Mucosa moist  Pharynx without injection  Neck:   Supple         Lungs:     Clear to auscultation bilaterally   Chest Wall:    No tenderness or deformity    Heart:    Regular rate and rhythm         Abdomen:     Soft, non-tender, bowel sounds +, liver is palpable 3 cm below right costal margin   Spleen is not palpable                Extremities:   Extremities no cyanosis or edema         Skin:   no rash or icterus  Lymph nodes:   Cervical, supraclavicular, and axillary nodes normal   Neurologic:   CNII-XII intact, normal strength, sensation and reflexes     Throughout             Breast exam:   Lumpectomy scar at 6 o'clock position in her left breast with no palpable abnormalities   Right breast exam is negative  ROS: Review of Systems   All other systems reviewed and are negative  Imaging: No results found        Labs:   Lab Results   Component Value Date    WBC 5 10 12/29/2020    HGB 13 8 12/29/2020    HCT 41 8 12/29/2020    MCV 99 (H) 12/29/2020     12/29/2020     Lab Results   Component Value Date    K 3 7 03/20/2020     03/20/2020    CO2 26 03/20/2020    BUN 9 03/20/2020    CREATININE 0 20 (L) 03/20/2020    GLUF 104 (H) 03/20/2020    CALCIUM 9 1 03/20/2020    AST 37 03/20/2020    ALT 25 03/20/2020    ALKPHOS 62 03/20/2020    EGFR 140 03/20/2020         Current Medications: Reviewed  Allergies: Reviewed  PMH/FH/SH:  Reviewed      Vital Sign:    Body surface area is 1 78 meters squared      Wt Readings from Last 3 Encounters:   06/04/21 80 7 kg (178 lb)   12/29/20 78 kg (172 lb)   12/04/20 78 kg (172 lb)        Temp Readings from Last 3 Encounters:   06/04/21 97 6 °F (36 4 °C) (Tympanic Core)   12/29/20 (!) 97 3 °F (36 3 °C) (Tympanic)   12/04/20 (!) 96 3 °F (35 7 °C) (Tympanic Core)        BP Readings from Last 3 Encounters:   06/04/21 148/100   12/29/20 169/88   12/04/20 142/98         Pulse Readings from Last 3 Encounters:   06/04/21 (!) 107   12/29/20 86   12/04/20 105     @LASTSAO2(3)@

## 2021-06-14 ENCOUNTER — APPOINTMENT (OUTPATIENT)
Dept: LAB | Facility: CLINIC | Age: 66
End: 2021-06-14

## 2021-06-14 ENCOUNTER — TRANSCRIBE ORDERS (OUTPATIENT)
Dept: LAB | Facility: CLINIC | Age: 66
End: 2021-06-14

## 2021-06-14 ENCOUNTER — APPOINTMENT (OUTPATIENT)
Dept: LAB | Facility: CLINIC | Age: 66
End: 2021-06-14
Payer: COMMERCIAL

## 2021-06-14 DIAGNOSIS — Z11.59 NEED FOR HEPATITIS C SCREENING TEST: ICD-10-CM

## 2021-06-14 DIAGNOSIS — Z00.8 HEALTH EXAMINATION IN POPULATION SURVEY: ICD-10-CM

## 2021-06-14 DIAGNOSIS — Z11.4 SCREENING FOR HIV (HUMAN IMMUNODEFICIENCY VIRUS): ICD-10-CM

## 2021-06-14 DIAGNOSIS — Z00.8 HEALTH EXAMINATION IN POPULATION SURVEY: Primary | ICD-10-CM

## 2021-06-14 LAB
ALBUMIN SERPL BCP-MCNC: 3.6 G/DL (ref 3.5–5)
ALP SERPL-CCNC: 85 U/L (ref 46–116)
ALT SERPL W P-5'-P-CCNC: 91 U/L (ref 12–78)
ANION GAP SERPL CALCULATED.3IONS-SCNC: 7 MMOL/L (ref 4–13)
AST SERPL W P-5'-P-CCNC: 35 U/L (ref 5–45)
BASOPHILS # BLD AUTO: 0.04 THOUSANDS/ΜL (ref 0–0.1)
BASOPHILS NFR BLD AUTO: 1 % (ref 0–1)
BILIRUB SERPL-MCNC: 0.6 MG/DL (ref 0.2–1)
BUN SERPL-MCNC: 14 MG/DL (ref 5–25)
CALCIUM SERPL-MCNC: 9.6 MG/DL (ref 8.3–10.1)
CHLORIDE SERPL-SCNC: 104 MMOL/L (ref 100–108)
CHOLEST SERPL-MCNC: 250 MG/DL (ref 50–200)
CO2 SERPL-SCNC: 27 MMOL/L (ref 21–32)
CREAT SERPL-MCNC: 0.63 MG/DL (ref 0.6–1.3)
EOSINOPHIL # BLD AUTO: 0.08 THOUSAND/ΜL (ref 0–0.61)
EOSINOPHIL NFR BLD AUTO: 2 % (ref 0–6)
ERYTHROCYTE [DISTWIDTH] IN BLOOD BY AUTOMATED COUNT: 13.3 % (ref 11.6–15.1)
EST. AVERAGE GLUCOSE BLD GHB EST-MCNC: 114 MG/DL
GFR SERPL CREATININE-BSD FRML MDRD: 95 ML/MIN/1.73SQ M
GLUCOSE P FAST SERPL-MCNC: 125 MG/DL (ref 65–99)
HBA1C MFR BLD: 5.6 %
HCT VFR BLD AUTO: 43.1 % (ref 34.8–46.1)
HCV AB SER QL: NORMAL
HDLC SERPL-MCNC: 76 MG/DL
HGB BLD-MCNC: 13.7 G/DL (ref 11.5–15.4)
IMM GRANULOCYTES # BLD AUTO: 0.01 THOUSAND/UL (ref 0–0.2)
IMM GRANULOCYTES NFR BLD AUTO: 0 % (ref 0–2)
LDLC SERPL CALC-MCNC: 151 MG/DL (ref 0–100)
LYMPHOCYTES # BLD AUTO: 1.21 THOUSANDS/ΜL (ref 0.6–4.47)
LYMPHOCYTES NFR BLD AUTO: 25 % (ref 14–44)
MCH RBC QN AUTO: 32.2 PG (ref 26.8–34.3)
MCHC RBC AUTO-ENTMCNC: 31.8 G/DL (ref 31.4–37.4)
MCV RBC AUTO: 101 FL (ref 82–98)
MONOCYTES # BLD AUTO: 0.58 THOUSAND/ΜL (ref 0.17–1.22)
MONOCYTES NFR BLD AUTO: 12 % (ref 4–12)
NEUTROPHILS # BLD AUTO: 2.94 THOUSANDS/ΜL (ref 1.85–7.62)
NEUTS SEG NFR BLD AUTO: 60 % (ref 43–75)
NONHDLC SERPL-MCNC: 174 MG/DL
NRBC BLD AUTO-RTO: 0 /100 WBCS
PLATELET # BLD AUTO: 214 THOUSANDS/UL (ref 149–390)
PMV BLD AUTO: 9.7 FL (ref 8.9–12.7)
POTASSIUM SERPL-SCNC: 4 MMOL/L (ref 3.5–5.3)
PROT SERPL-MCNC: 7.6 G/DL (ref 6.4–8.2)
RBC # BLD AUTO: 4.26 MILLION/UL (ref 3.81–5.12)
SODIUM SERPL-SCNC: 138 MMOL/L (ref 136–145)
TRIGL SERPL-MCNC: 114 MG/DL
TSH SERPL DL<=0.05 MIU/L-ACNC: 1.65 UIU/ML (ref 0.36–3.74)
WBC # BLD AUTO: 4.86 THOUSAND/UL (ref 4.31–10.16)

## 2021-06-14 PROCEDURE — 80053 COMPREHEN METABOLIC PANEL: CPT | Performed by: FAMILY MEDICINE

## 2021-06-14 PROCEDURE — 84443 ASSAY THYROID STIM HORMONE: CPT | Performed by: FAMILY MEDICINE

## 2021-06-14 PROCEDURE — 80061 LIPID PANEL: CPT | Performed by: FAMILY MEDICINE

## 2021-06-14 PROCEDURE — 36415 COLL VENOUS BLD VENIPUNCTURE: CPT | Performed by: FAMILY MEDICINE

## 2021-06-14 PROCEDURE — 83036 HEMOGLOBIN GLYCOSYLATED A1C: CPT

## 2021-06-14 PROCEDURE — 85025 COMPLETE CBC W/AUTO DIFF WBC: CPT | Performed by: FAMILY MEDICINE

## 2021-06-14 PROCEDURE — 86803 HEPATITIS C AB TEST: CPT

## 2021-06-14 PROCEDURE — 87389 HIV-1 AG W/HIV-1&-2 AB AG IA: CPT

## 2021-06-15 LAB — HIV 1+2 AB+HIV1 P24 AG SERPL QL IA: NORMAL

## 2021-06-16 ENCOUNTER — OFFICE VISIT (OUTPATIENT)
Dept: FAMILY MEDICINE CLINIC | Facility: CLINIC | Age: 66
End: 2021-06-16
Payer: COMMERCIAL

## 2021-06-16 VITALS
HEIGHT: 61 IN | WEIGHT: 178 LBS | DIASTOLIC BLOOD PRESSURE: 90 MMHG | SYSTOLIC BLOOD PRESSURE: 140 MMHG | BODY MASS INDEX: 33.61 KG/M2 | TEMPERATURE: 95 F

## 2021-06-16 DIAGNOSIS — E78.2 MIXED HYPERLIPIDEMIA: ICD-10-CM

## 2021-06-16 DIAGNOSIS — Z00.00 ANNUAL PHYSICAL EXAM: ICD-10-CM

## 2021-06-16 DIAGNOSIS — I10 ESSENTIAL HYPERTENSION: Primary | ICD-10-CM

## 2021-06-16 DIAGNOSIS — C50.512 MALIGNANT NEOPLASM OF LOWER-OUTER QUADRANT OF LEFT BREAST OF FEMALE, ESTROGEN RECEPTOR POSITIVE (HCC): ICD-10-CM

## 2021-06-16 DIAGNOSIS — Z12.11 COLON CANCER SCREENING: ICD-10-CM

## 2021-06-16 DIAGNOSIS — Z17.0 MALIGNANT NEOPLASM OF LOWER-OUTER QUADRANT OF LEFT BREAST OF FEMALE, ESTROGEN RECEPTOR POSITIVE (HCC): ICD-10-CM

## 2021-06-16 PROCEDURE — 99396 PREV VISIT EST AGE 40-64: CPT | Performed by: FAMILY MEDICINE

## 2021-06-16 PROCEDURE — 99214 OFFICE O/P EST MOD 30 MIN: CPT | Performed by: FAMILY MEDICINE

## 2021-06-16 RX ORDER — OLMESARTAN MEDOXOMIL AND HYDROCHLOROTHIAZIDE 40/12.5 40; 12.5 MG/1; MG/1
1 TABLET ORAL DAILY
Qty: 90 TABLET | Refills: 1 | Status: SHIPPED | OUTPATIENT
Start: 2021-06-16 | End: 2021-09-01 | Stop reason: SDUPTHER

## 2021-06-16 RX ORDER — ROSUVASTATIN CALCIUM 5 MG/1
5 TABLET, COATED ORAL 2 TIMES WEEKLY
Qty: 24 TABLET | Refills: 1 | Status: SHIPPED | OUTPATIENT
Start: 2021-06-17 | End: 2021-09-01 | Stop reason: SDUPTHER

## 2021-06-16 NOTE — PROGRESS NOTES
ADULT ANNUAL PHYSICAL  [address]    NAME: Jens Nguyen  AGE: 60 y o  SEX: female  : 1955     DATE: 2021     Assessment and Plan:     Problem List Items Addressed This Visit        Cardiovascular and Mediastinum    Hypertension - Primary    Relevant Medications    olmesartan-hydrochlorothiazide (BENICAR HCT) 40-12 5 MG per tablet       Other    Malignant neoplasm of lower-outer quadrant of left breast of female, estrogen receptor positive (HCC)    Hyperlipidemia    Relevant Medications    rosuvastatin (CRESTOR) 5 mg tablet (Start on 2021)      Other Visit Diagnoses     Annual physical exam        Colon cancer screening        Relevant Orders    Cologuard          Immunizations and preventive care screenings were discussed with patient today  Appropriate education was printed on patient's after visit summary  Counseling:  Alcohol/drug use: discussed moderation in alcohol intake, the recommendations for healthy alcohol use, and avoidance of illicit drug use  Dental Health: discussed importance of regular tooth brushing, flossing, and dental visits  Injury prevention: discussed safety/seat belts, safety helmets, smoke detectors, carbon dioxide detectors, and smoking near bedding or upholstery  Sexual health: discussed sexually transmitted diseases, partner selection, use of condoms, avoidance of unintended pregnancy, and contraceptive alternatives  · Exercise: the importance of regular exercise/physical activity was discussed  Recommend exercise 3-5 times per week for at least 30 minutes  Return in 6 months (on 2021)       Chief Complaint:     Chief Complaint   Patient presents with    Annual Exam      History of Present Illness:     Adult Annual Physical   Patient here for a comprehensive physical exam  The patient reports problems - see list     Diet and Physical Activity  · Diet/Nutrition: well balanced diet    · Exercise: no formal exercise  Depression Screening  PHQ-9 Depression Screening    PHQ-9:   Frequency of the following problems over the past two weeks:      Little interest or pleasure in doing things: 0 - not at all  Feeling down, depressed, or hopeless: 0 - not at all  PHQ-2 Score: 0       General Health  · Sleep: sleeps well  · Hearing: normal - bilateral   · Vision: no vision problems  · Dental: regular dental visits  /GYN Health  · Patient is: na  · Last menstrual period: na  · Contraceptive method: na      Review of Systems:     Review of Systems   Constitutional: Negative  HENT: Negative  Eyes: Negative  Respiratory: Negative  Cardiovascular: Negative  Gastrointestinal: Negative  Endocrine: Negative  Genitourinary: Negative  Musculoskeletal: Negative  Skin: Negative  Allergic/Immunologic: Negative  Neurological: Negative  Hematological: Negative  Psychiatric/Behavioral: Negative  All other systems reviewed and are negative  Past Medical History:     Past Medical History:   Diagnosis Date    Breast cancer ([facility] [address] ) 10/2019    grade 3 ER Positive MD negative Her 2 3+ disease    History of chemotherapy 03/2020    History of radiation therapy 04/2020    Hyperlipidemia     Hypertension     Vertigo     no medication      Past Surgical History:     Past Surgical History:   Procedure Laterality Date    BREAST BIOPSY Left 09/17/2019    Left U/S BX    BREAST LUMPECTOMY Left 10/29/2019    Procedure: BREAST NEEDLE LOCALIZED LUMPECTOMY (NEEDLE LOC AT 0800); Surgeon: Talon Webb MD;  [facility]: [facility];  Service: Surgical Oncology    FRACTURE SURGERY Left     surgery repair- elbow     IR PORT PLACEMENT  12/3/2019    IR PORT REMOVAL  12/29/2020    LYMPH NODE BIOPSY Left 10/29/2019    Procedure: SENTINEL LYMPH NODE BIOPSY; LYMPHATIC MAPPING WITH BLUE DYE AND RADIOACTIVE DYE (INJECT AT 0900 BY DR PALACIOS IN THE OR);   Surgeon: Talon Webb MD; Location[facility];  Service: Surgical Oncology    MAMMO NEEDLE LOCALIZATION LEFT (ALL INC) Left 10/29/2019    MAMMO STEREOTACTIC BREAST BIOPSY RIGHT (ALL INC) Right 2019    US GUIDANCE BREAST BIOPSY LEFT EACH ADDITIONAL Left 2019    US GUIDED BREAST BIOPSY LEFT COMPLETE Left 2019    WISDOM TOOTH EXTRACTION Bilateral       Social History:     Social History     Socioeconomic History    Marital status: /Civil Union     Spouse name: None    Number of children: None    Years of education: None    Highest education level: None   Occupational History    None   Tobacco Use    Smoking status: Former Smoker     Packs/day: 0 75     Years: 38 00     Pack years: 28 50     Quit date:      Years since quittin 4    Smokeless tobacco: Never Used   Vaping Use    Vaping Use: Never used   Substance and Sexual Activity    Alcohol use: Yes    Drug use: Never    Sexual activity: Yes     Partners: Female     Birth control/protection: None, Post-menopausal   Other Topics Concern    None   Social History Narrative    None     Social Determinants of Health     Financial Resource Strain:     Difficulty of Paying Living Expenses:    Food Insecurity:     Worried About Running Out of Food in the Last Year:     [address] in the Last Year:    Transportation Needs:     Lack of Transportation (Medical):      Lack of Transportation (Non-Medical):    Physical Activity:     Days of Exercise per Week:     Minutes of Exercise per Session:    Stress:     Feeling of Stress :    Social Connections:     Frequency of Communication with Friends and Family:     Frequency of Social Gatherings with Friends and Family:     Attends Jew Services:     Active Member of Clubs or Organizations:     Attends Club or Organization Meetings:     Marital Status:    Intimate Partner Violence:     Fear of Current or Ex-Partner:     Emotionally Abused:     Physically Abused:     Sexually Abused: Family History:     Family History   Problem Relation Age of Onset    No Known Problems Mother     Prostate cancer Father 79    Arthritis Father     No Known Problems Sister     No Known Problems Paternal Aunt     Nail disease Paternal Aunt     No Known Problems Paternal Aunt     No Known Problems Maternal Grandmother     No Known Problems Maternal Grandfather     No Known Problems Paternal Grandmother     No Known Problems Paternal Grandfather       Current Medications:     Current Outpatient Medications   Medication Sig Dispense Refill    anastrozole (ARIMIDEX) 1 mg tablet Take 1 tablet (1 mg total) by mouth daily 90 tablet 1    Cholecalciferol (VITAMIN D3) 1000 units CAPS Take 1,000 capsules by mouth daily in the early morning       Multiple Vitamins-Minerals (CENTRUM SILVER 50+WOMEN PO) Take 1 tablet by mouth daily in the early morning       [START ON 6/17/2021] rosuvastatin (CRESTOR) 5 mg tablet Take 1 tablet (5 mg total) by mouth 2 (two) times a week 24 tablet 1    olmesartan-hydrochlorothiazide (BENICAR HCT) 40-12 5 MG per tablet Take 1 tablet by mouth daily 90 tablet 1     No current facility-administered medications for this visit  Allergies: Allergies   Allergen Reactions    Adhesive [Medical Tape]       Physical Exam:     /90 (BP Location: Right arm, Patient Position: Sitting, Cuff Size: Standard)   Temp (!) 95 °F (35 °C) (Tympanic)   Ht 5' 1" (1 549 m)   Wt 80 7 kg (178 lb)   LMP  (LMP Unknown)   BMI 33 63 kg/m²     Physical Exam  Vitals and nursing note reviewed  Constitutional:       Appearance: She is well-developed  HENT:      Head: Normocephalic  Eyes:      Pupils: Pupils are equal, round, and reactive to light  Cardiovascular:      Rate and Rhythm: Normal rate and regular rhythm  Heart sounds: Normal heart sounds  Pulmonary:      Effort: Pulmonary effort is normal       Breath sounds: Normal breath sounds     Abdominal:      General: Bowel sounds are normal       Palpations: Abdomen is soft  Musculoskeletal:      Cervical back: Normal range of motion and neck supple  Skin:     General: Skin is warm and dry  Capillary Refill: Capillary refill takes less than 2 seconds  Neurological:      Mental Status: She is alert and oriented to person, place, and time     Psychiatric:         Behavior: Behavior normal           Kelly Lemons DO  [address]

## 2021-06-16 NOTE — PATIENT INSTRUCTIONS
Wellness Visit for Adults   AMBULATORY CARE:   A wellness visit  is when you see your healthcare provider to get screened for health problems  Your healthcare provider will also give you advice on how to stay healthy  Write down your questions so you remember to ask them  Ask your healthcare provider how often you should have a wellness visit  What happens at a wellness visit:  Your healthcare provider will ask about your health, and your family history of health problems  This includes high blood pressure, heart disease, and cancer  He or she will ask if you have symptoms that concern you, if you smoke, and about your mood  You may also be asked about your intake of medicines, supplements, food, and alcohol  Any of the following may be done:  · Your weight  will be checked  Your height may also be checked so your body mass index (BMI) can be calculated  Your BMI shows if you are at a healthy weight  · Your blood pressure  and heart rate will be checked  Your temperature may also be checked  · Blood and urine tests  may be done  Blood tests may be done to check your cholesterol levels  Abnormal cholesterol levels increase your risk for heart disease and stroke  You may also need a blood or urine test to check for diabetes if you are at increased risk  Urine tests may be done to look for signs of an infection or kidney disease  · A physical exam  includes checking your heartbeat and lungs with a stethoscope  Your healthcare provider may also check your skin to look for sun damage  · Screening tests  may be recommended  A screening test is done to check for diseases that may not cause symptoms  The screening tests you may need depend on your age, gender, family history, and lifestyle habits  For example, colorectal screening may be recommended if you are 48years old or older  Screening tests you need if you are a woman:   · A Pap smear  is used to screen for cervical cancer   Pap smears are usually done every 3 to 5 years depending on your age  You may need them more often if you have had abnormal Pap smear test results in the past  Ask your healthcare provider how often you should have a Pap smear  · A mammogram  is an x-ray of your breasts to screen for breast cancer  Experts recommend mammograms every 2 years starting at age 48 years  You may need a mammogram at age 52 years or younger if you have an increased risk for breast cancer  Talk to your healthcare provider about when you should start having mammograms and how often you need them  Vaccines you may need:   · Get an influenza vaccine  every year  The influenza vaccine protects you from the flu  Several types of viruses cause the flu  The viruses change over time, so new vaccines are made each year  · Get a tetanus-diphtheria (Td) booster vaccine  every 10 years  This vaccine protects you against tetanus and diphtheria  Tetanus is a severe infection that may cause painful muscle spasms and lockjaw  Diphtheria is a severe bacterial infection that causes a thick covering in the back of your mouth and throat  · Get a human papillomavirus (HPV) vaccine  if you are female and aged 23 to 32 or male 23 to 24 and never received it  This vaccine protects you from HPV infection  HPV is the most common infection spread by sexual contact  HPV may also cause vaginal, penile, and anal cancers  · Get a pneumococcal vaccine  if you are aged 72 years or older  The pneumococcal vaccine is an injection given to protect you from pneumococcal disease  Pneumococcal disease is an infection caused by pneumococcal bacteria  The infection may cause pneumonia, meningitis, or an ear infection  · Get a shingles vaccine  if you are 60 or older, even if you have had shingles before  The shingles vaccine is an injection to protect you from the varicella-zoster virus  This is the same virus that causes chickenpox   Shingles is a painful rash that develops in people who had chickenpox or have been exposed to the virus  How to eat healthy:  My Plate is a model for planning healthy meals  It shows the types and amounts of foods that should go on your plate  Fruits and vegetables make up about half of your plate, and grains and protein make up the other half  A serving of dairy is included on the side of your plate  The amount of calories and serving sizes you need depends on your age, gender, weight, and height  Examples of healthy foods are listed below:  · Eat a variety of vegetables  such as dark green, red, and orange vegetables  You can also include canned vegetables low in sodium (salt) and frozen vegetables without added butter or sauces  · Eat a variety of fresh fruits , canned fruit in 100% juice, frozen fruit, and dried fruit  · Include whole grains  At least half of the grains you eat should be whole grains  Examples include whole-wheat bread, wheat pasta, brown rice, and whole-grain cereals such as oatmeal     · Eat a variety of protein foods such as seafood (fish and shellfish), lean meat, and poultry without skin (turkey and chicken)  Examples of lean meats include pork leg, shoulder, or tenderloin, and beef round, sirloin, tenderloin, and extra lean ground beef  Other protein foods include eggs and egg substitutes, beans, peas, soy products, nuts, and seeds  · Choose low-fat dairy products such as skim or 1% milk or low-fat yogurt, cheese, and cottage cheese  · Limit unhealthy fats  such as butter, hard margarine, and shortening  Exercise:  Exercise at least 30 minutes per day on most days of the week  Some examples of exercise include walking, biking, dancing, and swimming  You can also fit in more physical activity by taking the stairs instead of the elevator or parking farther away from stores  Include muscle strengthening activities 2 days each week  Regular exercise provides many health benefits   It helps you manage your weight, and decreases your risk for type 2 diabetes, heart disease, stroke, and high blood pressure  Exercise can also help improve your mood  Ask your healthcare provider about the best exercise plan for you  General health and safety guidelines:   · Do not smoke  Nicotine and other chemicals in cigarettes and cigars can cause lung damage  Ask your healthcare provider for information if you currently smoke and need help to quit  E-cigarettes or smokeless tobacco still contain nicotine  Talk to your healthcare provider before you use these products  · Limit alcohol  A drink of alcohol is 12 ounces of beer, 5 ounces of wine, or 1½ ounces of liquor  · Lose weight, if needed  Being overweight increases your risk of certain health conditions  These include heart disease, high blood pressure, type 2 diabetes, and certain types of cancer  · Protect your skin  Do not sunbathe or use tanning beds  Use sunscreen with a SPF 15 or higher  Apply sunscreen at least 15 minutes before you go outside  Reapply sunscreen every 2 hours  Wear protective clothing, hats, and sunglasses when you are outside  · Drive safely  Always wear your seatbelt  Make sure everyone in your car wears a seatbelt  A seatbelt can save your life if you are in an accident  Do not use your cell phone when you are driving  This could distract you and cause an accident  Pull over if you need to make a call or send a text message  · Practice safe sex  Use latex condoms if are sexually active and have more than one partner  Your healthcare provider may recommend screening tests for sexually transmitted infections (STIs)  · Wear helmets, lifejackets, and protective gear  Always wear a helmet when you ride a bike or motorcycle, go skiing, or play sports that could cause a head injury  Wear protective equipment when you play sports  Wear a lifejacket when you are on a boat or doing water sports      © Copyright [employer] 2020 Information is for End User's use only and may not be sold, redistributed or otherwise used for commercial purposes  All illustrations and images included in CareNotes® are the copyrighted property of A D A M [employer] Inc  or Sarah Mckinnon  The above information is an  only  It is not intended as medical advice for individual conditions or treatments  Talk to your doctor, nurse or pharmacist before following any medical regimen to see if it is safe and effective for you  Cholesterol and Your Health   AMBULATORY CARE:   Cholesterol  is a waxy, fat-like substance  Your body uses cholesterol to make hormones and new cells, and to protect nerves  Cholesterol is made by your body  It also comes from certain foods you eat, such as meat and dairy products  Your healthcare provider can help you set goals for your cholesterol levels  He or she can help you create a plan to meet your goals  Cholesterol level goals: Your cholesterol level goals depend on your risk for heart disease, your age, and your other health conditions  The following are general guidelines:  · Total cholesterol  includes low-density lipoprotein (LDL), high-density lipoprotein (HDL), and triglyceride levels  The total cholesterol level should be lower than 200 mg/dL and is best at about 150 mg/dL  · LDL cholesterol  is called bad cholesterol  because it forms plaque in your arteries  As plaque builds up, your arteries become narrow, and less blood flows through  When plaque decreases blood flow to your heart, you may have chest pain  If plaque completely blocks an artery that brings blood to your heart, you may have a heart attack  Plaque can break off and form blood clots  Blood clots may block arteries in your brain and cause a stroke  The level should be less than 130 mg/dL and is best at about 100 mg/dL  · HDL cholesterol  is called good cholesterol  because it helps remove LDL cholesterol from your arteries   It does this by attaching to LDL cholesterol and carrying it to your liver  Your liver breaks down LDL cholesterol so your body can get rid of it  High levels of HDL cholesterol can help prevent a heart attack and stroke  Low levels of HDL cholesterol can increase your risk for heart disease, heart attack, and stroke  The level should be 60 mg/dL or higher  · Triglycerides  are a type of fat that store energy from foods you eat  High levels of triglycerides also cause plaque buildup  This can increase your risk for a heart attack or stroke  If your triglyceride level is high, your LDL cholesterol level may also be high  The level should be less than 150 mg/dL  What increases your risk for high cholesterol:   · Smoking cigarettes    · Being overweight or obese, or not getting enough exercise    · Drinking large amounts of alcohol    · A medical condition such as hypertension (high blood pressure) or diabetes    · Certain genes passed from your parents to you    · Age older than 65 years    What you need to know about having your cholesterol levels checked: Adults 21to 39years of age should have their cholesterol levels checked every 4 to 6 years  Adults 45 years or older should have their cholesterol checked every 1 to 2 years  You may need your cholesterol checked more often, or at a younger age, if you have risk factors for heart disease  You may also need to have your cholesterol checked more often if you have other health conditions, such as diabetes  Blood tests are used to check cholesterol levels  Blood tests measure your levels of triglycerides, LDL cholesterol, and HDL cholesterol  How healthy fats affect your cholesterol levels:  Healthy fats, also called unsaturated fats, help lower LDL cholesterol and triglyceride levels  Healthy fats include the following:  · Monounsaturated fats  are found in foods such as olive oil, canola oil, avocado, nuts, and olives      · Polyunsaturated fats,  such as omega 3 fats, are found in fish, such as salmon, trout, and tuna  They can also be found in plant foods such as flaxseed, walnuts, and soybeans  How unhealthy fats affect your cholesterol levels:  Unhealthy fats increase LDL cholesterol and triglyceride levels  They are found in foods high in cholesterol, saturated fat, and trans fat:  · Cholesterol  is found in eggs, dairy, and meat  · Saturated fat  is found in butter, cheese, ice cream, whole milk, and coconut oil  Saturated fat is also found in meat, such as sausage, hot dogs, and bologna  · Trans fat  is found in liquid oils and is used in fried and baked foods  Foods that contain trans fats include chips, crackers, muffins, sweet rolls, microwave popcorn, and cookies  Treatment  for high cholesterol will also decrease your risk of heart disease, heart attack, and stroke  Treatment may include any of the following:  · Lifestyle changes  may include food, exercise, weight loss, and quitting smoking  You may also need to decrease the amount of alcohol you drink  Your healthcare provider will want you to start with lifestyle changes  Other treatment may be added if lifestyle changes are not enough  · Medicines  may be given to lower your LDL cholesterol, triglyceride levels, or total cholesterol level  You may need medicines to lower your cholesterol if any of the following is true:     ? You have a history of stroke, TIA, unstable angina, or a heart attack  ? Your LDL cholesterol level is 190 mg/dL or higher  ? You are age 36 to 76 years, have diabetes or heart disease risk factors, and your LDL cholesterol is 70 mg/dL or higher  · Supplements  include fish oil, red yeast rice, and garlic  Fish oil may help lower your triglyceride and LDL cholesterol levels  It may also increase your HDL cholesterol level  Red yeast rice may help decrease your total cholesterol level and LDL cholesterol level  Garlic may help lower your total cholesterol level   Do not take these supplements without talking to your healthcare provider  Food changes you can make to lower your cholesterol levels:  A dietitian can help you create a healthy eating plan  He or she can show you how to read food labels and choose foods low in saturated fat, trans fats, and cholesterol  · Decrease the total amount of fat you eat  Choose lean meats, fat-free or 1% fat milk, and low-fat dairy products, such as yogurt and cheese  Try to limit or avoid red meats  Limit or do not eat fried foods or baked goods, such as cookies  · Replace unhealthy fats with healthy fats  Cook foods in olive oil or canola oil  Choose soft margarines that are low in saturated fat and trans fat  Seeds, nuts, and avocados are other examples of healthy fats  · Eat foods with omega-3 fats  Examples include salmon, tuna, mackerel, walnuts, and flaxseed  Eat fish 2 times per week  Pregnant women should not eat fish that have high levels of mercury, such as shark, swordfish, and barbara mackerel  · Increase the amount of high-fiber foods you eat  High-fiber foods can help lower your LDL cholesterol  Aim to get between 20 and 30 grams of fiber each day  Fruits and vegetables are high in fiber  Eat at least 5 servings each day  Other high-fiber foods are whole-grain or whole-wheat breads, pastas, or cereals, and brown rice  Eat 3 ounces of whole-grain foods each day  Increase fiber slowly  You may have abdominal discomfort, bloating, and gas if you add fiber to your diet too quickly  · Eat healthy protein foods  Examples include low-fat dairy products, skinless chicken and turkey, fish, and nuts  · Limit foods and drinks that are high in sugar  Your dietitian or healthcare provider can help you create daily limits for high-sugar foods and drinks  The limit may be lower if you have diabetes or another health condition  Limits can also help you lose weight if needed    Lifestyle changes you can make to lower your cholesterol levels:   · Maintain a healthy weight  Ask your healthcare provider what a healthy weight is for you  Ask him or her to help you create a weight loss plan if needed  Weight loss can decrease your total cholesterol and triglyceride levels  Weight loss may also help keep your blood pressure at a healthy level  · Exercise regularly  Exercise can help lower your total cholesterol level and maintain a healthy weight  Exercise can also help increase your HDL cholesterol level  Work with your healthcare provider to create an exercise program that is right for you  Get at least 30 to 40 minutes of moderate exercise most days of the week  Examples of exercise include brisk walking, swimming, or biking  · Do not smoke  Nicotine and other chemicals in cigarettes and cigars can raise your cholesterol levels  Ask your healthcare provider for information if you currently smoke and need help to quit  E-cigarettes or smokeless tobacco still contain nicotine  Talk to your healthcare provider before you use these products  · Limit or do not drink alcohol  Alcohol can increase your triglyceride levels  Ask your healthcare provider before you drink alcohol  Ask how much is okay for you to drink in 1 day or 1 week  © Copyright [address] Information is for End User's use only and may not be sold, redistributed or otherwise used for commercial purposes  All illustrations and images included in CareNotes® are the copyrighted property of [employer] M , Inc  or [address] [place], [place] [address]alicia   The above information is an  only  It is not intended as medical advice for individual conditions or treatments  Talk to your doctor, nurse or pharmacist before following any medical regimen to see if it is safe and effective for you

## 2021-06-16 NOTE — PROGRESS NOTES
Assessment/Plan: We reviewed her lab work today  We noted the blood sugar was 125  The cholesterol was elevated  She is taking Crestor 5 mg weekly  Will bump up to twice a we can follow her liver functions  She is working on diet exercise and weight loss  She is starting a vegetarian diet  She will continue follow-up with Oncology  Will follow up here in 6 months sooner if needed  Diagnoses and all orders for this visit:    Essential hypertension  -     olmesartan-hydrochlorothiazide (BENICAR HCT) 40-12 5 MG per tablet; Take 1 tablet by mouth daily    Mixed hyperlipidemia  -     rosuvastatin (CRESTOR) 5 mg tablet; Take 1 tablet (5 mg total) by mouth 2 (two) times a week    Malignant neoplasm of lower-outer quadrant of left breast of female, estrogen receptor positive (HCC)            Subjective:        Patient ID: Lakshmi Swift is a 60 y o  female  Patient presents with: Annual Exam            The following portions of the patient's history were reviewed and updated as appropriate: allergies, current medications, past family history, past medical history, past social history, past surgical history and problem list       Review of Systems   Constitutional: Negative  HENT: Negative  Eyes: Negative  Respiratory: Negative  Cardiovascular: Negative  Gastrointestinal: Negative  Endocrine: Negative  Genitourinary: Negative  Musculoskeletal: Negative  Skin: Negative  Allergic/Immunologic: Negative  Neurological: Negative  Hematological: Negative  Psychiatric/Behavioral: Negative  All other systems reviewed and are negative  Objective:      BMI Counseling: Body mass index is 33 63 kg/m²   The BMI is above normal  Nutrition recommendations include decreasing portion sizes, encouraging healthy choices of fruits and vegetables, decreasing fast food intake, consuming healthier snacks, limiting drinks that contain sugar, moderation in carbohydrate intake, increasing intake of lean protein, reducing intake of saturated and trans fat and reducing intake of cholesterol  Exercise recommendations include moderate physical activity 150 minutes/week  No pharmacotherapy was ordered  /90 (BP Location: Right arm, Patient Position: Sitting, Cuff Size: Standard)   Temp (!) 95 °F (35 °C) (Tympanic)   Ht 5' 1" (1 549 m)   Wt 80 7 kg (178 lb)   LMP  (LMP Unknown)   BMI 33 63 kg/m²          Physical Exam  Vitals and nursing note reviewed  Constitutional:       Appearance: She is well-developed  HENT:      Head: Normocephalic and atraumatic  Right Ear: External ear normal       Left Ear: External ear normal       Nose: Nose normal    Eyes:      Conjunctiva/sclera: Conjunctivae normal       Pupils: Pupils are equal, round, and reactive to light  Cardiovascular:      Rate and Rhythm: Normal rate and regular rhythm  Heart sounds: Normal heart sounds  Pulmonary:      Effort: Pulmonary effort is normal       Breath sounds: Normal breath sounds  Abdominal:      General: Bowel sounds are normal       Palpations: Abdomen is soft  Musculoskeletal:         General: Normal range of motion  Cervical back: Normal range of motion and neck supple  Skin:     General: Skin is warm and dry  Neurological:      General: No focal deficit present  Mental Status: She is alert and oriented to person, place, and time  Deep Tendon Reflexes: Reflexes are normal and symmetric     Psychiatric:         Mood and Affect: Mood normal          Behavior: Behavior normal

## 2021-07-26 ENCOUNTER — TELEPHONE (OUTPATIENT)
Dept: FAMILY MEDICINE CLINIC | Facility: CLINIC | Age: 66
End: 2021-07-26

## 2021-08-04 ENCOUNTER — OFFICE VISIT (OUTPATIENT)
Dept: URGENT CARE | Facility: MEDICAL CENTER | Age: 66
End: 2021-08-04
Payer: COMMERCIAL

## 2021-08-04 ENCOUNTER — TELEPHONE (OUTPATIENT)
Dept: FAMILY MEDICINE CLINIC | Facility: CLINIC | Age: 66
End: 2021-08-04

## 2021-08-04 VITALS
WEIGHT: 168 LBS | DIASTOLIC BLOOD PRESSURE: 80 MMHG | OXYGEN SATURATION: 95 % | HEART RATE: 138 BPM | HEIGHT: 61 IN | SYSTOLIC BLOOD PRESSURE: 134 MMHG | TEMPERATURE: 99.8 F | RESPIRATION RATE: 18 BRPM | BODY MASS INDEX: 31.72 KG/M2

## 2021-08-04 DIAGNOSIS — B34.9 VIRAL INFECTION: ICD-10-CM

## 2021-08-04 DIAGNOSIS — R05.9 COUGH: Primary | ICD-10-CM

## 2021-08-04 PROCEDURE — U0003 INFECTIOUS AGENT DETECTION BY NUCLEIC ACID (DNA OR RNA); SEVERE ACUTE RESPIRATORY SYNDROME CORONAVIRUS 2 (SARS-COV-2) (CORONAVIRUS DISEASE [COVID-19]), AMPLIFIED PROBE TECHNIQUE, MAKING USE OF HIGH THROUGHPUT TECHNOLOGIES AS DESCRIBED BY CMS-2020-01-R: HCPCS | Performed by: PHYSICIAN ASSISTANT

## 2021-08-04 PROCEDURE — U0005 INFEC AGEN DETEC AMPLI PROBE: HCPCS | Performed by: PHYSICIAN ASSISTANT

## 2021-08-04 NOTE — PATIENT INSTRUCTIONS
Patient was educated on [place] [address]  Patient was educated on the importance of hydration  Patient was educated to go to ED if she begins to have chest pain, shortness of breath or high grade fevers  Patient was educated to stay quaratined until results are back  COVID-19 (Coronavirus Disease 2019)   WHAT YOU NEED TO KNOW:   Coronavirus disease 2019 (COVID-19) is the disease caused by a coronavirus first discovered in December 2019  Coronaviruses generally cause upper respiratory (nose, throat, and lung) infections, such as a cold  The new virus spreads quickly and easily  The virus can be spread starting 2 days before symptoms even begin  The virus has also changed into several new forms (called variants) since it was discovered  The variants may be more contagious (easily spread) than the original form  Some may also cause more severe illness than others  It is important to follow local, national, and worldwide measures to protect yourself and others from infection  DISCHARGE INSTRUCTIONS:   If you think you or someone you know may be infected:  Do the following to protect others:  · If emergency care is needed,  tell the  about the possible infection, or call ahead and tell the emergency department  · Call a healthcare provider  for instructions if symptoms are mild  Anyone who may be infected should not  arrive without calling first  The provider will need to protect staff members and other patients  · The person who may be infected needs to wear a face covering  while getting medical care  This will help lower the risk of infecting others  Coverings are not used for anyone who is younger than 2 years, has breathing problems, or cannot remove it  The provider can give you instructions for anyone who cannot wear a covering      Call your local emergency number [phone] in the [address] [place] [address],[address]) or an emergency department if:   · You have trouble breathing or shortness of breath at rest     · You have chest pain or pressure that lasts longer than 5 minutes  · You become confused or hard to wake  · Your lips or face are blue  · You have a fever of 104°F (40°C) or higher  Call your doctor if:   · You do not  have symptoms of COVID-19 but had close physical contact within 14 days with someone who tested positive  · You have questions or concerns about your condition or care  Medicines: You may need any of the following for mild symptoms:  · Decongestants  help reduce nasal congestion and help you breathe more easily  If you take decongestant pills, they may make you feel restless or cause problems with your sleep  Do not use decongestant sprays for more than a few days  · Cough suppressants  help reduce coughing  Ask your healthcare provider which type of cough medicine is best for you  · Acetaminophen  decreases pain and fever  It is available without a doctor's order  Ask how much to take and how often to take it  Follow directions  Read the labels of all other medicines you are using to see if they also contain acetaminophen, or ask your doctor or pharmacist  Acetaminophen can cause liver damage if not taken correctly  Do not use more than 4 grams (4,000 milligrams) total of acetaminophen in one day  · NSAIDs , such as ibuprofen, help decrease swelling, pain, and fever  NSAIDs can cause stomach bleeding or kidney problems in certain people  If you take blood thinner medicine, always ask your healthcare provider if NSAIDs are safe for you  Always read the medicine label and follow directions  · Take your medicine as directed  Contact your healthcare provider if you think your medicine is not helping or if you have side effects  Tell him or her if you are allergic to any medicine  Keep a list of the medicines, vitamins, and herbs you take  Include the amounts, and when and why you take them  Bring the list or the pill bottles to follow-up visits   Carry your medicine list with you in case of an emergency  How the 2019 coronavirus spreads: The following are ways the virus is thought to spread, but more information may be coming:  · Droplets are the main way all coronaviruses spread  The virus travels in droplets that form when a person talks, coughs, or sneezes  The droplets can also float in the air for minutes or hours  Infection happens when you breathe in the droplets or get them in your eyes or nose  Close personal contact with an infected person increases your risk for infection  This means being within 6 feet (2 meters) of the person for at least 15 minutes over 24 hours  · Person-to-person contact can spread the virus  For example, a person with the virus on his or her hands can spread it by shaking hands with someone  · The virus can stay on objects and surfaces for a short time  You may become infected by touching the object or surface and then touching your eyes or mouth  · An infected animal may be able to infect a person who touches it  This may happen at live markets or on a farm  Help lower the risk for COVID-19:  The best way to prevent infection is to avoid anyone who is infected, but this can be hard to do  An infected person can spread the virus before signs or symptoms begin, or even if signs or symptoms never develop  The following can help lower the risk for infection:      · Wash your hands often throughout the day  Use soap and water  Rub your soapy hands together, lacing your fingers, for at least 20 seconds  Rinse with warm, running water  Dry your hands with a clean towel or paper towel  Use hand  that contains alcohol if soap and water are not available  Teach children how to wash their hands and use hand   · Cover sneezes and coughs  Turn your face away and cover your mouth and nose with a tissue  Throw the tissue away  Use the bend of your arm if a tissue is not available   Then wash your hands well with soap and water or use hand   Teach children how to cover a cough or sneeze  · Wear a face covering (mask) around anyone who does not live in your home  Use a cloth covering with at least 2 layers  You can also create layers by putting a cloth covering over a disposable non-medical mask  Cover your mouth and your nose  The covering should fit snugly against the bridge of your nose  Securely fasten it under your chin and on the sides of your face  Do not  wear a plastic face shield instead of a covering  Continue social distancing and washing your hands often  A face covering is not a substitute for social distancing safety measures  · Follow worldwide, national, and local social distancing guidelines  Keep at least 6 feet (2 meters) between you and others  Also keep this distance from anyone who comes to your home, such as someone making a delivery  Wear a face covering while you are around others  You will need to wear a covering in restaurants, stores, and other public buildings  You will also need a covering on mass transit, such as a bus, subway, or airplane  Remember to use a covering made from thick material or wear 2 coverings together  · Make a habit of not touching your face  If you get the virus on your hands, you can transfer it to your eyes, nose, or mouth and become infected  You can also transfer it to objects, surfaces, or people  Do not touch your eyes, nose, or mouth without washing your hands first     · Clean and disinfect high-touch surfaces and objects often  Use disinfecting wipes, or make a solution of 4 teaspoons of bleach in 1 quart (4 cups) of water  Clean and disinfect even if you think no one living in or coming to your home is infected with the virus  · Ask about vaccines you may need  Get a COVID-19 vaccine when it is available to you  The current vaccines are given as a shot in 1 or 2 doses   Get the influenza (flu) vaccine as soon as recommended each year, usually starting in September or October  Get the pneumonia vaccine if recommended  Follow social distancing guidelines:  National and local social distancing rules vary  Rules may change over time as restrictions are lifted  Restrictions may return if an outbreak happens where you live  It is important to know and follow all current social distancing rules in your area  The following are general guidelines:  · Stay home if you are sick or think you may have COVID-19  It is important to stay home if you are waiting for a testing appointment or for test results  Even if you do not have symptoms, you can pass the virus to others  · Limit trips out of your home  Have food, medicines, and other supplies delivered and left at your door or other area, if possible  Plan trips out of your home so you make the fewest stops possible to limit close personal contact  Keep track of places you go  This will help contact tracers notify others if you become infected  · Avoid close physical contact with anyone who does not live in your home  Do not shake hands with, hug, or kiss a person as a greeting  If you must use public transportation (such as a bus or subway), try to sit or stand away from others  Only allow necessary people into your home  Wear your face covering, and remind others to wear a face covering  Remind them to wash their hands when they arrive and before they leave  Do not  let someone into your home or go to someone's home just to visit  Even if you both do not feel sick, the virus can pass from one of you to the other  · Avoid in-person gatherings and crowds  Gatherings or crowds of 10 or more individuals can cause the virus to spread  Avoid places such as faustin, beaches, sporting events, and tourist attractions  For events such as parties, holiday meals, Alev[employer] services, and conferences, attend virtually (on a computer), if possible  · Ask your healthcare provider for other ways to have appointments    Some providers offer phone, video, or other types of appointments  You may also be able to get prescriptions for a few months of your medicines at a time  · Stay safe if you must go out to work  Keep physical distance between you and other workers as much as possible  Follow your employer's rules so everyone stays safe  If you have COVID-19 and are recovering at home,  healthcare providers will give you specific instructions to follow  The following are general guidelines to remind you how to keep others safe until you are well:  · Wash your hands often  Use soap and water as much as possible  Use hand  that contains alcohol if soap and water are not available  Dry your hands with a clean towel or paper towel  Do not share towels with anyone  If you use paper towels, throw them away in a lined trash can kept in your room or area  Use a covered trash can, if possible  · Do not go out of your home unless it is necessary  Ask someone who is not infected to go out for groceries or supplies, or have them delivered  Do not go to your healthcare provider's office without an appointment  · Only have close physical contact with a person giving direct care, or a baby or child you must care for  Family members and friends should not visit you  If possible, stay in a separate area or room of your home if you live with others  No one should go into the area or room except to give you care  You can visit with others by phone, video chat, e-mail, or similar systems  · Wear a face covering while others are near you  This can help prevent droplets from spreading the virus when you talk, sneeze, or cough  Put the covering on before anyone comes into your room or area  Remind the person to cover his or her nose and mouth before coming in to provide care for you  · Do not share items  Do not share dishes, towels, or other items with anyone  Items need to be washed after you use them  · Protect your baby  Some newborns have tested positive for the virus  It is not known if they became infected before or after birth  The highest risk is when a  has close contact with an infected person  If you are pregnant or breastfeeding, talk to your healthcare provider or obstetrician about any concerns you have  He or she will tell you when to bring your baby in for check-ups and vaccines  He or she will also tell you what to do if you think your baby was infected with the coronavirus  Wash your hands and put on a clean face covering before you breastfeed or care for your baby  · Do not handle live animals unless it is necessary  Some animals, including pets, have been infected with the new coronavirus  Ask someone who is not infected to take care of your pet until you are well  If you must care for a pet, wear a face covering  Wash your hands before and after you give care  Talk to your healthcare provider about how to keep a service animal safe, if needed  · Follow directions from your healthcare provider for being around others after you recover  It is not known if or for how long a recovered person can pass the virus to others  Your provider may give you instructions, such as continuing social distancing and wearing a face covering  He or she will tell you when it is okay to be around others again  This may be 10 to 20 days after symptoms started or you had a positive test  Most symptoms will also need to be gone  Your provider will give you more information  Follow up with your doctor as directed:  Write down your questions so you remember to ask them during your visits  For more information:   · Centers for Disease Control and Prevention  [address] , [address]  Phone: 5- [phone] [phone]  Web Address: [employer]    © [address] [place] [address]  Information is for End User's use only and may not be sold, redistributed or otherwise used for commercial purposes   All illustrations and images included in CareNotes® are the copyrighted property of A D A M , Inc  or Sarah Orellana   The above information is an  only  It is not intended as medical advice for individual conditions or treatments  Talk to your doctor, nurse or pharmacist before following any medical regimen to see if it is safe and effective for you

## 2021-08-04 NOTE — PROGRESS NOTES
[address] [facility]        NAME: Laura Regalado is a 60 y o  female  : 1955    MRN: [id number]  DATE: 2021  TIME: 3:20 PM    Assessment and Plan   Cough [R05]  1  Cough  [facility]ov[facility]     COVID testing completed  Patient Instructions       Patient was educated on [place] [address]  Patient was educated on the importance of hydration  Patient was educated to go to ED if she begins to have chest pain, shortness of breath or high grade fevers  Patient was educated to stay quaratined until results are back  Chief Complaint     Chief Complaint   Patient presents with    Cough     Patient c/o cough, loss of smell, sore throat and fever x 3 days          History of Present Illness       Patient presents today with cough, fever, muscle aches, fatigue, loss of smell and loss of taste , sore throat, and congestion  Denies any COVID 19 exposures  Patient reports she recently traveled to [place]  Patient was vaccinated for COVID with Mckayla Berry  Denies any chest pain or shortness of breath  Review of Systems   Review of Systems   Constitutional: Positive for fever  HENT: Positive for congestion, rhinorrhea and sore throat  Loss of taste and smell   Respiratory: Positive for cough  Cardiovascular: Negative  Musculoskeletal: Positive for myalgias  Neurological: Positive for headaches           Current Medications       Current Outpatient Medications:     anastrozole (ARIMIDEX) 1 mg tablet, Take 1 tablet (1 mg total) by mouth daily, Disp: 90 tablet, Rfl: 1    Cholecalciferol (VITAMIN D3) 1000 units CAPS, Take 1,000 capsules by mouth daily in the early morning , Disp: , Rfl:     Multiple Vitamins-Minerals (CENTRUM SILVER 50+WOMEN PO), Take 1 tablet by mouth daily in the early morning , Disp: , Rfl:     olmesartan-hydrochlorothiazide (BENICAR HCT) 40-12 5 MG per tablet, Take 1 tablet by mouth daily, Disp: 90 tablet, Rfl: 1    rosuvastatin (CRESTOR) 5 mg tablet, Take 1 tablet (5 mg total) by mouth 2 (two) times a week, Disp: 24 tablet, Rfl: 1    Current Allergies     Allergies as of 08/04/2021 - Reviewed 06/16/2021   Allergen Reaction Noted    Adhesive [medical tape]  12/27/2019            The following portions of the patient's history were reviewed and updated as appropriate: allergies, current medications, past family history, past medical history, past social history, past surgical history and problem list      Past Medical History:   Diagnosis Date    Breast cancer ([facility] [address] ) 10/2019    grade 3 ER Positive UT negative Her 2 3+ disease    History of chemotherapy 03/2020    History of radiation therapy 04/2020    Hyperlipidemia     Hypertension     Vertigo     no medication       Past Surgical History:   Procedure Laterality Date    BREAST BIOPSY Left 09/17/2019    Left U/S BX    BREAST LUMPECTOMY Left 10/29/2019    Procedure: BREAST NEEDLE LOCALIZED LUMPECTOMY (NEEDLE LOC AT 0800); Surgeon: Kandice Manrique MD;  [facility];  Service: Surgical Oncology    FRACTURE SURGERY Left     surgery repair- elbow     IR PORT PLACEMENT  12/3/2019    IR PORT REMOVAL  12/29/2020    LYMPH NODE BIOPSY Left 10/29/2019    Procedure: SENTINEL LYMPH NODE BIOPSY; LYMPHATIC MAPPING WITH BLUE DYE AND RADIOACTIVE DYE (INJECT AT 0900 BY DR PALACIOS IN THE OR);   Surgeon: Kandice Manrique MD;  [facility];  Service: Surgical Oncology    MAMMO NEEDLE LOCALIZATION LEFT (ALL INC) Left 10/29/2019    MAMMO STEREOTACTIC BREAST BIOPSY RIGHT (ALL INC) Right 9/17/2019    US GUIDANCE BREAST BIOPSY LEFT EACH ADDITIONAL Left 9/17/2019    US GUIDED BREAST BIOPSY LEFT COMPLETE Left 9/17/2019    WISDOM TOOTH EXTRACTION Bilateral        Family History   Problem Relation Age of Onset    No Known Problems Mother     Prostate cancer Father 79    Arthritis Father     No Known Problems Sister     No Known Problems Paternal Aunt     Nail disease Paternal Aunt     No Known Problems Paternal Aunt     No Known Problems Maternal Grandmother     No Known Problems Maternal Grandfather     No Known Problems Paternal Grandmother     No Known Problems Paternal Grandfather          Medications have been verified  Objective   /80   Pulse (!) 138   Temp 99 8 °F (37 7 °C)   Resp 18   Ht 5' 1" (1 549 m)   Wt 76 2 kg (168 lb)   LMP  (LMP Unknown)   SpO2 95%   BMI 31 74 kg/m²   No LMP recorded (lmp unknown)  Patient is postmenopausal        Physical Exam     Physical Exam  HENT:      Head: Normocephalic  Comments: No pain over maxillary or frontal sinus     Right Ear: Tympanic membrane, ear canal and external ear normal       Left Ear: Tympanic membrane, ear canal and external ear normal       Nose: Nose normal       Mouth/Throat:      Mouth: Mucous membranes are moist       Pharynx: No oropharyngeal exudate or posterior oropharyngeal erythema  Eyes:      Extraocular Movements: Extraocular movements intact  Pupils: Pupils are equal, round, and reactive to light  Neck:      Comments: No palpable lymph nodes  Cardiovascular:      Rate and Rhythm: Normal rate and regular rhythm  Heart sounds: Normal heart sounds  Pulmonary:      Effort: Pulmonary effort is normal       Breath sounds: Normal breath sounds  No wheezing  Abdominal:      Palpations: Abdomen is soft  Neurological:      General: No focal deficit present  Mental Status: She is alert and oriented to person, place, and time     Psychiatric:         Mood and Affect: Mood normal          Behavior: Behavior normal

## 2021-08-04 NOTE — TELEPHONE ENCOUNTER
Please place the order for the COVID swab  However I am not sure where she can go to have this done  It seems that would have been easier to ask her to come to the office to have it done  If she would like me to do that tomorrow we can do that  Please let her know  Thank you

## 2021-08-04 NOTE — TELEPHONE ENCOUNTER
Would like to know if you could put an order in for a COVID test   She is c/o a l ow grade fever, loss taste and smell, cough, sore throat for several days  She doesn't feel well enough to drive

## 2021-08-05 LAB — SARS-COV-2 RNA RESP QL NAA+PROBE: NEGATIVE

## 2021-08-06 ENCOUNTER — OFFICE VISIT (OUTPATIENT)
Dept: FAMILY MEDICINE CLINIC | Facility: CLINIC | Age: 66
End: 2021-08-06
Payer: COMMERCIAL

## 2021-08-06 DIAGNOSIS — J40 BRONCHITIS: Primary | ICD-10-CM

## 2021-08-06 DIAGNOSIS — R05.9 COUGH: ICD-10-CM

## 2021-08-06 DIAGNOSIS — R50.9 FEVER, UNSPECIFIED FEVER CAUSE: ICD-10-CM

## 2021-08-06 PROCEDURE — U0003 INFECTIOUS AGENT DETECTION BY NUCLEIC ACID (DNA OR RNA); SEVERE ACUTE RESPIRATORY SYNDROME CORONAVIRUS 2 (SARS-COV-2) (CORONAVIRUS DISEASE [COVID-19]), AMPLIFIED PROBE TECHNIQUE, MAKING USE OF HIGH THROUGHPUT TECHNOLOGIES AS DESCRIBED BY CMS-2020-01-R: HCPCS | Performed by: FAMILY MEDICINE

## 2021-08-06 PROCEDURE — U0005 INFEC AGEN DETEC AMPLI PROBE: HCPCS | Performed by: FAMILY MEDICINE

## 2021-08-06 PROCEDURE — 99213 OFFICE O/P EST LOW 20 MIN: CPT | Performed by: FAMILY MEDICINE

## 2021-08-06 RX ORDER — DOXYCYCLINE HYCLATE 100 MG/1
100 CAPSULE ORAL EVERY 12 HOURS SCHEDULED
Qty: 14 CAPSULE | Refills: 0 | Status: SHIPPED | OUTPATIENT
Start: 2021-08-06 | End: 2021-08-13

## 2021-08-06 RX ORDER — GUAIFENESIN AND CODEINE PHOSPHATE 100; 10 MG/5ML; MG/5ML
5 SOLUTION ORAL 3 TIMES DAILY PRN
Qty: 120 ML | Refills: 0 | Status: SHIPPED | OUTPATIENT
Start: 2021-08-06 | End: 2022-01-11 | Stop reason: ALTCHOICE

## 2021-08-06 NOTE — PROGRESS NOTES
Assessment/Plan:  Patient will self quarantine as directed  Patient will continue with cough medications as needed  Patient will rest and increase fluids  Patient will be tested for COVID-19 a present time  Patient use ibuprofen/ Tylenol as needed for fever  Diagnoses and all orders for this visit:    Bronchitis  -     doxycycline hyclate (VIBRAMYCIN) 100 mg capsule; Take 1 capsule (100 mg total) by mouth every 12 (twelve) hours for 7 days  -     guaifenesin-codeine (GUAIFENESIN AC) 100-10 MG/5ML liquid; Take 5 mL by mouth 3 (three) times a day as needed for cough            Subjective:        Patient ID: Pravin Goff is a 60 y o  female  Patient is here with URI symptoms since Friday  Patient was sinus pressure  Patient also fatigue  Patient with a 102 4 yesterday  Patient did lose smell and taste for 1 day  Patient did test negative for COVID on the 4th  No new myalgias or arthralgias  No vomiting or diarrhea  No chest pain or shortness of breath  Patient did use ibuprofen  Patient did use over-the-counter cough syrup  Patient has had COVID vaccine  Patient does have cough and some sputum production  The following portions of the patient's history were reviewed and updated as appropriate: allergies, current medications, past family history, past medical history, past social history, past surgical history and problem list       Review of Systems   Constitutional: Positive for fever  HENT: Positive for congestion, postnasal drip, rhinorrhea, sinus pressure and sinus pain  Eyes: Negative  Respiratory: Positive for cough  Cardiovascular: Negative  Gastrointestinal: Negative  Endocrine: Negative  Genitourinary: Negative  Musculoskeletal: Negative  Skin: Negative  Allergic/Immunologic: Negative  Neurological: Negative  Hematological: Negative  Psychiatric/Behavioral: Negative              Objective:               LMP  (LMP Unknown)          Physical Exam  Vitals and nursing note reviewed  Constitutional:       General: She is not in acute distress  Appearance: Normal appearance  She is not ill-appearing, toxic-appearing or diaphoretic  HENT:      Head: Normocephalic and atraumatic  Right Ear: Tympanic membrane, ear canal and external ear normal  There is no impacted cerumen  Left Ear: Tympanic membrane, ear canal and external ear normal  There is no impacted cerumen  Nose: Nose normal  No congestion or rhinorrhea  Mouth/Throat:      Mouth: Mucous membranes are moist       Pharynx: Oropharyngeal exudate present  No posterior oropharyngeal erythema  Eyes:      General: No scleral icterus  Right eye: No discharge  Left eye: No discharge  Extraocular Movements: Extraocular movements intact  Conjunctiva/sclera: Conjunctivae normal       Pupils: Pupils are equal, round, and reactive to light  Neck:      Vascular: No carotid bruit  Cardiovascular:      Rate and Rhythm: Normal rate and regular rhythm  Pulses: Normal pulses  Heart sounds: Normal heart sounds  No murmur heard  No friction rub  No gallop  Pulmonary:      Effort: Pulmonary effort is normal  No respiratory distress  Breath sounds: No stridor  Rhonchi present  No wheezing or rales  Comments: Scattered rhonchi throughout  Chest:      Chest wall: No tenderness  Musculoskeletal:         General: No swelling, tenderness, deformity or signs of injury  Normal range of motion  Cervical back: Normal range of motion and neck supple  No rigidity  No muscular tenderness  Right lower leg: No edema  Left lower leg: No edema  Lymphadenopathy:      Cervical: No cervical adenopathy  Skin:     General: Skin is warm and dry  Capillary Refill: Capillary refill takes less than 2 seconds  Coloration: Skin is not jaundiced  Findings: No bruising, erythema, lesion or rash     Neurological:      Mental Status: She is alert and oriented to person, place, and time  Mental status is at baseline  Cranial Nerves: No cranial nerve deficit  Sensory: No sensory deficit  Motor: No weakness  Coordination: Coordination normal       Gait: Gait normal    Psychiatric:         Mood and Affect: Mood normal          Behavior: Behavior normal          Thought Content:  Thought content normal          Judgment: Judgment normal

## 2021-08-07 LAB — SARS-COV-2 RNA RESP QL NAA+PROBE: NEGATIVE

## 2021-09-01 DIAGNOSIS — E78.2 MIXED HYPERLIPIDEMIA: ICD-10-CM

## 2021-09-01 DIAGNOSIS — I10 ESSENTIAL HYPERTENSION: ICD-10-CM

## 2021-09-01 RX ORDER — ROSUVASTATIN CALCIUM 5 MG/1
5 TABLET, COATED ORAL 2 TIMES WEEKLY
Qty: 24 TABLET | Refills: 0 | Status: SHIPPED | OUTPATIENT
Start: 2021-09-02 | End: 2021-12-15 | Stop reason: SDUPTHER

## 2021-09-01 RX ORDER — OLMESARTAN MEDOXOMIL AND HYDROCHLOROTHIAZIDE 40/12.5 40; 12.5 MG/1; MG/1
1 TABLET ORAL DAILY
Qty: 90 TABLET | Refills: 0 | Status: SHIPPED | OUTPATIENT
Start: 2021-09-01 | End: 2021-11-19 | Stop reason: SDUPTHER

## 2021-10-03 DIAGNOSIS — D70.1 CHEMOTHERAPY INDUCED NEUTROPENIA (HCC): ICD-10-CM

## 2021-10-03 DIAGNOSIS — Z17.0 MALIGNANT NEOPLASM OF LOWER-OUTER QUADRANT OF LEFT BREAST OF FEMALE, ESTROGEN RECEPTOR POSITIVE (HCC): ICD-10-CM

## 2021-10-03 DIAGNOSIS — T45.1X5A CHEMOTHERAPY INDUCED NEUTROPENIA (HCC): ICD-10-CM

## 2021-10-03 DIAGNOSIS — C50.512 MALIGNANT NEOPLASM OF LOWER-OUTER QUADRANT OF LEFT BREAST OF FEMALE, ESTROGEN RECEPTOR POSITIVE (HCC): ICD-10-CM

## 2021-10-04 ENCOUNTER — TELEPHONE (OUTPATIENT)
Dept: HEMATOLOGY ONCOLOGY | Facility: CLINIC | Age: 66
End: 2021-10-04

## 2021-10-04 DIAGNOSIS — Z17.0 MALIGNANT NEOPLASM OF LOWER-OUTER QUADRANT OF LEFT BREAST OF FEMALE, ESTROGEN RECEPTOR POSITIVE (HCC): ICD-10-CM

## 2021-10-04 DIAGNOSIS — T45.1X5A CHEMOTHERAPY INDUCED NEUTROPENIA (HCC): ICD-10-CM

## 2021-10-04 DIAGNOSIS — C50.512 MALIGNANT NEOPLASM OF LOWER-OUTER QUADRANT OF LEFT BREAST OF FEMALE, ESTROGEN RECEPTOR POSITIVE (HCC): ICD-10-CM

## 2021-10-04 DIAGNOSIS — D70.1 CHEMOTHERAPY INDUCED NEUTROPENIA (HCC): ICD-10-CM

## 2021-10-04 RX ORDER — ANASTROZOLE 1 MG/1
1 TABLET ORAL DAILY
Qty: 90 TABLET | Refills: 1 | Status: SHIPPED | OUTPATIENT
Start: 2021-10-04 | End: 2022-03-03 | Stop reason: SDUPTHER

## 2021-10-04 RX ORDER — ANASTROZOLE 1 MG/1
1 TABLET ORAL DAILY
Qty: 90 TABLET | Refills: 0 | Status: CANCELLED | OUTPATIENT
Start: 2021-10-04

## 2021-11-03 ENCOUNTER — HOSPITAL ENCOUNTER (OUTPATIENT)
Dept: MAMMOGRAPHY | Facility: CLINIC | Age: 66
Discharge: HOME | End: 2021-11-03
Payer: COMMERCIAL

## 2021-11-03 VITALS — HEIGHT: 61 IN | WEIGHT: 168 LBS | BODY MASS INDEX: 31.72 KG/M2

## 2021-11-03 DIAGNOSIS — Z17.0 MALIGNANT NEOPLASM OF LOWER-OUTER QUADRANT OF LEFT BREAST OF FEMALE, ESTROGEN RECEPTOR POSITIVE (HCC): ICD-10-CM

## 2021-11-03 DIAGNOSIS — C50.512 MALIGNANT NEOPLASM OF LOWER-OUTER QUADRANT OF LEFT BREAST OF FEMALE, ESTROGEN RECEPTOR POSITIVE (HCC): ICD-10-CM

## 2021-11-03 PROCEDURE — G0279 TOMOSYNTHESIS, MAMMO: HCPCS

## 2021-11-03 PROCEDURE — 77066 DX MAMMO INCL CAD BI: CPT

## 2021-11-19 DIAGNOSIS — I10 ESSENTIAL HYPERTENSION: ICD-10-CM

## 2021-11-19 RX ORDER — OLMESARTAN MEDOXOMIL AND HYDROCHLOROTHIAZIDE 40/12.5 40; 12.5 MG/1; MG/1
1 TABLET ORAL DAILY
Qty: 90 TABLET | Refills: 0 | Status: SHIPPED | OUTPATIENT
Start: 2021-11-19 | End: 2022-05-02 | Stop reason: SDUPTHER

## 2021-12-15 DIAGNOSIS — E78.2 MIXED HYPERLIPIDEMIA: ICD-10-CM

## 2021-12-15 RX ORDER — ROSUVASTATIN CALCIUM 5 MG/1
5 TABLET, COATED ORAL 2 TIMES WEEKLY
Qty: 24 TABLET | Refills: 0 | Status: SHIPPED | OUTPATIENT
Start: 2021-12-16 | End: 2022-07-11 | Stop reason: SDUPTHER

## 2022-01-11 ENCOUNTER — OFFICE VISIT (OUTPATIENT)
Dept: FAMILY MEDICINE CLINIC | Facility: CLINIC | Age: 67
End: 2022-01-11
Payer: COMMERCIAL

## 2022-01-11 VITALS
SYSTOLIC BLOOD PRESSURE: 130 MMHG | BODY MASS INDEX: 33.72 KG/M2 | HEIGHT: 61 IN | TEMPERATURE: 97.8 F | WEIGHT: 178.6 LBS | DIASTOLIC BLOOD PRESSURE: 86 MMHG

## 2022-01-11 DIAGNOSIS — T45.1X5A CHEMOTHERAPY INDUCED NEUTROPENIA (HCC): ICD-10-CM

## 2022-01-11 DIAGNOSIS — I10 PRIMARY HYPERTENSION: Primary | ICD-10-CM

## 2022-01-11 DIAGNOSIS — E78.2 MIXED HYPERLIPIDEMIA: ICD-10-CM

## 2022-01-11 DIAGNOSIS — C50.512 MALIGNANT NEOPLASM OF LOWER-OUTER QUADRANT OF LEFT BREAST OF FEMALE, ESTROGEN RECEPTOR POSITIVE (HCC): ICD-10-CM

## 2022-01-11 DIAGNOSIS — D70.1 CHEMOTHERAPY INDUCED NEUTROPENIA (HCC): ICD-10-CM

## 2022-01-11 DIAGNOSIS — Z17.0 MALIGNANT NEOPLASM OF LOWER-OUTER QUADRANT OF LEFT BREAST OF FEMALE, ESTROGEN RECEPTOR POSITIVE (HCC): ICD-10-CM

## 2022-01-11 PROBLEM — J40 BRONCHITIS: Status: RESOLVED | Noted: 2021-08-06 | Resolved: 2022-01-11

## 2022-01-11 PROCEDURE — 99214 OFFICE O/P EST MOD 30 MIN: CPT | Performed by: FAMILY MEDICINE

## 2022-01-11 NOTE — PROGRESS NOTES
Assessment/Plan:  She is doing well  Continue current therapy  We discussed immunizations including shingles vaccine, pneumonia vaccine and flu vaccine  She did have the flu vaccine  She will consider the others at a later date  We also discussed a DEXA scan she will consider that  She will follow-up with her oncologist   See her back in 6 months sooner if needed  Diagnoses and all orders for this visit:    Primary hypertension    Malignant neoplasm of lower-outer quadrant of left breast of female, estrogen receptor positive ([facility] [address] )    Chemotherapy induced neutropenia ([facility] [address] )    Mixed hyperlipidemia            Subjective:        Patient ID: Sherrine Schaumann is a 73 y o  female  She is in today for follow-up  She is feeling well  Continues to follow with Oncology and breast surgery  She had recent mammogram that was normal         The following portions of the patient's history were reviewed and updated as appropriate: allergies, current medications, past family history, past medical history, past social history, past surgical history and problem list       Review of Systems   Constitutional: Negative  HENT: Negative  Eyes: Negative  Respiratory: Negative  Cardiovascular: Negative  Gastrointestinal: Negative  Endocrine: Negative  Genitourinary: Negative  Musculoskeletal: Negative  Skin: Negative  Allergic/Immunologic: Negative  Neurological: Negative  Hematological: Negative  Psychiatric/Behavioral: Negative  All other systems reviewed and are negative  Objective:      BMI Counseling: Body mass index is 33 75 kg/m²   The BMI is above normal  Nutrition recommendations include decreasing portion sizes, encouraging healthy choices of fruits and vegetables, decreasing fast food intake, consuming healthier snacks, limiting drinks that contain sugar, moderation in carbohydrate intake, increasing intake of lean protein, reducing intake of saturated and trans fat and reducing intake of cholesterol  Exercise recommendations include moderate physical activity 150 minutes/week  No pharmacotherapy was ordered  Rationale for BMI follow-up plan is due to patient being overweight or obese  /86   Temp 97 8 °F (36 6 °C)   Ht 5' 1" (1 549 m)   Wt 81 kg (178 lb 9 6 oz)   LMP  (LMP Unknown)   BMI 33 75 kg/m²          Physical Exam  Vitals and nursing note reviewed  Constitutional:       Appearance: She is well-developed  HENT:      Head: Normocephalic and atraumatic  Right Ear: External ear normal       Left Ear: External ear normal       Nose: Nose normal    Eyes:      Conjunctiva/sclera: Conjunctivae normal       Pupils: Pupils are equal, round, and reactive to light  Cardiovascular:      Rate and Rhythm: Normal rate and regular rhythm  Heart sounds: Normal heart sounds  Pulmonary:      Effort: Pulmonary effort is normal       Breath sounds: Normal breath sounds  Abdominal:      General: Bowel sounds are normal       Palpations: Abdomen is soft  Musculoskeletal:         General: Normal range of motion  Cervical back: Normal range of motion and neck supple  Skin:     General: Skin is warm and dry  Neurological:      Mental Status: She is alert and oriented to person, place, and time  Deep Tendon Reflexes: Reflexes are normal and symmetric     Psychiatric:         Behavior: Behavior normal

## 2022-01-31 ENCOUNTER — OFFICE VISIT (OUTPATIENT)
Dept: SURGICAL ONCOLOGY | Facility: CLINIC | Age: 67
End: 2022-01-31
Payer: COMMERCIAL

## 2022-01-31 VITALS
TEMPERATURE: 97.3 F | RESPIRATION RATE: 18 BRPM | OXYGEN SATURATION: 97 % | BODY MASS INDEX: 33.95 KG/M2 | WEIGHT: 179.8 LBS | HEART RATE: 110 BPM | SYSTOLIC BLOOD PRESSURE: 122 MMHG | HEIGHT: 61 IN | DIASTOLIC BLOOD PRESSURE: 84 MMHG

## 2022-01-31 DIAGNOSIS — Z79.811 USE OF ANASTROZOLE: ICD-10-CM

## 2022-01-31 DIAGNOSIS — C50.512 MALIGNANT NEOPLASM OF LOWER-OUTER QUADRANT OF LEFT BREAST OF FEMALE, ESTROGEN RECEPTOR POSITIVE (HCC): Primary | ICD-10-CM

## 2022-01-31 DIAGNOSIS — Z17.0 MALIGNANT NEOPLASM OF LOWER-OUTER QUADRANT OF LEFT BREAST OF FEMALE, ESTROGEN RECEPTOR POSITIVE (HCC): Primary | ICD-10-CM

## 2022-01-31 PROCEDURE — 99214 OFFICE O/P EST MOD 30 MIN: CPT | Performed by: NURSE PRACTITIONER

## 2022-01-31 NOTE — PROGRESS NOTES
Surgical Oncology Follow Up       [address] [facility] SUR[id number]  [facility] [address]    Wily Reid  1955  [id number]  [facility] SURGICAL ONCOLOGY [place]  [place] [id number]    Chief Complaint   Patient presents with    Follow-up       Assessment/Plan:  1  Malignant neoplasm of lower-outer quadrant of left breast of female, estrogen receptor positive ([address] )  - 6 mo f/u visit    2  Use of anastrozole  - Continue use per medical oncology    Discussion/Summary: Patient is a 28-year-old female who presents today for a six-month follow-up visit for left breast cancer diagnosed in September of 2019  Her pathology revealed invasive ductal carcinoma, ER 70%, AR negative, HER2 positive   She underwent genetic testing which was negative   She underwent a left lumpectomy and sentinel node biopsy by Dr Tomasa Salguero completed adjuvant chemotherapy, Herceptin monotherapy and adjuvant RT  She is currently taking anastrozole and tolerating this well  She had a bilateral 3D diagnostic mammogram on November 3, 2021 which was BIRADS 1, category 2 density  She complains today of ongoing weight gain as well as intermittent swelling of her left breast   In regards to her weight, we discussed a referral to our strength ABC program as well as our medical weight management team   Patient will continue with her current regimen but will contact us if she is interested in a referral   In regards to breast edema,  I encouraged her to wear supportive bra and consider a referral to PT/lymphedema therapy if symptoms persist/worsen  There are no worrisome findings on her exam today  I will plan to see the patient back in 6 months or sooner should the need arise  She was instructed to call with any new concerns or symptoms prior to that time  All of her questions were answered today      History of Present Illness: Oncology History   Malignant neoplasm of lower-outer quadrant of left breast of female, estrogen receptor positive ([facility] [address] )   9/17/2019 Biopsy    Left breast ultrasound-guided biospy  A  5 o'clock, 7 cm from nipple  Invasive mammary carcinoma of no special type (ductal, not otherwise specified)  Grade 1  ER 70  OH < 1  HER2 3+    B  1 o'clock, 5 cm from nipple  Benign, fibrocystic changes without atypia  Involving intraductal micropapilloma  No evidence of malignancy    Right breast stereotactic biopsy  C  Right breast without calcifications  Benign, fibrocystic changes without atypia  No evidence of malignancy    Concordant  Unifocal; measures 2 cm on US  Left axilla negative  Right breast clear  Carcinoma with butterfly shaped clip      10/10/2019 Genetic Testing    The following genes were evaluated: MOI, BRCA1, BRCA2, CDH1, CHEK2, PALB2, PTEN, STK11, TP53  Negative result   No pathogenic sequence variants or deletions/dupllications identified  [employer]     10/29/2019 Surgery    Left breast needle localized lumpectomy with sentinel lymph node biopsy  Invasive mammary carcinoma of no special type (ductal, not otherwise specified)  Grade 3  2 2 cm  Margins negative  0/1 Lymph Nodes  Anatomic/Prognostic Stage IIA     12/6/2019 - 12/17/2020 Chemotherapy    pegfilgrastim (NEULASTA) subcutaneous injection 6 mg, 6 mg, Subcutaneous, Once, 1 of 1 cycle  Administration: 6 mg (12/30/2019)  pegfilgrastim (NEULASTA ONPRO) subcutaneous injection kit 6 mg, 6 mg, Subcutaneous, Once, 6 of 6 cycles  Administration: 6 mg (12/6/2019), 6 mg (12/27/2019), 6 mg (1/17/2020), 6 mg (2/7/2020), 6 mg (2/28/2020), 6 mg (3/20/2020)  fosaprepitant (EMEND) 150 mg in sodium chloride 0 9 % 255 mL IVPB, 150 mg, Intravenous, Once, 6 of 6 cycles  Administration: 150 mg (12/6/2019), 150 mg (12/27/2019), 150 mg (1/17/2020), 150 mg (2/7/2020), 150 mg (2/28/2020), 150 mg (3/20/2020)  CARBOplatin (PARAPLATIN) 616 8 mg in sodium chloride 0 9 % 250 mL IVPB, 616 8 mg, Intravenous, Once, 6 of 6 cycles  Administration: 616 8 mg (12/6/2019), 616 8 mg (12/27/2019), 616 8 mg (1/17/2020), 616 8 mg (2/7/2020), 616 8 mg (2/28/2020), 616 8 mg (3/20/2020)  DOCEtaxel (TAXOTERE) 106 2 mg in sodium chloride 0 9 % 250 mL chemo infusion, 60 mg/m2 = 106 2 mg (80 % of original dose 75 mg/m2), Intravenous, Once, 6 of 6 cycles  Dose modification: 60 mg/m2 (original dose 75 mg/m2, Cycle 1, Reason: Dose Not Tolerated), 75 mg/m2 (original dose 75 mg/m2, Cycle 2, Reason: Other (Must fill in a comment), Comment: LFT normalized  )  Administration: 106 2 mg (12/6/2019), 132 8 mg (12/27/2019), 132 8 mg (1/17/2020), 132 8 mg (2/7/2020), 132 8 mg (2/28/2020), 132 8 mg (3/20/2020)  trastuzumab (HERCEPTIN) 600 mg in sodium chloride 0 9 % 250 mL chemo infusion, 624 mg, Intravenous, Once, 18 of 18 cycles  Administration: 600 mg (12/6/2019), 450 mg (12/27/2019), 450 mg (4/10/2020), 450 mg (1/17/2020), 450 mg (2/7/2020), 450 mg (2/28/2020), 450 mg (3/20/2020), 450 mg (5/1/2020), 450 mg (5/22/2020), 450 mg (6/12/2020), 450 mg (7/2/2020), 450 mg (7/24/2020), 450 mg (8/14/2020), 450 mg (9/4/2020), 450 mg (9/28/2020), 450 mg (10/16/2020), 450 mg (11/6/2020), 450 mg (11/27/2020)     5/4/2020 - 6/2/2020 Radiation    Plan ID Energy Fractions Dose per Fraction (cGy) Dose Correction (cGy) Total Dose Delivered (cGy) Elapsed Days   BH L BOOST 6X 5 / 5 200 0 1,000 6   BH L BREAST 6X 16 / 16 266 0 4,256 22               -Interval History:  Patient presents today for follow-up visit for left breast cancer  She notices no changes on self-breast exam with the exception of intermittent swelling of the left breast   She had a bilateral mammogram in November which was BI-RADS 2  She continues to walk 10 miles a week and follow a primarily plant based diet but reports difficulty losing weight  She states she has gained 16 lb since the completion of chemotherapy    She denies persistent headaches, back pain or bone pain, cough or shortness of breath, abdominal pain  Review of Systems:  Review of Systems   Constitutional: Negative for activity change, appetite change, chills, fatigue (improving), fever and unexpected weight change  Respiratory: Negative for cough and shortness of breath  Cardiovascular: Negative for chest pain  Gastrointestinal: Negative for abdominal pain, constipation, diarrhea, nausea and vomiting  Musculoskeletal: Negative for arthralgias, back pain, gait problem and myalgias  Skin: Negative for color change and rash  Neurological: Negative for dizziness and headaches  Hematological: Negative for adenopathy  Psychiatric/Behavioral: Negative for agitation and confusion  All other systems reviewed and are negative  Patient Active Problem List   Diagnosis    Malignant neoplasm of lower-outer quadrant of left breast of female, estrogen receptor positive ([facility] [address] )    Chemotherapy induced neutropenia ([facility] [address] )    Port-A-Cath in place    Hypertension    Hyperlipidemia    Use of anastrozole     Past Medical History:   Diagnosis Date    Breast cancer ([facility] [address] ) 10/2019    grade 3 ER Positive UT negative Her 2 3+ disease left    History of chemotherapy 03/2020    History of radiation therapy 04/2020    Hyperlipidemia     Hypertension     Vertigo     no medication     Past Surgical History:   Procedure Laterality Date    BREAST BIOPSY Left 09/17/2019    Left U/S BX invasive mammary carcinoma    BREAST BIOPSY Right 09/17/2021    benign    BREAST LUMPECTOMY Left 10/29/2019    Procedure: BREAST NEEDLE LOCALIZED LUMPECTOMY (NEEDLE LOC AT 0800);   Surgeon: Gerard Henderson MD;  [facility]: [facility];  Service: Surgical Oncology    FRACTURE SURGERY Left     surgery repair- elbow     IR PORT PLACEMENT  12/3/2019    IR PORT REMOVAL  12/29/2020    LYMPH NODE BIOPSY Left 10/29/2019    Procedure: SENTINEL LYMPH NODE BIOPSY; LYMPHATIC MAPPING WITH BLUE DYE AND RADIOACTIVE DYE (INJECT AT 0900 BY DR PALACIOS IN THE OR); Surgeon: Ashley Heller MD;  Location: [facility];  Service: Surgical Oncology    MAMMO NEEDLE LOCALIZATION LEFT (ALL INC) Left 10/29/2019    MAMMO STEREOTACTIC BREAST BIOPSY RIGHT (ALL INC) Right 2019    US GUIDANCE BREAST BIOPSY LEFT EACH ADDITIONAL Left 2019    US GUIDED BREAST BIOPSY LEFT COMPLETE Left 2019    WISDOM TOOTH EXTRACTION Bilateral      Family History   Problem Relation Age of Onset    No Known Problems Mother     Prostate cancer Father 79    Arthritis Father     No Known Problems Sister     No Known Problems Paternal Aunt     Nail disease Paternal Aunt     No Known Problems Paternal Aunt     No Known Problems Maternal Grandmother     No Known Problems Maternal Grandfather     No Known Problems Paternal Grandmother     No Known Problems Paternal Grandfather      Social History     Socioeconomic History    Marital status: /Civil Union     Spouse name: Not on file    Number of children: Not on file    Years of education: Not on file    Highest education level: Not on file   Occupational History    Not on file   Tobacco Use    Smoking status: Former Smoker     Packs/day: 0 75     Years: 38 00     Pack years: 28 50     Quit date:      Years since quittin 0    Smokeless tobacco: Never Used   Vaping Use    Vaping Use: Never used   Substance and Sexual Activity    Alcohol use:  Yes    Drug use: Never    Sexual activity: Yes     Partners: Female     Birth control/protection: None, Post-menopausal   Other Topics Concern    Not on file   Social History Narrative    Not on file     Social Determinants of Health     Financial Resource Strain: Not on file   Food Insecurity: Not on file   Transportation Needs: Not on file   Physical Activity: Not on file   Stress: Not on file   Social Connections: Not on file   Intimate Partner Violence: Not on file   Housing Stability: Not on file       Current Outpatient Medications:     anastrozole (ARIMIDEX) 1 mg tablet, Take 1 tablet (1 mg total) by mouth daily, Disp: 90 tablet, Rfl: 1    Cholecalciferol (VITAMIN D3) 1000 units CAPS, Take 1,000 capsules by mouth daily in the early morning , Disp: , Rfl:     Multiple Vitamins-Minerals (CENTRUM SILVER 50+WOMEN PO), Take 1 tablet by mouth daily in the early morning , Disp: , Rfl:     olmesartan-hydrochlorothiazide (BENICAR HCT) 40-12 5 MG per tablet, Take 1 tablet by mouth daily, Disp: 90 tablet, Rfl: 0    rosuvastatin (CRESTOR) 5 mg tablet, Take 1 tablet (5 mg total) by mouth 2 (two) times a week, Disp: 24 tablet, Rfl: 0  Allergies   Allergen Reactions    Adhesive [Medical Tape]      Vitals:    01/31/22 0954   BP: 122/84   Pulse: (!) 110   Resp: 18   Temp: (!) 97 3 °F (36 3 °C)   SpO2: 97%       Physical Exam  Vitals reviewed  Constitutional:       General: She is not in acute distress  Appearance: Normal appearance  She is well-developed  She is not diaphoretic  HENT:      Head: Normocephalic and atraumatic  Cardiovascular:      Rate and Rhythm: Normal rate and regular rhythm  Heart sounds: Normal heart sounds  Pulmonary:      Effort: Pulmonary effort is normal       Breath sounds: Normal breath sounds  Chest:   Breasts:      Right: No swelling, bleeding, inverted nipple, mass, nipple discharge, skin change, tenderness, axillary adenopathy or supraclavicular adenopathy  Left: Swelling (breast edema, skin thickening s/p RT, mild) and skin change (surgical scars- breast, axilla) present  No bleeding, inverted nipple, mass, nipple discharge, tenderness, axillary adenopathy or supraclavicular adenopathy  Abdominal:      Palpations: Abdomen is soft  There is no mass  Tenderness: There is no abdominal tenderness  Musculoskeletal:         General: Normal range of motion  Cervical back: Normal range of motion  Lymphadenopathy:      Upper Body:      Right upper body: No supraclavicular or axillary adenopathy        Left upper body: No supraclavicular or axillary adenopathy  Skin:     General: Skin is warm and dry  Findings: No rash  Neurological:      Mental Status: She is alert and oriented to person, place, and time  Psychiatric:         Speech: Speech normal          Advance Care Planning/Advance Directives:  Discussed disease status, cancer treatment plans and/or cancer treatment goals with the patient

## 2022-02-04 ENCOUNTER — TELEPHONE (OUTPATIENT)
Dept: HEMATOLOGY ONCOLOGY | Facility: CLINIC | Age: 67
End: 2022-02-04

## 2022-02-04 NOTE — TELEPHONE ENCOUNTER
I called the patient and left a message with the information on a slight change in the schedule for her appointment on 6/06 that was moved to 11 am  I then voiced to call the office to reschedule the appointment if it does not work

## 2022-03-03 DIAGNOSIS — Z17.0 MALIGNANT NEOPLASM OF LOWER-OUTER QUADRANT OF LEFT BREAST OF FEMALE, ESTROGEN RECEPTOR POSITIVE (HCC): ICD-10-CM

## 2022-03-03 DIAGNOSIS — C50.512 MALIGNANT NEOPLASM OF LOWER-OUTER QUADRANT OF LEFT BREAST OF FEMALE, ESTROGEN RECEPTOR POSITIVE (HCC): ICD-10-CM

## 2022-03-03 DIAGNOSIS — T45.1X5A CHEMOTHERAPY INDUCED NEUTROPENIA (HCC): ICD-10-CM

## 2022-03-03 DIAGNOSIS — D70.1 CHEMOTHERAPY INDUCED NEUTROPENIA (HCC): ICD-10-CM

## 2022-03-03 RX ORDER — ANASTROZOLE 1 MG/1
1 TABLET ORAL DAILY
Qty: 90 TABLET | Refills: 1 | Status: SHIPPED | OUTPATIENT
Start: 2022-03-03

## 2022-05-02 DIAGNOSIS — I10 ESSENTIAL HYPERTENSION: ICD-10-CM

## 2022-05-02 RX ORDER — OLMESARTAN MEDOXOMIL AND HYDROCHLOROTHIAZIDE 40/12.5 40; 12.5 MG/1; MG/1
1 TABLET ORAL DAILY
Qty: 90 TABLET | Refills: 0 | Status: SHIPPED | OUTPATIENT
Start: 2022-05-02 | End: 2022-07-11 | Stop reason: SDUPTHER

## 2022-05-26 ENCOUNTER — TELEPHONE (OUTPATIENT)
Dept: HEMATOLOGY ONCOLOGY | Facility: CLINIC | Age: 67
End: 2022-05-26

## 2022-05-26 NOTE — TELEPHONE ENCOUNTER
Left voicemail for patient in regards to new appointment  Provided details of new date and time of appointment  Left [facility] number in case of need to reschedule

## 2022-06-23 ENCOUNTER — OFFICE VISIT (OUTPATIENT)
Dept: HEMATOLOGY ONCOLOGY | Facility: CLINIC | Age: 67
End: 2022-06-23
Payer: COMMERCIAL

## 2022-06-23 VITALS
HEIGHT: 60 IN | SYSTOLIC BLOOD PRESSURE: 134 MMHG | TEMPERATURE: 98.6 F | RESPIRATION RATE: 18 BRPM | OXYGEN SATURATION: 97 % | WEIGHT: 165 LBS | BODY MASS INDEX: 32.39 KG/M2 | HEART RATE: 97 BPM | DIASTOLIC BLOOD PRESSURE: 78 MMHG

## 2022-06-23 DIAGNOSIS — Z17.0 MALIGNANT NEOPLASM OF LOWER-OUTER QUADRANT OF LEFT BREAST OF FEMALE, ESTROGEN RECEPTOR POSITIVE (HCC): Primary | ICD-10-CM

## 2022-06-23 DIAGNOSIS — C50.512 MALIGNANT NEOPLASM OF LOWER-OUTER QUADRANT OF LEFT BREAST OF FEMALE, ESTROGEN RECEPTOR POSITIVE (HCC): Primary | ICD-10-CM

## 2022-06-23 PROCEDURE — 99214 OFFICE O/P EST MOD 30 MIN: CPT | Performed by: INTERNAL MEDICINE

## 2022-06-23 NOTE — PROGRESS NOTES
Hematology / Oncology Outpatient Follow Up Note    Ashtyn Acevedo 73 y o  female :1955 VYT:[id number]         Date:  2022    Assessment / Plan:  A 72-year-old postmenopausal woman with stage IIA left breast cancer, grade 3, ER 70% positive, CT negative, HER2 3+ disease   She underwent lumpectomy and sentinel lymph node biopsy, resulting in GREG   She is negative for BRCA gene mutation   She completed 6 cycle of adjuvant chemotherapy with TCH with excellent tolerance followed by adjuvant trastuzumab monotherapy  She is currently on adjuvant hormonal therapy with anastrozole with no toxicity  She has no evidence of recurrent disease, based on her symptoms and physical examination  I recommended her to continue anastrozole 1 mg once a day  I will see her again in a year for routine follow-up  She is in agreement with my recommendations         Subjective:      HPI:  A 72-year-old postmenopausal woman who noticed a lump in her left breast which she brought to medical attention   She underwent left breast biopsy in 2019 which showed invasive ductal carcinoma, grade 1  This was ER 70% positive, CT negative, HER2 3+ disease   She underwent genetic testing which was negative for BRCA gene mutation   She underwent lumpectomy and sentinel lymph node biopsy by Dr Lalo Licona to this, she had CT scan of chest abdomen pelvis which showed no obvious distant metastasis   However, she has multiple indeterminate liver lesions   Her LFT is mildly elevated   She has no history of viral hepatitis   However, she drinks 2 alcohol every day for nearly 40 years   Intermittently, she drinks more   Lumpectomy showed 2 2 cm of invasive ductal carcinoma, grade 3   There was no evidence of lymphovascular invasion   1 sentinel lymph node was negative for metastatic disease   She presents today to discuss adjuvant treatment options   She has no complaint of pain   Her weight is stable   She has no respiratory jessica  Will Adeel has no family history of breast or ovarian cancer   She quit smoking nearly 10 years ago  [employer] performance status is normal            Interval History:  A 78-year-old postmenopausal woman with stage IIA left breast cancer, grade 3, ER 70% positive, TN negative, HER2 3+ disease   She underwent lumpectomy and sentinel lymph node biopsy, resulting in GREG   She is negative for BRCA gene mutation   She completed 6 cycle of adjuvant chemotherapy with [address] with excellent tolerance in March 2020  She completed trastuzumab adjuvant monotherapy in November 2020 without cardiac toxicity  She is currently on adjuvant hormonal therapy with anastrozole   She presents today for routine follow-up  She feels well with no new complaint  She is tolerating anastrozole well  She has no hot flashes or musculoskeletal symptoms  She denied bone pain  She has no respiratory symptoms such as cough, sputum production or shortness of breath  Her weight is stable    She has normal performance status         Objective:      Primary Diagnosis:     1  Left breast cancer, stage IIA (pT2, pN0, M0) grade 3, ER 70% positive, TN negative, HER2 3+ disease   Diagnosed in October 2019       2  BRCA gene mutation negative      Cancer Staging:  Cancer Staging  Malignant neoplasm of lower-outer quadrant of left breast of female, estrogen receptor positive ([facility] [address] )  Staging form: Breast, AJCC 8th Edition  - Pathologic: Stage IIA (pT2, pN0(sn), cM0, G3, ER+, TN-, HER2+) - Unsigned  Neoadjuvant therapy: No  Laterality: Left  Tumor size (mm): 22  Method of lymph node assessment: Cobb lymph node biopsy  Histologic grading system: 3 grade system           Previous Hematologic/ Oncologic Treatment:       1  Adjuvant chemotherapy with TCH x6 cycle   Completed in March 2020       2  Adjuvant trastuzumab monotherapy , completed in November 2020       Current Hematologic/ Oncologic Treatment:       1  Adjuvant hormonal therapy with anastrozole since June 2020        Disease Status:      GREG is status post lumpectomy and sentinel lymph node biopsy      Test Results:     Pathology:     2 2 cm of invasive ductal carcinoma, grade 3  No evidence of lymphovascular invasion   1 sentinel lymph node was negative for metastatic disease   ER 70% positive, HI negative, HER2 3+ disease   Stage IIA (pT2, pN0, M0)     Radiology:     Mammography in November 2021 was benign   BI-RADS 2          Laboratory:     See below      Physical Exam:        General Appearance:    Alert, oriented          Eyes:    PERRL   Ears:    Normal external ear canals, both ears   Nose:   Nares normal, septum midline   Throat:   Mucosa moist  Pharynx without injection  Neck:   Supple         Lungs:     Clear to auscultation bilaterally   Chest Wall:    No tenderness or deformity    Heart:    Regular rate and rhythm         Abdomen:     Soft, non-tender, bowel sounds +, liver is palpable 3 cm below right costal margin   Spleen is not palpable                Extremities:   Extremities no cyanosis or edema         Skin:   no rash or icterus  Lymph nodes:   Cervical, supraclavicular, and axillary nodes normal   Neurologic:   CNII-XII intact, normal strength, sensation and reflexes     Throughout             Breast exam:   Lumpectomy scar at 6 o'clock position in her left breast with no palpable abnormalities   Right breast exam is negative  ROS: Review of Systems   All other systems reviewed and are negative  Imaging: No results found        Labs:   Lab Results   Component Value Date    WBC 4 86 06/14/2021    HGB 13 7 06/14/2021    HCT 43 1 06/14/2021     (H) 06/14/2021     06/14/2021     Lab Results   Component Value Date    K 4 0 06/14/2021     06/14/2021    CO2 27 06/14/2021    BUN 14 06/14/2021    CREATININE 0 63 06/14/2021    GLUF 125 (H) 06/14/2021    CALCIUM 9 6 06/14/2021    AST 35 06/14/2021    ALT 91 (H) 06/14/2021    ALKPHOS 85 06/14/2021    EGFR 95 06/14/2021         Current Medications: Reviewed  Allergies: Reviewed  PMH/FH/SH:  Reviewed      Vital Sign:    Body surface area is 1 72 meters squared      Wt Readings from Last 3 Encounters:   06/23/22 74 8 kg (165 lb)   01/31/22 81 6 kg (179 lb 12 8 oz)   01/11/22 81 kg (178 lb 9 6 oz)        Temp Readings from Last 3 Encounters:   06/23/22 98 6 °F (37 °C) (Tympanic)   01/31/22 (!) 97 3 °F (36 3 °C)   01/11/22 97 8 °F (36 6 °C)        BP Readings from Last 3 Encounters:   06/23/22 134/78   01/31/22 122/84   01/11/22 130/86         Pulse Readings from Last 3 Encounters:   06/23/22 97   01/31/22 (!) 110   08/04/21 (!) 138     @LASTSAO2(3)@

## 2022-07-11 ENCOUNTER — OFFICE VISIT (OUTPATIENT)
Dept: FAMILY MEDICINE CLINIC | Facility: CLINIC | Age: 67
End: 2022-07-11
Payer: COMMERCIAL

## 2022-07-11 VITALS
HEART RATE: 104 BPM | WEIGHT: 177 LBS | OXYGEN SATURATION: 97 % | HEIGHT: 61 IN | BODY MASS INDEX: 33.42 KG/M2 | TEMPERATURE: 97.7 F | DIASTOLIC BLOOD PRESSURE: 90 MMHG | SYSTOLIC BLOOD PRESSURE: 140 MMHG

## 2022-07-11 DIAGNOSIS — E78.2 MIXED HYPERLIPIDEMIA: ICD-10-CM

## 2022-07-11 DIAGNOSIS — I10 ESSENTIAL HYPERTENSION: ICD-10-CM

## 2022-07-11 DIAGNOSIS — Z23 ENCOUNTER FOR IMMUNIZATION: ICD-10-CM

## 2022-07-11 DIAGNOSIS — I42.7 CARDIOMYOPATHY DUE TO DRUG AND EXTERNAL AGENT (HCC): ICD-10-CM

## 2022-07-11 DIAGNOSIS — C50.512 MALIGNANT NEOPLASM OF LOWER-OUTER QUADRANT OF LEFT BREAST OF FEMALE, ESTROGEN RECEPTOR POSITIVE (HCC): ICD-10-CM

## 2022-07-11 DIAGNOSIS — I10 PRIMARY HYPERTENSION: ICD-10-CM

## 2022-07-11 DIAGNOSIS — Z00.00 ANNUAL PHYSICAL EXAM: Primary | ICD-10-CM

## 2022-07-11 DIAGNOSIS — Z17.0 MALIGNANT NEOPLASM OF LOWER-OUTER QUADRANT OF LEFT BREAST OF FEMALE, ESTROGEN RECEPTOR POSITIVE (HCC): ICD-10-CM

## 2022-07-11 PROCEDURE — 90677 PCV20 VACCINE IM: CPT

## 2022-07-11 PROCEDURE — 90471 IMMUNIZATION ADMIN: CPT

## 2022-07-11 PROCEDURE — 99397 PER PM REEVAL EST PAT 65+ YR: CPT | Performed by: FAMILY MEDICINE

## 2022-07-11 PROCEDURE — 99214 OFFICE O/P EST MOD 30 MIN: CPT | Performed by: FAMILY MEDICINE

## 2022-07-11 RX ORDER — OLMESARTAN MEDOXOMIL AND HYDROCHLOROTHIAZIDE 40/12.5 40; 12.5 MG/1; MG/1
1 TABLET ORAL DAILY
Qty: 90 TABLET | Refills: 1 | Status: SHIPPED | OUTPATIENT
Start: 2022-07-11 | End: 2023-01-07

## 2022-07-11 RX ORDER — ROSUVASTATIN CALCIUM 5 MG/1
5 TABLET, COATED ORAL 3 TIMES WEEKLY
Qty: 36 TABLET | Refills: 1 | Status: SHIPPED | OUTPATIENT
Start: 2022-07-11 | End: 2023-01-07

## 2022-07-11 NOTE — PROGRESS NOTES
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 ([facility] [address] )       Other    Malignant neoplasm of lower-outer quadrant of left breast of female, [facility]    Hyperlipidemia    Relevant Medications    rosuvastatin (CRESTOR) 5 mg tablet      Other Visit Diagnoses     Annual physical exam    -  Primary    Relevant Orders    Cologuard    Lipid panel    CBC and Platelet    Comprehensive metabolic panel    TSH, 3rd generation    Vitamin D 25 hydroxy    Pneumococcal Conjugate Vaccine 20-valent (PCV20)    Essential hypertension        Relevant Medications    olmesartan-hydrochlorothiazide (BENICAR HCT) 40-12 5 MG per tablet    Encounter for immunization        Relevant Orders    Pneumococcal Conjugate Vaccine 20-valent (PCV20)          Immunizations and preventive care screenings were discussed with patient today  Appropriate education was printed on patient's after visit summary  Counseling:  Alcohol/drug use: discussed moderation in alcohol intake, the recommendations for healthy alcohol use, and avoidance of illicit drug use  Dental Health: discussed importance of regular tooth brushing, flossing, and dental visits  Injury prevention: discussed safety/seat belts, safety helmets, smoke detectors, carbon dioxide detectors, and smoking near bedding or upholstery  Sexual health: discussed sexually transmitted diseases, partner selection, use of condoms, avoidance of unintended pregnancy, and contraceptive alternatives  Exercise: the importance of regular exercise/physical activity was discussed  Recommend exercise 3-5 times per week for at least 30 minutes  Depression Screening and Follow-up Plan: Patient was screened for depression during today's encounter  They screened negative with a PHQ-2 score of 0  BMI Counseling: Body mass index is 33 44 kg/m²  The BMI is above normal  Nutrition recommendations include reducing portion sizes, decreasing overall calorie intake, 3-5 servings of fruits/vegetables daily and reducing fast food intake  Exercise recommendations include moderate aerobic physical activity for 150 minutes/week  Return in 6 months (on 1/11/2023)  Chief Complaint:     Chief Complaint   Patient presents with    Follow-up     6 month    Physical Exam     Annual      History of Present Illness:     Adult Annual Physical   Patient here for a comprehensive physical exam  The patient reports problems - see list     Diet and Physical Activity  Diet/Nutrition: well balanced diet  Exercise: moderate cardiovascular exercise  Depression Screening  PHQ-2/9 Depression Screening    Little interest or pleasure in doing things: 0 - not at all  Feeling down, depressed, or hopeless: 0 - not at all  PHQ-2 Score: 0  PHQ-2 Interpretation: Negative depression screen       General Health  Sleep: sleeps poorly  Hearing: normal - bilateral   Vision: no vision problems  Dental: regular dental visits         /GYN Health  Patient is: postmenopausal  Last menstrual period: na  Contraceptive method: na      Review of Systems:     Review of Systems   Past Medical History:     Past Medical History:   Diagnosis Date    Breast cancer ([facility] [address] ) 10/2019    grade 3 ER Positive HI negative Her 2 3+ disease left    History of chemotherapy 03/2020    History of radiation therapy 04/2020    Hyperlipidemia     Hypertension     Vertigo     no medication      Past Surgical History:     Past Surgical History:   Procedure Laterality Date    BREAST BIOPSY Left 09/17/2019    Left U/S BX invasive mammary carcinoma    BREAST BIOPSY Right 2021    benign    BREAST LUMPECTOMY Left 10/29/2019    Procedure: BREAST NEEDLE LOCALIZED LUMPECTOMY (NEEDLE LOC AT 0800); Surgeon: Mary Zamarripa MD;  [facility]: [facility];  Service: Surgical Oncology    FRACTURE SURGERY Left     surgery repair- elbow     IR PORT PLACEMENT  12/3/2019    IR PORT REMOVAL  2020    LYMPH NODE BIOPSY Left 10/29/2019    Procedure: SENTINEL LYMPH NODE BIOPSY; LYMPHATIC MAPPING WITH BLUE DYE AND RADIOACTIVE DYE (INJECT AT 0900 BY DR PALACIOS IN THE OR); Surgeon: Mary Zamarripa MD;  [facility];  Service: Surgical Oncology    MAMMO NEEDLE LOCALIZATION LEFT (ALL INC) Left 10/29/2019    MAMMO STEREOTACTIC BREAST BIOPSY RIGHT (ALL INC) Right 2019    US GUIDANCE BREAST BIOPSY LEFT EACH ADDITIONAL Left 2019    US GUIDED BREAST BIOPSY LEFT COMPLETE Left 2019    WISDOM TOOTH EXTRACTION Bilateral       Social History:     Social History     Socioeconomic History    Marital status: /Civil Union     Spouse name: None    Number of children: None    Years of education: None    Highest education level: None   Occupational History    None   Tobacco Use    Smoking status: Former Smoker     Packs/day: 0 75     Years: 38 00     Pack years: 28 50     Quit date:      Years since quittin 5    Smokeless tobacco: Never Used   Vaping Use    Vaping Use: Never used   Substance and Sexual Activity    Alcohol use:  Yes    Drug use: Never    Sexual activity: Yes     Partners: Female     Birth control/protection: None, Post-menopausal   Other Topics Concern    None   Social History Narrative    None     Social Determinants of Health     Financial Resource Strain: Not on file   Food Insecurity: Not on file   Transportation Needs: Not on file   Physical Activity: Not on file   Stress: Not on file   Social Connections: Not on file   Intimate Partner Violence: Not on file   Housing Stability: Not on file      Family History: Family History   Problem Relation Age of Onset    No Known Problems Mother     Prostate cancer Father 79    Arthritis Father     No Known Problems Sister     No Known Problems Paternal Aunt     Nail disease Paternal Aunt     No Known Problems Paternal Aunt     No Known Problems Maternal Grandmother     No Known Problems Maternal Grandfather     No Known Problems Paternal Grandmother     No Known Problems Paternal Grandfather       Current Medications:     Current Outpatient Medications   Medication Sig Dispense Refill    anastrozole (ARIMIDEX) 1 mg tablet Take 1 tablet (1 mg total) by mouth daily 90 tablet 1    Cholecalciferol (VITAMIN D3) 1000 units CAPS Take 1,000 capsules by mouth daily in the early morning       Multiple Vitamins-Minerals (CENTRUM SILVER 50+WOMEN PO) Take 1 tablet by mouth daily in the early morning       olmesartan-hydrochlorothiazide (BENICAR HCT) 40-12 5 MG per tablet Take 1 tablet by mouth daily 90 tablet 1    rosuvastatin (CRESTOR) 5 mg tablet Take 1 tablet (5 mg total) by mouth 3 (three) times a week 36 tablet 1     No current facility-administered medications for this visit  Allergies:      Allergies   Allergen Reactions    Adhesive [Medical Tape]       Physical Exam:     /90 (BP Location: Left arm, Patient Position: Sitting, Cuff Size: Standard)   Pulse 104   Temp 97 7 °F (36 5 °C) (Tympanic)   Ht 5' 1" (1 549 m)   Wt 80 3 kg (177 lb)   LMP  (LMP Unknown)   SpO2 97%   BMI 33 44 kg/m²     Physical Exam     Nohemy Perrin, DO  [address]

## 2022-07-11 NOTE — PATIENT INSTRUCTIONS
Wellness Visit for Adults   AMBULATORY CARE:   A wellness visit  is when you see your healthcare provider to get screened for health problems  Your healthcare provider will also give you advice on how to stay healthy  Write down your questions so you remember to ask them  Ask your healthcare provider how often you should have a wellness visit  What happens at a wellness visit:  Your healthcare provider will ask about your health, and your family history of health problems  This includes high blood pressure, heart disease, and cancer  He or she will ask if you have symptoms that concern you, if you smoke, and about your mood  You may also be asked about your intake of medicines, supplements, food, and alcohol  Any of the following may be done:  · Your weight  will be checked  Your height may also be checked so your body mass index (BMI) can be calculated  Your BMI shows if you are at a healthy weight  · Your blood pressure  and heart rate will be checked  Your temperature may also be checked  · Blood and urine tests  may be done  Blood tests may be done to check your cholesterol levels  Abnormal cholesterol levels increase your risk for heart disease and stroke  You may also need a blood or urine test to check for diabetes if you are at increased risk  Urine tests may be done to look for signs of an infection or kidney disease  · A physical exam  includes checking your heartbeat and lungs with a stethoscope  Your healthcare provider may also check your skin to look for sun damage  · Screening tests  may be recommended  A screening test is done to check for diseases that may not cause symptoms  The screening tests you may need depend on your age, gender, family history, and lifestyle habits  For example, colorectal screening may be recommended if you are 48years old or older  Screening tests you need if you are a woman:   · A Pap smear  is used to screen for cervical cancer   Pap smears are usually done every 3 to 5 years depending on your age  You may need them more often if you have had abnormal Pap smear test results in the past  Ask your healthcare provider how often you should have a Pap smear  · A mammogram  is an x-ray of your breasts to screen for breast cancer  Experts recommend mammograms every 2 years starting at age 48 years  You may need a mammogram at age 52 years or younger if you have an increased risk for breast cancer  Talk to your healthcare provider about when you should start having mammograms and how often you need them  Vaccines you may need:   · Get an influenza vaccine  every year  The influenza vaccine protects you from the flu  Several types of viruses cause the flu  The viruses change over time, so new vaccines are made each year  · Get a tetanus-diphtheria (Td) booster vaccine  every 10 years  This vaccine protects you against tetanus and diphtheria  Tetanus is a severe infection that may cause painful muscle spasms and lockjaw  Diphtheria is a severe bacterial infection that causes a thick covering in the back of your mouth and throat  · Get a human papillomavirus (HPV) vaccine  if you are female and aged 23 to 32 or male 23 to 24 and never received it  This vaccine protects you from HPV infection  HPV is the most common infection spread by sexual contact  HPV may also cause vaginal, penile, and anal cancers  · Get a pneumococcal vaccine  if you are aged 72 years or older  The pneumococcal vaccine is an injection given to protect you from pneumococcal disease  Pneumococcal disease is an infection caused by pneumococcal bacteria  The infection may cause pneumonia, meningitis, or an ear infection  · Get a shingles vaccine  if you are 60 or older, even if you have had shingles before  The shingles vaccine is an injection to protect you from the varicella-zoster virus  This is the same virus that causes chickenpox   Shingles is a painful rash that develops in people who had chickenpox or have been exposed to the virus  How to eat healthy:  My Plate is a model for planning healthy meals  It shows the types and amounts of foods that should go on your plate  Fruits and vegetables make up about half of your plate, and grains and protein make up the other half  A serving of dairy is included on the side of your plate  The amount of calories and serving sizes you need depends on your age, gender, weight, and height  Examples of healthy foods are listed below:  · Eat a variety of vegetables  such as dark green, red, and orange vegetables  You can also include canned vegetables low in sodium (salt) and frozen vegetables without added butter or sauces  · Eat a variety of fresh fruits , canned fruit in 100% juice, frozen fruit, and dried fruit  · Include whole grains  At least half of the grains you eat should be whole grains  Examples include whole-wheat bread, wheat pasta, brown rice, and whole-grain cereals such as oatmeal     · Eat a variety of protein foods such as seafood (fish and shellfish), lean meat, and poultry without skin (turkey and chicken)  Examples of lean meats include pork leg, shoulder, or tenderloin, and beef round, sirloin, tenderloin, and extra lean ground beef  Other protein foods include eggs and egg substitutes, beans, peas, soy products, nuts, and seeds  · Choose low-fat dairy products such as skim or 1% milk or low-fat yogurt, cheese, and cottage cheese  · Limit unhealthy fats  such as butter, hard margarine, and shortening  Exercise:  Exercise at least 30 minutes per day on most days of the week  Some examples of exercise include walking, biking, dancing, and swimming  You can also fit in more physical activity by taking the stairs instead of the elevator or parking farther away from stores  Include muscle strengthening activities 2 days each week  Regular exercise provides many health benefits   It helps you manage your weight, and decreases your risk for type 2 diabetes, heart disease, stroke, and high blood pressure  Exercise can also help improve your mood  Ask your healthcare provider about the best exercise plan for you  General health and safety guidelines:   · Do not smoke  Nicotine and other chemicals in cigarettes and cigars can cause lung damage  Ask your healthcare provider for information if you currently smoke and need help to quit  E-cigarettes or smokeless tobacco still contain nicotine  Talk to your healthcare provider before you use these products  · Limit alcohol  A drink of alcohol is 12 ounces of beer, 5 ounces of wine, or 1½ ounces of liquor  · Lose weight, if needed  Being overweight increases your risk of certain health conditions  These include heart disease, high blood pressure, type 2 diabetes, and certain types of cancer  · Protect your skin  Do not sunbathe or use tanning beds  Use sunscreen with a SPF 15 or higher  Apply sunscreen at least 15 minutes before you go outside  Reapply sunscreen every 2 hours  Wear protective clothing, hats, and sunglasses when you are outside  · Drive safely  Always wear your seatbelt  Make sure everyone in your car wears a seatbelt  A seatbelt can save your life if you are in an accident  Do not use your cell phone when you are driving  This could distract you and cause an accident  Pull over if you need to make a call or send a text message  · Practice safe sex  Use latex condoms if are sexually active and have more than one partner  Your healthcare provider may recommend screening tests for sexually transmitted infections (STIs)  · Wear helmets, lifejackets, and protective gear  Always wear a helmet when you ride a bike or motorcycle, go skiing, or play sports that could cause a head injury  Wear protective equipment when you play sports  Wear a lifejacket when you are on a boat or doing water sports      © Copyright [address] 2022 Information is for End User's use only and may not be sold, redistributed or otherwise used for commercial purposes  All illustrations and images included in CareNotes® are the copyrighted property of [employer]  or [employer]ckinn[employer]  The above information is an  only  It is not intended as medical advice for individual conditions or treatments  Talk to your doctor, nurse or pharmacist before following any medical regimen to see if it is safe and effective for you  Weight Management   AMBULATORY CARE:   Why it is important to manage your weight:  Being overweight increases your risk of health conditions such as heart disease, high blood pressure, type 2 diabetes, and certain types of cancer  It can also increase your risk for osteoarthritis, sleep apnea, and other respiratory problems  Aim for a slow, steady weight loss  Even a small amount of weight loss can lower your risk of health problems  Risks of being overweight:  Extra weight can cause many health problems, including the following:  · Diabetes (high blood sugar level)    · High blood pressure or high cholesterol    · Heart disease    · Stroke    · Gallbladder or liver disease    · Cancer of the colon, breast, prostate, liver, or kidney    · Sleep apnea    · Arthritis or gout    Screening  is done to check for health conditions before you have signs or symptoms  If you are 28to 79years old, your blood sugar level may be checked every 3 years for signs of prediabetes or diabetes  Your healthcare provider will check your blood pressure at each visit  High blood pressure can lead to a stroke or other problems  Your provider may check for signs of heart disease, cancer, or other health problems  How to lose weight safely:  A safe and healthy way to lose weight is to eat fewer calories and get regular exercise  · You can lose up about 1 pound a week by decreasing the number of calories you eat by 500 calories each day   You can decrease calories by eating smaller portion sizes or by cutting out high-calorie foods  Read labels to find out how many calories are in the foods you eat  · You can also burn calories with exercise such as walking, swimming, or biking  You will be more likely to keep weight off if you make these changes part of your lifestyle  Exercise at least 30 minutes per day on most days of the week  You can also fit in more physical activity by taking the stairs instead of the elevator or parking farther away from stores  Ask your healthcare provider about the best exercise plan for you  Healthy meal plan for weight management:  A healthy meal plan includes a variety of foods, contains fewer calories, and helps you stay healthy  A healthy meal plan includes the following:     · Eat whole-grain foods more often  A healthy meal plan should contain fiber  Fiber is the part of grains, fruits, and vegetables that is not broken down by your body  Whole-grain foods are healthy and provide extra fiber in your diet  Some examples of whole-grain foods are whole-wheat breads and pastas, oatmeal, brown rice, and bulgur  · Eat a variety of vegetables every day  Include dark, leafy greens such as spinach, kale, mehran greens, and mustard greens  Eat yellow and orange vegetables such as carrots, sweet potatoes, and winter squash  · Eat a variety of fruits every day  Choose fresh or canned fruit (canned in its own juice or light syrup) instead of juice  Fruit juice has very little or no fiber  · Eat low-fat dairy foods  Drink fat-free (skim) milk or 1% milk  Eat fat-free yogurt and low-fat cottage cheese  Try low-fat cheeses such as mozzarella and other reduced-fat cheeses  · Choose meat and other protein foods that are low in fat  Choose beans or other legumes such as split peas or lentils  Choose fish, skinless poultry (chicken or turkey), or lean cuts of red meat (beef or pork)   Before you cook meat or poultry, cut off any visible fat  · Use less fat and oil  Try baking foods instead of frying them  Add less fat, such as margarine, sour cream, regular salad dressing and mayonnaise to foods  Eat fewer high-fat foods  Some examples of high-fat foods include french fries, doughnuts, ice cream, and cakes  · Eat fewer sweets  Limit foods and drinks that are high in sugar  This includes candy, cookies, regular soda, and sweetened drinks  Ways to decrease calories:   · Eat smaller portions  ? Use a small plate with smaller servings  ? Do not eat second helpings  ? When you eat at a restaurant, ask for a box and place half of your meal in the box before you eat  ? Share an entrée with someone else  · Replace high-calorie snacks with healthy, low-calorie snacks  ? Choose fresh fruit, vegetables, fat-free rice cakes, or air-popped popcorn instead of potato chips, nuts, or chocolate  ? Choose water or calorie-free drinks instead of soda or sweetened drinks  · Do not shop for groceries when you are hungry  You may be more likely to make unhealthy food choices  Take a grocery list of healthy foods and shop after you have eaten  · Eat regular meals  Do not skip meals  Skipping meals can lead to overeating later in the day  This can make it harder for you to lose weight  Eat a healthy snack in place of a meal if you do not have time to eat a regular meal  Talk with a dietitian to help you create a meal plan and schedule that is right for you  Other things to consider as you try to lose weight:   · Be aware of situations that may give you the urge to overeat, such as eating while watching television  Find ways to avoid these situations  For example, read a book, go for a walk, or do crafts  · Meet with a weight loss support group or friends who are also trying to lose weight  This may help you stay motivated to continue working on your weight loss goals      © Copyright [employer] 2022 Information is for End User's use only and may not be sold, redistributed or otherwise used for commercial purposes  All illustrations and images included in CareNotes® are the copyrighted property of [employer]  or Sarah Mckinnon  The above information is an  only  It is not intended as medical advice for individual conditions or treatments  Talk to your doctor, nurse or pharmacist before following any medical regimen to see if it is safe and effective for you  Cholesterol and Your Health   AMBULATORY CARE:   Cholesterol  is a waxy, fat-like substance  Your body uses cholesterol to make hormones and new cells, and to protect nerves  Cholesterol is made by your body  It also comes from certain foods you eat, such as meat and dairy products  Your healthcare provider can help you set goals for your cholesterol levels  He or she can help you create a plan to meet your goals  Cholesterol level goals: Your cholesterol level goals depend on your risk for heart disease, your age, and your other health conditions  The following are general guidelines:  · Total cholesterol  includes low-density lipoprotein (LDL), high-density lipoprotein (HDL), and triglyceride levels  The total cholesterol level should be lower than 200 mg/dL and is best at about 150 mg/dL  · LDL cholesterol  is called bad cholesterol  because it forms plaque in your arteries  As plaque builds up, your arteries become narrow, and less blood flows through  When plaque decreases blood flow to your heart, you may have chest pain  If plaque completely blocks an artery that brings blood to your heart, you may have a heart attack  Plaque can break off and form blood clots  Blood clots may block arteries in your brain and cause a stroke  The level should be less than 130 mg/dL and is best at about 100 mg/dL  · HDL cholesterol  is called good cholesterol  because it helps remove LDL cholesterol from your arteries   It does this by attaching to LDL cholesterol and carrying it to your liver  Your liver breaks down LDL cholesterol so your body can get rid of it  High levels of HDL cholesterol can help prevent a heart attack and stroke  Low levels of HDL cholesterol can increase your risk for heart disease, heart attack, and stroke  The level should be 60 mg/dL or higher  · Triglycerides  are a type of fat that store energy from foods you eat  High levels of triglycerides also cause plaque buildup  This can increase your risk for a heart attack or stroke  If your triglyceride level is high, your LDL cholesterol level may also be high  The level should be less than 150 mg/dL  Any of the following can increase your risk for high cholesterol:   · Smoking cigarettes    · Being overweight or obese, or not getting enough exercise    · Drinking large amounts of alcohol    · A medical condition such as hypertension (high blood pressure) or diabetes    · Certain genes passed from your parents to you    · Age older than 65 years    What you need to know about having your cholesterol levels checked: Adults 21to 39years of age should have their cholesterol levels checked every 4 to 6 years  Adults 45 years or older should have their cholesterol checked every 1 to 2 years  You may need your cholesterol checked more often, or at a younger age, if you have risk factors for heart disease  You may also need to have your cholesterol checked more often if you have other health conditions, such as diabetes  Blood tests are used to check cholesterol levels  Blood tests measure your levels of triglycerides, LDL cholesterol, and HDL cholesterol  How healthy fats affect your cholesterol levels:  Healthy fats, also called unsaturated fats, help lower LDL cholesterol and triglyceride levels  Healthy fats include the following:  · Monounsaturated fats  are found in foods such as olive oil, canola oil, avocado, nuts, and olives      · Polyunsaturated fats,  such as omega 3 fats, are found in fish, such as salmon, trout, and tuna  They can also be found in plant foods such as flaxseed, walnuts, and soybeans  How unhealthy fats affect your cholesterol levels:  Unhealthy fats increase LDL cholesterol and triglyceride levels  They are found in foods high in cholesterol, saturated fat, and trans fat:  · Cholesterol  is found in eggs, dairy, and meat  · Saturated fat  is found in butter, cheese, ice cream, whole milk, and coconut oil  Saturated fat is also found in meat, such as sausage, hot dogs, and bologna  · Trans fat  is found in liquid oils and is used in fried and baked foods  Foods that contain trans fats include chips, crackers, muffins, sweet rolls, microwave popcorn, and cookies  Treatment  for high cholesterol will also decrease your risk of heart disease, heart attack, and stroke  Treatment may include any of the following:  · Lifestyle changes  may include food, exercise, weight loss, and quitting smoking  You may also need to decrease the amount of alcohol you drink  Your healthcare provider will want you to start with lifestyle changes  Other treatment may be added if lifestyle changes are not enough  Your healthcare provider may recommend you work with a team to manage hyperlipidemia  The team may include medical experts such as a dietitian, an exercise or physical therapist, and a behavior therapist  Your family members may be included in helping you create lifestyle changes  · Medicines  may be given to lower your LDL cholesterol, triglyceride levels, or total cholesterol level  You may need medicines to lower your cholesterol if any of the following is true:    ? You have a history of stroke, TIA, unstable angina, or a heart attack  ? Your LDL cholesterol level is 190 mg/dL or higher  ? You are age 36 to 76 years, have diabetes or heart disease risk factors, and your LDL cholesterol is 70 mg/dL or higher      · Supplements  include fish oil, red yeast rice, and garlic  Fish oil may help lower your triglyceride and LDL cholesterol levels  It may also increase your HDL cholesterol level  Red yeast rice may help decrease your total cholesterol level and LDL cholesterol level  Garlic may help lower your total cholesterol level  Do not take any supplements without talking to your healthcare provider  Food changes you can make to lower your cholesterol levels:  A dietitian can help you create a healthy eating plan  He or she can show you how to read food labels and choose foods low in saturated fat, trans fats, and cholesterol  · Decrease the total amount of fat you eat  Choose lean meats, fat-free or 1% fat milk, and low-fat dairy products, such as yogurt and cheese  Try to limit or avoid red meats  Limit or do not eat fried foods or baked goods, such as cookies  · Replace unhealthy fats with healthy fats  Cook foods in olive oil or canola oil  Choose soft margarines that are low in saturated fat and trans fat  Seeds, nuts, and avocados are other examples of healthy fats  · Eat foods with omega-3 fats  Examples include salmon, tuna, mackerel, walnuts, and flaxseed  Eat fish 2 times per week  Pregnant women should not eat fish that have high levels of mercury, such as shark, swordfish, and barbara mackerel  · Increase the amount of high-fiber foods you eat  High-fiber foods can help lower your LDL cholesterol  Aim to get between 20 and 30 grams of fiber each day  Fruits and vegetables are high in fiber  Eat at least 5 servings each day  Other high-fiber foods are whole-grain or whole-wheat breads, pastas, or cereals, and brown rice  Eat 3 ounces of whole-grain foods each day  Increase fiber slowly  You may have abdominal discomfort, bloating, and gas if you add fiber to your diet too quickly  · Eat healthy protein foods  Examples include low-fat dairy products, skinless chicken and turkey, fish, and nuts      · Limit foods and drinks that are high in sugar  Your dietitian or healthcare provider can help you create daily limits for high-sugar foods and drinks  The limit may be lower if you have diabetes or another health condition  Limits can also help you lose weight if needed  Lifestyle changes you can make to lower your cholesterol levels:   · Maintain a healthy weight  Ask your healthcare provider what a healthy weight is for you  Ask him or her to help you create a weight loss plan if needed  Weight loss can decrease your total cholesterol and triglyceride levels  Weight loss may also help keep your blood pressure at a healthy level  · Be physically active throughout the day  Physical activity, such as exercise, can help lower your total cholesterol level and maintain a healthy weight  Physical activity can also help increase your HDL cholesterol level  Work with your healthcare provider to create an program that is right for you  Get at least 30 to 40 minutes of moderate physical activity most days of the week  Examples of exercise include brisk walking, swimming, or biking  Also include strength training at least 2 times each week  Your healthcare providers can help you create a physical activity plan  · Do not smoke  Nicotine and other chemicals in cigarettes and cigars can raise your cholesterol levels  Ask your healthcare provider for information if you currently smoke and need help to quit  E-cigarettes or smokeless tobacco still contain nicotine  Talk to your healthcare provider before you use these products  · Limit or do not drink alcohol  Alcohol can increase your triglyceride levels  Ask your healthcare provider before you drink alcohol  Ask how much is okay for you to drink in 24 hours or 1 week  Follow up with your doctor as directed:  Write down your questions so you remember to ask them during your visits    © Copyright [employer] 2022 Information is for End User's use only and may not be sold, redistributed or otherwise used for commercial purposes  All illustrations and images included in CareNotes® are the copyrighted property of [employer]  or Sarah Mckinnon  The above information is an  only  It is not intended as medical advice for individual conditions or treatments  Talk to your doctor, nurse or pharmacist before following any medical regimen to see if it is safe and effective for you

## 2022-07-22 LAB — COLOGUARD RESULT REPORTABLE: NEGATIVE

## 2022-08-05 ENCOUNTER — OFFICE VISIT (OUTPATIENT)
Dept: SURGICAL ONCOLOGY | Facility: CLINIC | Age: 67
End: 2022-08-05
Payer: COMMERCIAL

## 2022-08-05 ENCOUNTER — APPOINTMENT (OUTPATIENT)
Dept: LAB | Facility: CLINIC | Age: 67
End: 2022-08-05
Payer: COMMERCIAL

## 2022-08-05 VITALS
WEIGHT: 173 LBS | OXYGEN SATURATION: 98 % | SYSTOLIC BLOOD PRESSURE: 128 MMHG | RESPIRATION RATE: 16 BRPM | BODY MASS INDEX: 32.66 KG/M2 | TEMPERATURE: 98.3 F | HEART RATE: 80 BPM | HEIGHT: 61 IN | DIASTOLIC BLOOD PRESSURE: 80 MMHG

## 2022-08-05 DIAGNOSIS — Z00.8 ENCOUNTER FOR OTHER GENERAL EXAMINATION: Primary | ICD-10-CM

## 2022-08-05 DIAGNOSIS — Z17.0 MALIGNANT NEOPLASM OF LOWER-OUTER QUADRANT OF LEFT BREAST OF FEMALE, ESTROGEN RECEPTOR POSITIVE (HCC): Primary | ICD-10-CM

## 2022-08-05 DIAGNOSIS — C50.512 MALIGNANT NEOPLASM OF LOWER-OUTER QUADRANT OF LEFT BREAST OF FEMALE, ESTROGEN RECEPTOR POSITIVE (HCC): Primary | ICD-10-CM

## 2022-08-05 DIAGNOSIS — Z00.00 ANNUAL PHYSICAL EXAM: ICD-10-CM

## 2022-08-05 DIAGNOSIS — Z79.811 USE OF ANASTROZOLE: ICD-10-CM

## 2022-08-05 DIAGNOSIS — Z00.8 ENCOUNTER FOR OTHER GENERAL EXAMINATION: ICD-10-CM

## 2022-08-05 LAB
25(OH)D3 SERPL-MCNC: 37.4 NG/ML (ref 30–100)
ALBUMIN SERPL BCP-MCNC: 3.6 G/DL (ref 3.5–5)
ALP SERPL-CCNC: 76 U/L (ref 46–116)
ALT SERPL W P-5'-P-CCNC: 167 U/L (ref 12–78)
ANION GAP SERPL CALCULATED.3IONS-SCNC: 7 MMOL/L (ref 4–13)
AST SERPL W P-5'-P-CCNC: 83 U/L (ref 5–45)
BILIRUB SERPL-MCNC: 0.63 MG/DL (ref 0.2–1)
BUN SERPL-MCNC: 10 MG/DL (ref 5–25)
CALCIUM SERPL-MCNC: 9.7 MG/DL (ref 8.3–10.1)
CHLORIDE SERPL-SCNC: 99 MMOL/L (ref 96–108)
CHOLEST SERPL-MCNC: 198 MG/DL
CO2 SERPL-SCNC: 28 MMOL/L (ref 21–32)
CREAT SERPL-MCNC: 0.69 MG/DL (ref 0.6–1.3)
ERYTHROCYTE [DISTWIDTH] IN BLOOD BY AUTOMATED COUNT: 13.3 % (ref 11.6–15.1)
EST. AVERAGE GLUCOSE BLD GHB EST-MCNC: 117 MG/DL
GFR SERPL CREATININE-BSD FRML MDRD: 91 ML/MIN/1.73SQ M
GLUCOSE P FAST SERPL-MCNC: 113 MG/DL (ref 65–99)
HBA1C MFR BLD: 5.7 %
HCT VFR BLD AUTO: 42.4 % (ref 34.8–46.1)
HDLC SERPL-MCNC: 69 MG/DL
HGB BLD-MCNC: 13.9 G/DL (ref 11.5–15.4)
LDLC SERPL CALC-MCNC: 114 MG/DL (ref 0–100)
MCH RBC QN AUTO: 32.3 PG (ref 26.8–34.3)
MCHC RBC AUTO-ENTMCNC: 32.8 G/DL (ref 31.4–37.4)
MCV RBC AUTO: 99 FL (ref 82–98)
NONHDLC SERPL-MCNC: 129 MG/DL
PLATELET # BLD AUTO: 217 THOUSANDS/UL (ref 149–390)
PMV BLD AUTO: 9.7 FL (ref 8.9–12.7)
POTASSIUM SERPL-SCNC: 3.6 MMOL/L (ref 3.5–5.3)
PROT SERPL-MCNC: 7.8 G/DL (ref 6.4–8.4)
RBC # BLD AUTO: 4.3 MILLION/UL (ref 3.81–5.12)
SODIUM SERPL-SCNC: 134 MMOL/L (ref 135–147)
TRIGL SERPL-MCNC: 76 MG/DL
TSH SERPL DL<=0.05 MIU/L-ACNC: 1 UIU/ML (ref 0.45–4.5)
WBC # BLD AUTO: 5.53 THOUSAND/UL (ref 4.31–10.16)

## 2022-08-05 PROCEDURE — 84443 ASSAY THYROID STIM HORMONE: CPT

## 2022-08-05 PROCEDURE — 83036 HEMOGLOBIN GLYCOSYLATED A1C: CPT

## 2022-08-05 PROCEDURE — 82306 VITAMIN D 25 HYDROXY: CPT

## 2022-08-05 PROCEDURE — 80061 LIPID PANEL: CPT

## 2022-08-05 PROCEDURE — 85027 COMPLETE CBC AUTOMATED: CPT

## 2022-08-05 PROCEDURE — 80053 COMPREHEN METABOLIC PANEL: CPT

## 2022-08-05 PROCEDURE — 36415 COLL VENOUS BLD VENIPUNCTURE: CPT

## 2022-08-05 PROCEDURE — 99214 OFFICE O/P EST MOD 30 MIN: CPT

## 2022-08-05 NOTE — PROGRESS NOTES
Surgical Oncology Follow Up       [facility] [place] [facility]  [facility] [address]    Xiomara Johnson  1955  [id number]  [facility] ONCOLOGY [place]  [address] [place] [address]    Chief Complaint   Patient presents with    Follow-up       Assessment/Plan:  1  Malignant neoplasm of lower-outer quadrant of left breast of female, estrogen receptor positive ([address] )  - 6 month follow up  - Mammo diagnostic bilateral w 3d & cad; Future    2  Use of anastrozole  - continue use per medical oncology    Discussion/Summary:  Patient is a 71-year-old female presenting for a six-month follow-up for left breast cancer diagnosed in September 2019  Pathology revealed invasive mammary carcinoma ER 70%, MN less than 1%, HER2 positive  She underwent genetic testing which was negative  She had a left breast lumpectomy sentinel node biopsy with Dr Levon Galan  She completed adjuvant chemotherapy, Herceptin monotherapy and whole breast radiation therapy  She is currently on anastrozole  She will be due for bilateral diagnostic mammogram in November  There were no concerns on her clinical breast exam  She will see Cherelle andrea in 6 months for a survivorship visit  She was instructed to call with any questions or concerns prior to this time  All questions were answered today  History of Present Illness:     Oncology History   Malignant neoplasm of lower-outer quadrant of left breast of female, estrogen receptor positive ([facility] [address] )   9/17/2019 Biopsy    Left breast ultrasound-guided biospy  A  5 o'clock, 7 cm from nipple  Invasive mammary carcinoma of no special type (ductal, not otherwise specified)  Grade 1  ER 70  MN < 1  HER2 3+    B  1 o'clock, 5 cm from nipple  Benign, fibrocystic changes without atypia  Involving intraductal micropapilloma  No evidence of malignancy    Right breast stereotactic biopsy  C   Right breast without calcifications  Benign, fibrocystic changes without atypia  No evidence of malignancy    Concordant  Unifocal; measures 2 cm on US  Left axilla negative  Right breast clear  Carcinoma with butterfly shaped clip      10/10/2019 Genetic Testing    The following genes were evaluated: MOI, BRCA1, BRCA2, CDH1, CHEK2, PALB2, PTEN, STK11, TP53  Negative result   No pathogenic sequence variants or deletions/dupllications identified  [employer]     10/29/2019 Surgery    Left breast needle localized lumpectomy with sentinel lymph node biopsy  Invasive mammary carcinoma of no special type (ductal, not otherwise specified)  Grade 3  2 2 cm  Margins negative  0/1 Lymph Nodes  Anatomic/Prognostic Stage IIA     12/6/2019 - 12/17/2020 Chemotherapy    pegfilgrastim (NEULASTA) subcutaneous injection 6 mg, 6 mg, Subcutaneous, Once, 1 of 1 cycle  Administration: 6 mg (12/30/2019)  pegfilgrastim (NEULASTA ONPRO) subcutaneous injection kit 6 mg, 6 mg, Subcutaneous, Once, 6 of 6 cycles  Administration: 6 mg (12/6/2019), 6 mg (12/27/2019), 6 mg (1/17/2020), 6 mg (2/7/2020), 6 mg (2/28/2020), 6 mg (3/20/2020)  fosaprepitant (EMEND) 150 mg in sodium chloride 0 9 % 255 mL IVPB, 150 mg, Intravenous, Once, 6 of 6 cycles  Administration: 150 mg (12/6/2019), 150 mg (12/27/2019), 150 mg (1/17/2020), 150 mg (2/7/2020), 150 mg (2/28/2020), 150 mg (3/20/2020)  CARBOplatin (PARAPLATIN) 616 8 mg in sodium chloride 0 9 % 250 mL IVPB, 616 8 mg, Intravenous, Once, 6 of 6 cycles  Administration: 616 8 mg (12/6/2019), 616 8 mg (12/27/2019), 616 8 mg (1/17/2020), 616 8 mg (2/7/2020), 616 8 mg (2/28/2020), 616 8 mg (3/20/2020)  DOCEtaxel (TAXOTERE) 106 2 mg in sodium chloride 0 9 % 250 mL chemo infusion, 60 mg/m2 = 106 2 mg (80 % of original dose 75 mg/m2), Intravenous, Once, 6 of 6 cycles  Dose modification: 60 mg/m2 (original dose 75 mg/m2, Cycle 1, Reason: Dose Not Tolerated), 75 mg/m2 (original dose 75 mg/m2, Cycle 2, Reason: Other (Must fill in a comment), Comment: LFT normalized  )  Administration: 106 2 mg (12/6/2019), 132 8 mg (12/27/2019), 132 8 mg (1/17/2020), 132 8 mg (2/7/2020), 132 8 mg (2/28/2020), 132 8 mg (3/20/2020)  trastuzumab (HERCEPTIN) 600 mg in sodium chloride 0 9 % 250 mL chemo infusion, 624 mg, Intravenous, Once, 18 of 18 cycles  Administration: 600 mg (12/6/2019), 450 mg (12/27/2019), 450 mg (4/10/2020), 450 mg (1/17/2020), 450 mg (2/7/2020), 450 mg (2/28/2020), 450 mg (3/20/2020), 450 mg (5/1/2020), 450 mg (5/22/2020), 450 mg (6/12/2020), 450 mg (7/2/2020), 450 mg (7/24/2020), 450 mg (8/14/2020), 450 mg (9/4/2020), 450 mg (9/28/2020), 450 mg (10/16/2020), 450 mg (11/6/2020), 450 mg (11/27/2020)     5/4/2020 - 6/2/2020 Radiation    Plan ID Energy Fractions Dose per Fraction (cGy) Dose Correction (cGy) Total Dose Delivered (cGy) Elapsed Days   BH L BOOST 6X 5 / 5 200 0 1,000 6   BH L BREAST 6X 16 / 16 266 0 4,256 22               -Interval History: Patient is a 18-year-old female presenting for a six-month follow-up for left breast cancer diagnosed in September 2019  She is currently on anastrozole  Patient denies changes on her breast exam  She denies persistent headaches, bone pain, back pain, shortness of breath, or abdominal pain  Review of Systems:  Review of Systems   Constitutional: Negative for activity change, appetite change, fatigue and unexpected weight change  Respiratory: Negative for cough and shortness of breath  Cardiovascular: Negative for chest pain  Gastrointestinal: Negative for abdominal pain, diarrhea, nausea and vomiting  Endocrine: Negative for heat intolerance  Musculoskeletal: Negative for arthralgias, back pain and myalgias  Skin: Negative for rash  Neurological: Negative for weakness and headaches  Hematological: Negative for adenopathy         Patient Active Problem List   Diagnosis    Malignant neoplasm of lower-outer quadrant of left breast of female, estrogen receptor positive ([facility] )    Chemotherapy induced neutropenia ([facility] )    Port-A-Cath in place    Hypertension    Hyperlipidemia    Use of anastrozole    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 [facility])     Past Medical History:   Diagnosis Date    Breast cancer ([facility] ) 10/2019    grade 3 ER Positive [place] negative Her 2 3+ disease left    History of chemotherapy 03/2020    History of radiation therapy 04/2020    Hyperlipidemia     Hypertension     Vertigo     no medication     Past Surgical History:   Procedure Laterality Date    BREAST BIOPSY Left 09/17/2019    Left U/S BX invasive mammary carcinoma    BREAST BIOPSY Right 09/17/2021    benign    BREAST LUMPECTOMY Left 10/29/2019    Procedure: BREAST NEEDLE LOCALIZED LUMPECTOMY (NEEDLE LOC AT 0800); Surgeon: Ileana Barron MD;  [facility];  Service: Surgical Oncology    FRACTURE SURGERY Left     surgery repair- elbow     IR PORT PLACEMENT  12/3/2019    IR PORT REMOVAL  12/29/2020    LYMPH NODE BIOPSY Left 10/29/2019    Procedure: SENTINEL LYMPH NODE BIOPSY; LYMPHATIC MAPPING WITH BLUE DYE AND RADIOACTIVE DYE (INJECT AT 0900 BY DR PALACIOS IN THE OR);   Surgeon: Ileana Barron MD;  [facility];  Service: Surgical Oncology    MAMMO NEEDLE LOCALIZATION LEFT (ALL INC) Left 10/29/2019    MAMMO STEREOTACTIC BREAST BIOPSY RIGHT (ALL INC) Right 9/17/2019    US GUIDANCE BREAST BIOPSY LEFT EACH ADDITIONAL Left 9/17/2019    US GUIDED BREAST BIOPSY LEFT COMPLETE Left 9/17/2019    WISDOM TOOTH EXTRACTION Bilateral      Family History   Problem Relation Age of Onset    No Known Problems Mother     Prostate cancer Father 79    Arthritis Father     No Known Problems Sister     No Known Problems Paternal Aunt     Nail disease Paternal Aunt     No Known Problems Paternal Aunt     No Known Problems Maternal Grandmother     No Known Problems Maternal Grandfather     No Known Problems Paternal Grandmother     No Known Problems Paternal Grandfather      Social History Socioeconomic History    Marital status: /Civil Union     Spouse name: Not on file    Number of children: Not on file    Years of education: Not on file    Highest education level: Not on file   Occupational History    Not on file   Tobacco Use    Smoking status: Former Smoker     Packs/day: 0 75     Years: 38 00     Pack years: 28 50     Quit date:      Years since quittin 6    Smokeless tobacco: Never Used   Vaping Use    Vaping Use: Never used   Substance and Sexual Activity    Alcohol use: Yes    Drug use: Never    Sexual activity: Yes     Partners: Female     Birth control/protection: None, Post-menopausal   Other Topics Concern    Not on file   Social History Narrative    Not on file     Social Determinants of Health     Financial Resource Strain: Not on file   Food Insecurity: Not on file   Transportation Needs: Not on file   Physical Activity: Not on file   Stress: Not on file   Social Connections: Not on file   Intimate Partner Violence: Not on file   Housing Stability: Not on file       Current Outpatient Medications:     anastrozole (ARIMIDEX) 1 mg tablet, Take 1 tablet (1 mg total) by mouth daily, Disp: 90 tablet, Rfl: 1    Cholecalciferol (VITAMIN D3) 1000 units CAPS, Take 1,000 capsules by mouth daily in the early morning , Disp: , Rfl:     Multiple Vitamins-Minerals (CENTRUM SILVER 50+WOMEN PO), Take 1 tablet by mouth daily in the early morning , Disp: , Rfl:     olmesartan-hydrochlorothiazide (BENICAR HCT) 40-12 5 MG per tablet, Take 1 tablet by mouth daily, Disp: 90 tablet, Rfl: 1    rosuvastatin (CRESTOR) 5 mg tablet, Take 1 tablet (5 mg total) by mouth 3 (three) times a week, Disp: 36 tablet, Rfl: 1  Allergies   Allergen Reactions    Adhesive [Medical Tape]      Vitals:    22 0923   BP: 128/80   Pulse: 80   Resp: 16   Temp: 98 3 °F (36 8 °C)   SpO2: 98%       Physical Exam  Constitutional:       General: She is not in acute distress       Appearance: Normal appearance  Cardiovascular:      Rate and Rhythm: Normal rate and regular rhythm  Pulses: Normal pulses  Heart sounds: Normal heart sounds  Pulmonary:      Effort: Pulmonary effort is normal       Breath sounds: Normal breath sounds  Chest:      Chest wall: No mass  Breasts:      Right: No swelling, bleeding, inverted nipple, mass, nipple discharge, skin change, tenderness, axillary adenopathy or supraclavicular adenopathy  Left: No swelling, bleeding, inverted nipple, mass, nipple discharge, skin change, tenderness, axillary adenopathy or supraclavicular adenopathy  Comments: Left breast lumpectomy scar  No masses, nodularity, skin changes, nipple changes or discharge, or adenopathy appreciated on physical exam      Abdominal:      General: Abdomen is flat  Palpations: Abdomen is soft  Lymphadenopathy:      Upper Body:      Right upper body: No supraclavicular, axillary or pectoral adenopathy  Left upper body: No supraclavicular, axillary or pectoral adenopathy  Skin:     General: Skin is warm  Neurological:      General: No focal deficit present  Mental Status: She is alert and oriented to person, place, and time  Psychiatric:         Mood and Affect: Mood normal          Behavior: Behavior normal            Results:    Imaging  No results found  I reviewed the above imaging data  Advance Care Planning/Advance Directives:  Discussed disease status, cancer treatment plans and/or cancer treatment goals with the patient

## 2022-08-08 ENCOUNTER — TELEPHONE (OUTPATIENT)
Dept: FAMILY MEDICINE CLINIC | Facility: CLINIC | Age: 67
End: 2022-08-08

## 2022-08-23 DIAGNOSIS — Z17.0 MALIGNANT NEOPLASM OF LOWER-OUTER QUADRANT OF LEFT BREAST OF FEMALE, ESTROGEN RECEPTOR POSITIVE (HCC): ICD-10-CM

## 2022-08-23 DIAGNOSIS — C50.512 MALIGNANT NEOPLASM OF LOWER-OUTER QUADRANT OF LEFT BREAST OF FEMALE, ESTROGEN RECEPTOR POSITIVE (HCC): ICD-10-CM

## 2022-08-23 DIAGNOSIS — D70.1 CHEMOTHERAPY INDUCED NEUTROPENIA (HCC): ICD-10-CM

## 2022-08-23 DIAGNOSIS — T45.1X5A CHEMOTHERAPY INDUCED NEUTROPENIA (HCC): ICD-10-CM

## 2022-08-23 RX ORDER — ANASTROZOLE 1 MG/1
1 TABLET ORAL DAILY
Qty: 90 TABLET | Refills: 0 | Status: SHIPPED | OUTPATIENT
Start: 2022-08-23

## 2022-11-22 DIAGNOSIS — T45.1X5A CHEMOTHERAPY INDUCED NEUTROPENIA (HCC): ICD-10-CM

## 2022-11-22 DIAGNOSIS — Z17.0 MALIGNANT NEOPLASM OF LOWER-OUTER QUADRANT OF LEFT BREAST OF FEMALE, ESTROGEN RECEPTOR POSITIVE (HCC): ICD-10-CM

## 2022-11-22 DIAGNOSIS — C50.512 MALIGNANT NEOPLASM OF LOWER-OUTER QUADRANT OF LEFT BREAST OF FEMALE, ESTROGEN RECEPTOR POSITIVE (HCC): ICD-10-CM

## 2022-11-22 DIAGNOSIS — D70.1 CHEMOTHERAPY INDUCED NEUTROPENIA (HCC): ICD-10-CM

## 2022-11-23 RX ORDER — ANASTROZOLE 1 MG/1
1 TABLET ORAL DAILY
Qty: 90 TABLET | Refills: 0 | Status: SHIPPED | OUTPATIENT
Start: 2022-11-23

## 2022-11-29 ENCOUNTER — HOSPITAL ENCOUNTER (OUTPATIENT)
Dept: MAMMOGRAPHY | Facility: CLINIC | Age: 67
Discharge: HOME | End: 2022-11-29

## 2022-11-29 VITALS — WEIGHT: 173 LBS | HEIGHT: 61 IN | BODY MASS INDEX: 32.66 KG/M2

## 2022-11-29 DIAGNOSIS — Z17.0 MALIGNANT NEOPLASM OF LOWER-OUTER QUADRANT OF LEFT BREAST OF FEMALE, ESTROGEN RECEPTOR POSITIVE (HCC): ICD-10-CM

## 2022-11-29 DIAGNOSIS — C50.512 MALIGNANT NEOPLASM OF LOWER-OUTER QUADRANT OF LEFT BREAST OF FEMALE, ESTROGEN RECEPTOR POSITIVE (HCC): ICD-10-CM

## 2023-01-16 ENCOUNTER — TELEPHONE (OUTPATIENT)
Dept: SURGICAL ONCOLOGY | Facility: CLINIC | Age: 68
End: 2023-01-16

## 2023-01-16 ENCOUNTER — TELEPHONE (OUTPATIENT)
Dept: HEMATOLOGY ONCOLOGY | Facility: CLINIC | Age: 68
End: 2023-01-16

## 2023-01-16 NOTE — TELEPHONE ENCOUNTER
CALL RETURN FORM   Reason for patient call? Patient requesting to schedule her first survivorship appointment with David Rdz    Patient's primary oncologist? David Rdz   Name of person the patient was calling for? Lexi Wayne   Any additional information to add, if applicable? Patient has questions regarding the survivorship program   Informed patient that the message will be forwarded to the team and someone will get back to them as soon as possible    Did you relay this information to the patient?  Yes

## 2023-02-16 ENCOUNTER — TELEPHONE (OUTPATIENT)
Dept: SURGICAL ONCOLOGY | Facility: CLINIC | Age: 68
End: 2023-02-16

## 2023-02-16 NOTE — TELEPHONE ENCOUNTER
Called and left message for patient to inform her appointment with Annette Guo on 3/10/23 was moved from 9:45am to 9am to accommodate 30 minutes for her appointment  Apologized for scheduling error  Provided call back number

## 2023-03-10 ENCOUNTER — OFFICE VISIT (OUTPATIENT)
Dept: SURGICAL ONCOLOGY | Facility: CLINIC | Age: 68
End: 2023-03-10

## 2023-03-10 VITALS
BODY MASS INDEX: 34.66 KG/M2 | WEIGHT: 183.6 LBS | RESPIRATION RATE: 16 BRPM | HEIGHT: 61 IN | TEMPERATURE: 98 F | DIASTOLIC BLOOD PRESSURE: 82 MMHG | SYSTOLIC BLOOD PRESSURE: 138 MMHG

## 2023-03-10 DIAGNOSIS — C50.512 MALIGNANT NEOPLASM OF LOWER-OUTER QUADRANT OF LEFT BREAST OF FEMALE, ESTROGEN RECEPTOR POSITIVE (HCC): Primary | ICD-10-CM

## 2023-03-10 DIAGNOSIS — Z17.0 MALIGNANT NEOPLASM OF LOWER-OUTER QUADRANT OF LEFT BREAST OF FEMALE, ESTROGEN RECEPTOR POSITIVE (HCC): Primary | ICD-10-CM

## 2023-03-10 DIAGNOSIS — Z79.811 USE OF ANASTROZOLE: ICD-10-CM

## 2023-03-10 NOTE — PROGRESS NOTES
Surgical Oncology Follow Up       [address] [facility] [place] [place] [place] [address] [address]    Alfa Primas  1955  [id number]  [facility] SURGICAL ONCOLOGY [facility] [place]  [address] [address]    Chief Complaint   Patient presents with   • Follow-up       Assessment/Plan:  1  Malignant neoplasm of lower-outer quadrant of left breast of female, estrogen receptor positive ([facility] [address] [facility] )  - 6 mo f/u visit- survivorship    2  Use of anastrozole  - Continue use per medical oncology    Discussion/Summary: Patient is a 60-year-old female who presents today for a six-month follow-up visit for left breast cancer diagnosed in September of 2019  Her pathology revealed invasive ductal carcinoma, ER 70%, DC negative, HER2 positive   She underwent genetic testing which was negative   She underwent a left lumpectomy and sentinel node biopsy by Dr Figueroa Hoff completed adjuvant chemotherapy, Herceptin monotherapy and adjuvant RT  She is currently taking anastrozole  She had a bilateral 3D diagnostic mammogram in November of 2022 which was BIRADS 2, category 2 density  No new complaints today and there are no concerns on today's exam  I will see her back in six months for a survivorship visit or sooner should the need arise  She was instructed to contact me with any changes or concerns in the interim  History of Present Illness:     Oncology History   Malignant neoplasm of lower-outer quadrant of left breast of female, estrogen receptor positive ([facility] [address] )   9/17/2019 Biopsy    Left breast ultrasound-guided biospy  Invasive ductal carcinoma   Grade 1  ER 70%  DC < 1%  HER2 3+     10/10/2019 Genetic Testing    The following genes were evaluated: MOI, BRCA1, BRCA2, CDH1, CHEK2, PALB2, PTEN, STK11, TP53  Negative result   No pathogenic sequence variants or deletions/dupllications identified  [employer]     10/29/2019 Surgery    Left breast lumpectomy with sentinel lymph node biopsy  Margins negative  0/1 Lymph Nodes  Anatomic/Prognostic Stage IIA    Dr Latrice Hyatt     12/6/2019 - 12/17/2020 Chemotherapy    pegfilgrastim (NEULASTA) subcutaneous injection 6 mg, 6 mg, Subcutaneous, Once, 1 of 1 cycle  Administration: 6 mg (12/30/2019)  pegfilgrastim (NEULASTA ONPRO) subcutaneous injection kit 6 mg, 6 mg, Subcutaneous, Once, 6 of 6 cycles  Administration: 6 mg (12/6/2019), 6 mg (12/27/2019), 6 mg (1/17/2020), 6 mg (2/7/2020), 6 mg (2/28/2020), 6 mg (3/20/2020)  fosaprepitant (EMEND) 150 mg in sodium chloride 0 9 % 255 mL IVPB, 150 mg, Intravenous, Once, 6 of 6 cycles  Administration: 150 mg (12/6/2019), 150 mg (12/27/2019), 150 mg (1/17/2020), 150 mg (2/7/2020), 150 mg (2/28/2020), 150 mg (3/20/2020)  CARBOplatin (PARAPLATIN) 616 8 mg in sodium chloride 0 9 % 250 mL IVPB, 616 8 mg, Intravenous, Once, 6 of 6 cycles  Administration: 616 8 mg (12/6/2019), 616 8 mg (12/27/2019), 616 8 mg (1/17/2020), 616 8 mg (2/7/2020), 616 8 mg (2/28/2020), 616 8 mg (3/20/2020)  DOCEtaxel (TAXOTERE) 106 2 mg in sodium chloride 0 9 % 250 mL chemo infusion, 60 mg/m2 = 106 2 mg (80 % of original dose 75 mg/m2), Intravenous, Once, 6 of 6 cycles  Dose modification: 60 mg/m2 (original dose 75 mg/m2, Cycle 1, Reason: Dose Not Tolerated), 75 mg/m2 (original dose 75 mg/m2, Cycle 2, Reason: Other (Must fill in a comment), Comment: LFT normalized  )  Administration: 106 2 mg (12/6/2019), 132 8 mg (12/27/2019), 132 8 mg (1/17/2020), 132 8 mg (2/7/2020), 132 8 mg (2/28/2020), 132 8 mg (3/20/2020)  trastuzumab (HERCEPTIN) 600 mg in sodium chloride 0 9 % 250 mL chemo infusion, 624 mg, Intravenous, Once, 18 of 18 cycles  Administration: 600 mg (12/6/2019), 450 mg (12/27/2019), 450 mg (4/10/2020), 450 mg (1/17/2020), 450 mg (2/7/2020), 450 mg (2/28/2020), 450 mg (3/20/2020), 450 mg (5/1/2020), 450 mg (5/22/2020), 450 mg (6/12/2020), 450 mg (7/2/2020), 450 mg (7/24/2020), 450 mg (8/14/2020), 450 mg (9/4/2020), 450 mg (9/28/2020), 450 mg (10/16/2020), 450 mg (11/6/2020), 450 mg (11/27/2020)     5/4/2020 - 6/2/2020 Radiation    Plan ID Energy Fractions Dose per Fraction (cGy) Dose Correction (cGy) Total Dose Delivered (cGy) Elapsed Days   BH L BOOST 6X 5 / 5 200 0 1,000 6   BH L BREAST 6X 16 / 16 266 0 4,256 22     Dr Justina Adan       6/2020 -  Chemotherapy    Anastrozole    Dr Margi Pryor          -Interval History: Patient presents today for follow-up visit for left breast cancer  She notices no changes on her self breast exam   She denies persistent headache, back pain or bone pain, cough or shortness of breath, abdominal pain  She had a bilateral mammogram in November which was benign  Review of Systems:  Review of Systems   Constitutional: Negative for activity change, appetite change, chills, fatigue, fever and unexpected weight change  Respiratory: Negative for cough and shortness of breath  Cardiovascular: Negative for chest pain  Gastrointestinal: Negative for abdominal pain, constipation, diarrhea, nausea and vomiting  Musculoskeletal: Negative for arthralgias, back pain, gait problem and myalgias  Skin: Negative for color change and rash  Neurological: Negative for dizziness and headaches  Hematological: Negative for adenopathy  Psychiatric/Behavioral: Negative for agitation and confusion  All other systems reviewed and are negative        Patient Active Problem List   Diagnosis   • Malignant neoplasm of lower-outer quadrant of left breast of female, estrogen receptor positive ([address] )   • Chemotherapy induced neutropenia ([address] )   • Port-A-Cath in place   • Hypertension   • Hyperlipidemia   • Use of anastrozole   • 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 [facility])     Past Medical History:   Diagnosis Date   • Breast cancer ([address] ) 10/2019    grade 3 ER Positive [place] negative Her 2 3+ disease left   • History of chemotherapy 03/2020   • History of radiation therapy 04/2020   • Hyperlipidemia    • Hypertension    • Vertigo     no medication     Past Surgical History:   Procedure Laterality Date   • BREAST BIOPSY Left 09/17/2019    Left U/S BX invasive mammary carcinoma   • BREAST BIOPSY Right 09/17/2021    benign   • BREAST LUMPECTOMY Left 10/29/2019    Procedure: BREAST NEEDLE LOCALIZED LUMPECTOMY (NEEDLE LOC AT 0800); Surgeon: Michael Ny MD;  [facility];  Service: Surgical Oncology   • FRACTURE SURGERY Left     surgery repair- elbow    • IR PORT PLACEMENT  12/03/2019   • IR PORT REMOVAL  12/29/2020   • LYMPH NODE BIOPSY Left 10/29/2019    Procedure: SENTINEL LYMPH NODE BIOPSY; LYMPHATIC MAPPING WITH BLUE DYE AND RADIOACTIVE DYE (INJECT AT 0900 BY DR PALACIOS IN THE OR);   Surgeon: Michael Ny MD;  [facility];  Service: Surgical Oncology   • MAMMO NEEDLE LOCALIZATION LEFT (ALL INC) Left 10/29/2019   • MAMMO STEREOTACTIC BREAST BIOPSY RIGHT (ALL INC) Right 09/17/2019   • US GUIDANCE BREAST BIOPSY LEFT EACH ADDITIONAL Left 09/17/2019   • US GUIDED BREAST BIOPSY LEFT COMPLETE Left 09/17/2019   • WISDOM TOOTH EXTRACTION Bilateral      Family History   Problem Relation Age of Onset   • No Known Problems Mother    • Prostate cancer Father 79   • Arthritis Father    • No Known Problems Sister    • No Known Problems Paternal Aunt    • Nail disease Paternal Aunt    • No Known Problems Paternal Aunt    • No Known Problems Maternal Grandmother    • No Known Problems Maternal Grandfather    • No Known Problems Paternal Grandmother    • No Known Problems Paternal Grandfather      Social History     Socioeconomic History   • Marital status: /Civil Union     Spouse name: Not on file   • Number of children: Not on file   • Years of education: Not on file   • Highest education level: Not on file   Occupational History   • Not on file   Tobacco Use   • Smoking status: Former     Packs/day: 0 75     Years: 38 00     Pack years: 28 50     Types: Cigarettes     Quit date: 2010     Years since quittin 1   • Smokeless tobacco: Never   Vaping Use   • Vaping Use: Never used   Substance and Sexual Activity   • Alcohol use: Yes   • Drug use: Never   • Sexual activity: Yes     Partners: Female     Birth control/protection: None, Post-menopausal   Other Topics Concern   • Not on file   Social History Narrative   • Not on file     Social Determinants of Health     Financial Resource Strain: Not on file   Food Insecurity: Not on file   Transportation Needs: Not on file   Physical Activity: Not on file   Stress: Not on file   Social Connections: Not on file   Intimate Partner Violence: Not on file   Housing Stability: Not on file       Current Outpatient Medications:   •  anastrozole (ARIMIDEX) 1 mg tablet, Take 1 tablet (1 mg total) by mouth daily, Disp: 90 tablet, Rfl: 0  •  Multiple Vitamins-Minerals (CENTRUM SILVER 50+WOMEN PO), Take 1 tablet by mouth daily in the early morning , Disp: , Rfl:   •  olmesartan-hydrochlorothiazide (BENICAR HCT) 40-12 5 MG per tablet, Take 1 tablet by mouth daily, Disp: 90 tablet, Rfl: 1  •  rosuvastatin (CRESTOR) 5 mg tablet, Take 1 tablet (5 mg total) by mouth 3 (three) times a week, Disp: 36 tablet, Rfl: 1  Allergies   Allergen Reactions   • Adhesive [Medical Tape]      Vitals:    03/10/23 0912   BP: 138/82   Resp: 16   Temp: 98 °F (36 7 °C)       Physical Exam  Vitals reviewed  Constitutional:       General: She is not in acute distress  Appearance: Normal appearance  She is well-developed  She is not diaphoretic  HENT:      Head: Normocephalic and atraumatic  Cardiovascular:      Rate and Rhythm: Normal rate and regular rhythm  Heart sounds: Normal heart sounds  Pulmonary:      Effort: Pulmonary effort is normal       Breath sounds: Normal breath sounds  Chest:   Breasts:     Right: No swelling, bleeding, inverted nipple, mass, nipple discharge, skin change or tenderness        Left: Swelling (very mild (much improved since last visit with me)) and skin change (surgical scars- breast, axilla) present  No bleeding, inverted nipple, mass, nipple discharge or tenderness  Abdominal:      Palpations: Abdomen is soft  There is no mass  Tenderness: There is no abdominal tenderness  Musculoskeletal:         General: Normal range of motion  Cervical back: Normal range of motion  Lymphadenopathy:      Upper Body:      Right upper body: No supraclavicular or axillary adenopathy  Left upper body: No supraclavicular or axillary adenopathy  Skin:     General: Skin is warm and dry  Findings: No rash  Neurological:      Mental Status: She is alert and oriented to person, place, and time  Psychiatric:         Speech: Speech normal            Advance Care Planning/Advance Directives:  Discussed disease status, cancer treatment plans and/or cancer treatment goals with the patient

## 2023-06-23 ENCOUNTER — OFFICE VISIT (OUTPATIENT)
Dept: HEMATOLOGY ONCOLOGY | Facility: CLINIC | Age: 68
End: 2023-06-23
Payer: COMMERCIAL

## 2023-06-23 VITALS
TEMPERATURE: 96.9 F | BODY MASS INDEX: 35.93 KG/M2 | DIASTOLIC BLOOD PRESSURE: 80 MMHG | OXYGEN SATURATION: 97 % | HEIGHT: 60 IN | WEIGHT: 183 LBS | HEART RATE: 95 BPM | RESPIRATION RATE: 18 BRPM | SYSTOLIC BLOOD PRESSURE: 118 MMHG

## 2023-06-23 DIAGNOSIS — D70.1 CHEMOTHERAPY INDUCED NEUTROPENIA (HCC): ICD-10-CM

## 2023-06-23 DIAGNOSIS — Z17.0 MALIGNANT NEOPLASM OF LOWER-OUTER QUADRANT OF LEFT BREAST OF FEMALE, ESTROGEN RECEPTOR POSITIVE (HCC): Primary | ICD-10-CM

## 2023-06-23 DIAGNOSIS — T45.1X5A CHEMOTHERAPY INDUCED NEUTROPENIA (HCC): ICD-10-CM

## 2023-06-23 DIAGNOSIS — C50.512 MALIGNANT NEOPLASM OF LOWER-OUTER QUADRANT OF LEFT BREAST OF FEMALE, ESTROGEN RECEPTOR POSITIVE (HCC): Primary | ICD-10-CM

## 2023-06-23 PROCEDURE — 99214 OFFICE O/P EST MOD 30 MIN: CPT | Performed by: INTERNAL MEDICINE

## 2023-06-23 RX ORDER — ANASTROZOLE 1 MG/1
1 TABLET ORAL DAILY
Qty: 90 TABLET | Refills: 3 | Status: SHIPPED | OUTPATIENT
Start: 2023-06-23

## 2023-06-23 NOTE — PROGRESS NOTES
Hematology / Oncology Outpatient Follow Up Note    Mihai Grey 78 y o  female :1955 [id number]:[id number]         Date:  2023    Assessment / Plan:  A 79-year-old postmenopausal woman with stage IIA left breast cancer, grade 3, ER 70% positive, NV negative, HER2 3+ disease   She underwent lumpectomy and sentinel lymph node biopsy, resulting in GREG   She is negative for BRCA gene mutation   She completed 6 cycle of adjuvant chemotherapy with TCH with excellent tolerance followed by adjuvant trastuzumab monotherapy  She is currently on adjuvant hormonal therapy with anastrozole with no toxicity  Clinically, she has no evidence of recurrent disease  I recommended her to continue anastrozole 1 mg once a day for 2 more years to complete 5 years of adjuvant hormonal therapy  She is in agreement with my recommendation  I will see her again in a year for routine follow-up         Subjective:      HPI:  A 52-year-old postmenopausal woman who noticed a lump in her left breast which she brought to medical attention   She underwent left breast biopsy in 2019 which showed invasive ductal carcinoma, grade 1  This was ER 70% positive, NV negative, HER2 3+ disease   She underwent genetic testing which was negative for BRCA gene mutation   She underwent lumpectomy and sentinel lymph node biopsy by Dr Zeina Hardy Fass to this, she had CT scan of chest abdomen pelvis which showed no obvious distant metastasis   However, she has multiple indeterminate liver lesions   Her LFT is mildly elevated   She has no history of viral hepatitis   However, she drinks 2 alcohol every day for nearly 40 years   Intermittently, she drinks more   Lumpectomy showed 2 2 cm of invasive ductal carcinoma, grade 3  There was no evidence of lymphovascular invasion   1 sentinel lymph node was negative for metastatic disease   She presents today to discuss adjuvant treatment options   She has no complaint of pain   Her weight is stable   She has no respiratory symptoms  Una Donovan has no family history of breast or ovarian cancer   She quit smoking nearly 10 years ago  Shalonda Shannan performance status is normal            Interval History:  A 78year-old postmenopausal woman with stage IIA left breast cancer, grade 3, ER 70% positive, LA negative, HER2 3+ disease   She underwent lumpectomy and sentinel lymph node biopsy, resulting in GREG   She is negative for BRCA gene mutation   She completed 6 cycle of adjuvant chemotherapy with [address] with excellent tolerance in March 2020  She completed trastuzumab adjuvant monotherapy in November 2020 without cardiac toxicity  She is currently on adjuvant hormonal therapy with anastrozole   She presents today for routine follow-up  She feels well with no new complaints  She has no hot flashes or musculoskeletal symptoms  She denied bone pain  Her weight is stable  She has no respiratory symptoms such as cough, sputum production or shortness of breath    Her Christelle status is normal             Objective:      Primary Diagnosis:     1  Left breast cancer, stage IIA (pT2, pN0, M0) grade 3, ER 70% positive, LA negative, HER2 3+ disease   Diagnosed in October 2019       2  BRCA gene mutation negative      Cancer Staging:  Cancer Staging  Malignant neoplasm of lower-outer quadrant of left breast of female, estrogen receptor positive ([facility] [address] )  Staging form: Breast, AJCC 8th Edition  - Pathologic: Stage IIA (pT2, pN0(sn), cM0, G3, ER+, LA-, HER2+) - Unsigned  Neoadjuvant therapy: No  Laterality: Left  Tumor size (mm): 22  Method of lymph node assessment: [place] lymph node biopsy  Histologic grading system: 3 grade system           Previous Hematologic/ Oncologic Treatment:       1  Adjuvant chemotherapy with TCH x6 cycle   Completed in March 2020       2  Adjuvant trastuzumab monotherapy , completed in November 2020       Current Hematologic/ Oncologic Treatment:       1  Adjuvant hormonal therapy with anastrozole since June 2020        Disease Status:      GREG is status post lumpectomy and sentinel lymph node biopsy      Test Results:     Pathology:     2 2 cm of invasive ductal carcinoma, grade 3  No evidence of lymphovascular invasion   1 sentinel lymph node was negative for metastatic disease   ER 70% positive, FL negative, HER2 3+ disease   Stage IIA (pT2, pN0, M0)     Radiology:     Mammography in November 2022 was benign   BI-RADS 2          Laboratory:     See below      Physical Exam:        General Appearance:    Alert, oriented          Eyes:    PERRL   Ears:    Normal external ear canals, both ears   Nose:   Nares normal, septum midline   Throat:   Mucosa moist  Pharynx without injection  Neck:   Supple         Lungs:     Clear to auscultation bilaterally   Chest Wall:    No tenderness or deformity    Heart:    Regular rate and rhythm         Abdomen:     Soft, non-tender, bowel sounds +, liver is palpable 3 cm below right costal margin   Spleen is not palpable                Extremities:   Extremities no cyanosis or edema         Skin:   no rash or icterus  Lymph nodes:   Cervical, supraclavicular, and axillary nodes normal   Neurologic:   CNII-XII intact, normal strength, sensation and reflexes     Throughout             Breast exam:   Lumpectomy scar at 6 o'clock position in her left breast with no palpable abnormalities   Right breast exam is negative  ROS: Review of Systems   All other systems reviewed and are negative  Imaging: No results found        Labs:   Lab Results   Component Value Date    WBC 5 53 08/05/2022    HGB 13 9 08/05/2022    HCT 42 4 08/05/2022    MCV 99 (H) 08/05/2022     08/05/2022     Lab Results   Component Value Date    K 3 6 08/05/2022    CL 99 08/05/2022    CO2 28 08/05/2022    BUN 10 08/05/2022    CREATININE 0 69 08/05/2022    GLUF 113 (H) 08/05/2022    CALCIUM 9 7 08/05/2022    AST 83 (H) 08/05/2022     (H) 08/05/2022    ALKPHOS 76 08/05/2022    EGFR 91 08/05/2022         Current Medications: Reviewed  Allergies: Reviewed  PMH/FH/SH:  Reviewed      Vital Sign:    Body surface area is 1 8 meters squared      Wt Readings from Last 3 Encounters:   06/23/23 83 kg (183 lb)   03/10/23 83 3 kg (183 lb 9 6 oz)   11/29/22 78 5 kg (173 lb)        Temp Readings from Last 3 Encounters:   06/23/23 (!) 96 9 °F (36 1 °C) (Tympanic)   03/10/23 98 °F (36 7 °C) (Temporal)   08/05/22 98 3 °F (36 8 °C) (Temporal)        BP Readings from Last 3 Encounters:   06/23/23 118/80   03/10/23 138/82   08/05/22 128/80         Pulse Readings from Last 3 Encounters:   06/23/23 95   08/05/22 80   07/11/22 104     @LASTSAO2(3)@

## 2023-07-18 DIAGNOSIS — I10 ESSENTIAL HYPERTENSION: ICD-10-CM

## 2023-07-18 RX ORDER — OLMESARTAN MEDOXOMIL AND HYDROCHLOROTHIAZIDE 40/12.5 40; 12.5 MG/1; MG/1
1 TABLET ORAL DAILY
Qty: 90 TABLET | Refills: 1 | Status: SHIPPED | OUTPATIENT
Start: 2023-07-18 | End: 2024-01-14

## 2023-07-18 RX ORDER — OLMESARTAN MEDOXOMIL AND HYDROCHLOROTHIAZIDE 40/12.5 40; 12.5 MG/1; MG/1
1 TABLET ORAL DAILY
Qty: 90 TABLET | Refills: 0 | OUTPATIENT
Start: 2023-07-18 | End: 2024-01-14

## 2023-07-27 ENCOUNTER — TELEPHONE (OUTPATIENT)
Dept: HEMATOLOGY ONCOLOGY | Facility: CLINIC | Age: 68
End: 2023-07-27

## 2023-07-27 NOTE — TELEPHONE ENCOUNTER
I called Deana Lopez regarding an appointment that they have scheduled with ROZ Hodge scheduled on 9/13/23     I left a voicemail explaining to patient that this appointment will need to be rescheduled due to a change in the providers schedule. Patient was advised to call [facility] to reschedule. A [employer] message (if applicable) has been sent to patient relaying the above information and advising patient to call [facility] and reschedule their appointment.

## 2023-07-28 ENCOUNTER — TELEPHONE (OUTPATIENT)
Dept: HEMATOLOGY ONCOLOGY | Facility: CLINIC | Age: 68
End: 2023-07-28

## 2023-08-02 ENCOUNTER — TELEPHONE (OUTPATIENT)
Dept: HEMATOLOGY ONCOLOGY | Facility: CLINIC | Age: 68
End: 2023-08-02

## 2023-08-02 NOTE — TELEPHONE ENCOUNTER
Appointment Change  Cancel, Reschedule, Change to Virtual      Who are you speaking with? Patient   If it is not the patient, are they listed on an active communication consent form? N/A   Which provider is the appointment scheduled with? ROZ Gonzalez   When is the appointment scheduled? Please list date and time 9/13/23 1pm   At which location is the appointment scheduled to take place? [facility]   Was the appointment rescheduled or changed from an in person visit to a virtual visit? If so, please list the details of the change. 9/26/23 2pm   What is the reason for the appointment change? provider   Was [facility] transport scheduled for this visit? N/A   Does [facility] transport need to be scheduled for the new visit (if applicable) N/A   Does the patient need an infusion appointment rescheduled? N/A   Does the patient have an infusion appointment scheduled? If so, when? No   Is the patient undergoing chemotherapy? N/A   Was the no-show policy reviewed for appointments being changed with less then 24 hours of notice?  N/A

## 2023-08-03 ENCOUNTER — TELEPHONE (OUTPATIENT)
Dept: HEMATOLOGY ONCOLOGY | Facility: CLINIC | Age: 68
End: 2023-08-03

## 2023-08-03 NOTE — TELEPHONE ENCOUNTER
Appointment Change  Cancel, Reschedule, Change to Virtual      Who are you speaking with? Patient   If it is not the patient, are they listed on an active communication consent form? N/A   Which provider is the appointment scheduled with? ROZ Patel   When is the appointment scheduled? Please list date and time  9/26/23 @ 2pm   At which location is the appointment scheduled to take place? [place]   Was the appointment rescheduled or changed from an in person visit to a virtual visit? If so, please list the details of the change. 10/3/23 @ 9am   What is the reason for the appointment change? Patient will be on vacation    no   Does [facility] transport need to be scheduled for the new visit (if applicable) no   Does the patient need an infusion appointment rescheduled? no   Does the patient have an infusion appointment scheduled? If so, when? no   Is the patient undergoing chemotherapy? no   Was the no-show policy reviewed for appointments being changed with less then 24 hours of notice?  yes

## 2023-08-11 ENCOUNTER — APPOINTMENT (OUTPATIENT)
Dept: LAB | Facility: CLINIC | Age: 68
End: 2023-08-11

## 2023-08-11 DIAGNOSIS — Z00.8 HEALTH EXAMINATION IN POPULATION SURVEY: ICD-10-CM

## 2023-08-11 LAB
CHOLEST SERPL-MCNC: 200 MG/DL
HDLC SERPL-MCNC: 77 MG/DL
LDLC SERPL CALC-MCNC: 109 MG/DL (ref 0–100)
NONHDLC SERPL-MCNC: 123 MG/DL
TRIGL SERPL-MCNC: 72 MG/DL

## 2023-08-11 PROCEDURE — 80061 LIPID PANEL: CPT

## 2023-08-11 PROCEDURE — 36415 COLL VENOUS BLD VENIPUNCTURE: CPT

## 2023-08-11 PROCEDURE — 83036 HEMOGLOBIN GLYCOSYLATED A1C: CPT

## 2023-08-12 LAB
EST. AVERAGE GLUCOSE BLD GHB EST-MCNC: 128 MG/DL
HBA1C MFR BLD: 6.1 %

## 2023-08-17 RX ORDER — AZITHROMYCIN 250 MG/1
TABLET, FILM COATED ORAL
COMMUNITY
Start: 2023-08-07 | End: 2023-08-21

## 2023-08-17 RX ORDER — AMOXICILLIN 500 MG/1
CAPSULE ORAL
COMMUNITY
Start: 2023-08-08 | End: 2023-08-21

## 2023-08-21 ENCOUNTER — OFFICE VISIT (OUTPATIENT)
Dept: FAMILY MEDICINE CLINIC | Facility: CLINIC | Age: 68
End: 2023-08-21
Payer: COMMERCIAL

## 2023-08-21 VITALS
OXYGEN SATURATION: 97 % | HEART RATE: 101 BPM | HEIGHT: 60 IN | SYSTOLIC BLOOD PRESSURE: 144 MMHG | DIASTOLIC BLOOD PRESSURE: 88 MMHG | TEMPERATURE: 97.5 F | BODY MASS INDEX: 35.89 KG/M2 | WEIGHT: 182.8 LBS

## 2023-08-21 DIAGNOSIS — I42.7 CARDIOMYOPATHY DUE TO DRUG AND EXTERNAL AGENT (HCC): ICD-10-CM

## 2023-08-21 DIAGNOSIS — Z00.00 ANNUAL PHYSICAL EXAM: Primary | ICD-10-CM

## 2023-08-21 DIAGNOSIS — I10 PRIMARY HYPERTENSION: ICD-10-CM

## 2023-08-21 DIAGNOSIS — C50.512 MALIGNANT NEOPLASM OF LOWER-OUTER QUADRANT OF LEFT BREAST OF FEMALE, ESTROGEN RECEPTOR POSITIVE (HCC): ICD-10-CM

## 2023-08-21 DIAGNOSIS — E66.01 OBESITY, MORBID (HCC): ICD-10-CM

## 2023-08-21 DIAGNOSIS — Z17.0 MALIGNANT NEOPLASM OF LOWER-OUTER QUADRANT OF LEFT BREAST OF FEMALE, ESTROGEN RECEPTOR POSITIVE (HCC): ICD-10-CM

## 2023-08-21 DIAGNOSIS — E78.2 MIXED HYPERLIPIDEMIA: ICD-10-CM

## 2023-08-21 PROCEDURE — 99214 OFFICE O/P EST MOD 30 MIN: CPT | Performed by: FAMILY MEDICINE

## 2023-08-21 PROCEDURE — 99396 PREV VISIT EST AGE 40-64: CPT | Performed by: FAMILY MEDICINE

## 2023-08-21 RX ORDER — ICOSAPENT ETHYL 500 MG/1
2 CAPSULE ORAL 2 TIMES DAILY
Qty: 360 CAPSULE | Refills: 3 | Status: SHIPPED | OUTPATIENT
Start: 2023-08-21 | End: 2024-08-15

## 2023-08-21 RX ORDER — EZETIMIBE 10 MG/1
10 TABLET ORAL DAILY
Qty: 90 TABLET | Refills: 1 | Status: SHIPPED | OUTPATIENT
Start: 2023-08-21 | End: 2024-02-17

## 2023-08-21 NOTE — PATIENT INSTRUCTIONS
Wellness Visit for Adults   AMBULATORY CARE:   A wellness visit  is when you see your healthcare provider to get screened for health problems. Your healthcare provider will also give you advice on how to stay healthy. Write down your questions so you remember to ask them. Ask your healthcare provider how often you should have a wellness visit. What happens at a wellness visit:  Your healthcare provider will ask about your health, and your family history of health problems. This includes high blood pressure, heart disease, and cancer. He or she will ask if you have symptoms that concern you, if you smoke, and about your mood. You may also be asked about your intake of medicines, supplements, food, and alcohol. Any of the following may be done:  • Your weight  will be checked. Your height may also be checked so your body mass index (BMI) can be calculated. Your BMI shows if you are at a healthy weight. • Your blood pressure  and heart rate will be checked. Your temperature may also be checked. • Blood and urine tests  may be done. Blood tests may be done to check your cholesterol levels. Abnormal cholesterol levels increase your risk for heart disease and stroke. You may also need a blood or urine test to check for diabetes if you are at increased risk. Urine tests may be done to look for signs of an infection or kidney disease. • A physical exam  includes checking your heartbeat and lungs with a stethoscope. Your healthcare provider may also check your skin to look for sun damage. • Screening tests  may be recommended. A screening test is done to check for diseases that may not cause symptoms. The screening tests you may need depend on your age, gender, family history, and lifestyle habits. For example, colorectal screening may be recommended if you are 48years old or older. Screening tests you need if you are a woman:   • A Pap smear  is used to screen for cervical cancer.  Pap smears are usually done every 3 to 5 years depending on your age. You may need them more often if you have had abnormal Pap smear test results in the past. Ask your healthcare provider how often you should have a Pap smear. • A mammogram  is an x-ray of your breasts to screen for breast cancer. Experts recommend mammograms every 2 years starting at age 48 years. You may need a mammogram at age 52 years or younger if you have an increased risk for breast cancer. Talk to your healthcare provider about when you should start having mammograms and how often you need them. Vaccines you may need:   • Get an influenza vaccine  every year. The influenza vaccine protects you from the flu. Several types of viruses cause the flu. The viruses change over time, so new vaccines are made each year. • Get a tetanus-diphtheria (Td) booster vaccine  every 10 years. This vaccine protects you against tetanus and diphtheria. Tetanus is a severe infection that may cause painful muscle spasms and lockjaw. Diphtheria is a severe bacterial infection that causes a thick covering in the back of your mouth and throat. • Get a human papillomavirus (HPV) vaccine  if you are female and aged 23 to 32 or male 23 to 24 and never received it. This vaccine protects you from HPV infection. HPV is the most common infection spread by sexual contact. HPV may also cause vaginal, penile, and anal cancers. • Get a pneumococcal vaccine  if you are aged 72 years or older. The pneumococcal vaccine is an injection given to protect you from pneumococcal disease. Pneumococcal disease is an infection caused by pneumococcal bacteria. The infection may cause pneumonia, meningitis, or an ear infection. • Get a shingles vaccine  if you are 60 or older, even if you have had shingles before. The shingles vaccine is an injection to protect you from the varicella-zoster virus. This is the same virus that causes chickenpox.  Shingles is a painful rash that develops in people who had chickenpox or have been exposed to the virus. How to eat healthy:  My Plate is a model for planning healthy meals. It shows the types and amounts of foods that should go on your plate. Fruits and vegetables make up about half of your plate, and grains and protein make up the other half. A serving of dairy is included on the side of your plate. The amount of calories and serving sizes you need depends on your age, gender, weight, and height. Examples of healthy foods are listed below:  • Eat a variety of vegetables  such as dark green, red, and orange vegetables. You can also include canned vegetables low in sodium (salt) and frozen vegetables without added butter or sauces. • Eat a variety of fresh fruits , canned fruit in 100% juice, frozen fruit, and dried fruit. • Include whole grains. At least half of the grains you eat should be whole grains. Examples include whole-wheat bread, wheat pasta, brown rice, and whole-grain cereals such as oatmeal.    • Eat a variety of protein foods such as seafood (fish and shellfish), lean meat, and poultry without skin (turkey and chicken). Examples of lean meats include pork leg, shoulder, or tenderloin, and beef round, sirloin, tenderloin, and extra lean ground beef. Other protein foods include eggs and egg substitutes, beans, peas, soy products, nuts, and seeds. • Choose low-fat dairy products such as skim or 1% milk or low-fat yogurt, cheese, and cottage cheese. • Limit unhealthy fats  such as butter, hard margarine, and shortening. Exercise:  Exercise at least 30 minutes per day on most days of the week. Some examples of exercise include walking, biking, dancing, and swimming. You can also fit in more physical activity by taking the stairs instead of the elevator or parking farther away from stores. Include muscle strengthening activities 2 days each week. Regular exercise provides many health benefits.  It helps you manage your weight, and decreases your risk for type 2 diabetes, heart disease, stroke, and high blood pressure. Exercise can also help improve your mood. Ask your healthcare provider about the best exercise plan for you. General health and safety guidelines:   • Do not smoke. Nicotine and other chemicals in cigarettes and cigars can cause lung damage. Ask your healthcare provider for information if you currently smoke and need help to quit. E-cigarettes or smokeless tobacco still contain nicotine. Talk to your healthcare provider before you use these products. • Limit alcohol. A drink of alcohol is 12 ounces of beer, 5 ounces of wine, or 1½ ounces of liquor. • Lose weight, if needed. Being overweight increases your risk of certain health conditions. These include heart disease, high blood pressure, type 2 diabetes, and certain types of cancer. • Protect your skin. Do not sunbathe or use tanning beds. Use sunscreen with a SPF 15 or higher. Apply sunscreen at least 15 minutes before you go outside. Reapply sunscreen every 2 hours. Wear protective clothing, hats, and sunglasses when you are outside. • Drive safely. Always wear your seatbelt. Make sure everyone in your car wears a seatbelt. A seatbelt can save your life if you are in an accident. Do not use your cell phone when you are driving. This could distract you and cause an accident. Pull over if you need to make a call or send a text message. • Practice safe sex. Use latex condoms if are sexually active and have more than one partner. Your healthcare provider may recommend screening tests for sexually transmitted infections (STIs). • Wear helmets, lifejackets, and protective gear. Always wear a helmet when you ride a bike or motorcycle, go skiing, or play sports that could cause a head injury. Wear protective equipment when you play sports. Wear a lifejacket when you are on a boat or doing water sports.     © Copyright [employer] 2022 Information is for End User's use only and may not be sold, redistributed or otherwise used for commercial purposes. The above information is an  only. It is not intended as medical advice for individual conditions or treatments. Talk to your doctor, nurse or pharmacist before following any medical regimen to see if it is safe and effective for you. Weight Management   AMBULATORY CARE:   Why it is important to manage your weight:  Being overweight increases your risk of health conditions such as heart disease, high blood pressure, type 2 diabetes, and certain types of cancer. It can also increase your risk for osteoarthritis, sleep apnea, and other respiratory problems. Aim for a slow, steady weight loss. Even a small amount of weight loss can lower your risk of health problems. Risks of being overweight:  Extra weight can cause many health problems, including the following:  • Diabetes (high blood sugar level)    • High blood pressure or high cholesterol    • Heart disease    • Stroke    • Gallbladder or liver disease    • Cancer of the colon, breast, prostate, liver, or kidney    • Sleep apnea    • Arthritis or gout    Screening  is done to check for health conditions before you have signs or symptoms. If you are 28to 79years old, your blood sugar level may be checked every 3 years for signs of prediabetes or diabetes. Your healthcare provider will check your blood pressure at each visit. High blood pressure can lead to a stroke or other problems. Your provider may check for signs of heart disease, cancer, or other health problems. How to lose weight safely:  A safe and healthy way to lose weight is to eat fewer calories and get regular exercise. • You can lose up about 1 pound a week by decreasing the number of calories you eat by 500 calories each day. You can decrease calories by eating smaller portion sizes or by cutting out high-calorie foods.  Read labels to find out how many calories are in the foods you eat.         • You can also burn calories with exercise such as walking, swimming, or biking. You will be more likely to keep weight off if you make these changes part of your lifestyle. Exercise at least 30 minutes per day on most days of the week. You can also fit in more physical activity by taking the stairs instead of the elevator or parking farther away from stores. Ask your healthcare provider about the best exercise plan for you. Healthy meal plan for weight management:  A healthy meal plan includes a variety of foods, contains fewer calories, and helps you stay healthy. A healthy meal plan includes the following:     • Eat whole-grain foods more often. A healthy meal plan should contain fiber. Fiber is the part of grains, fruits, and vegetables that is not broken down by your body. Whole-grain foods are healthy and provide extra fiber in your diet. Some examples of whole-grain foods are whole-wheat breads and pastas, oatmeal, brown rice, and bulgur. • Eat a variety of vegetables every day. Include dark, leafy greens such as spinach, kale, mehran greens, and mustard greens. Eat yellow and orange vegetables such as carrots, sweet potatoes, and winter squash. • Eat a variety of fruits every day. Choose fresh or canned fruit (canned in its own juice or light syrup) instead of juice. Fruit juice has very little or no fiber. • Eat low-fat dairy foods. Drink fat-free (skim) milk or 1% milk. Eat fat-free yogurt and low-fat cottage cheese. Try low-fat cheeses such as mozzarella and other reduced-fat cheeses. • Choose meat and other protein foods that are low in fat. Choose beans or other legumes such as split peas or lentils. Choose fish, skinless poultry (chicken or turkey), or lean cuts of red meat (beef or pork). Before you cook meat or poultry, cut off any visible fat. • Use less fat and oil. Try baking foods instead of frying them.  Add less fat, such as margarine, sour cream, regular salad dressing and mayonnaise to foods. Eat fewer high-fat foods. Some examples of high-fat foods include french fries, doughnuts, ice cream, and cakes. • Eat fewer sweets. Limit foods and drinks that are high in sugar. This includes candy, cookies, regular soda, and sweetened drinks. Ways to decrease calories:   • Eat smaller portions. ? Use a small plate with smaller servings. ? Do not eat second helpings. ? When you eat at a restaurant, ask for a box and place half of your meal in the box before you eat. ? Share an entrée with someone else. • Replace high-calorie snacks with healthy, low-calorie snacks. ? Choose fresh fruit, vegetables, fat-free rice cakes, or air-popped popcorn instead of potato chips, nuts, or chocolate. ? Choose water or calorie-free drinks instead of soda or sweetened drinks. • Do not shop for groceries when you are hungry. You may be more likely to make unhealthy food choices. Take a grocery list of healthy foods and shop after you have eaten. • Eat regular meals. Do not skip meals. Skipping meals can lead to overeating later in the day. This can make it harder for you to lose weight. Eat a healthy snack in place of a meal if you do not have time to eat a regular meal. Talk with a dietitian to help you create a meal plan and schedule that is right for you. Other things to consider as you try to lose weight:   • Be aware of situations that may give you the urge to overeat, such as eating while watching television. Find ways to avoid these situations. For example, read a book, go for a walk, or do crafts. • Meet with a weight loss support group or friends who are also trying to lose weight. This may help you stay motivated to continue working on your weight loss goals. © Copyright [employer] 2022 Information is for End User's use only and may not be sold, redistributed or otherwise used for commercial purposes.   The above information is an  only. It is not intended as medical advice for individual conditions or treatments. Talk to your doctor, nurse or pharmacist before following any medical regimen to see if it is safe and effective for you. Cholesterol and Your Health   AMBULATORY CARE:   Cholesterol  is a waxy, fat-like substance. Your body uses cholesterol to make hormones and new cells, and to protect nerves. Cholesterol is made by your body. It also comes from certain foods you eat, such as meat and dairy products. Your healthcare provider can help you set goals for your cholesterol levels. He or she can help you create a plan to meet your goals. Cholesterol level goals: Your cholesterol level goals depend on your risk for heart disease, your age, and your other health conditions. The following are general guidelines:  • Total cholesterol  includes low-density lipoprotein (LDL), high-density lipoprotein (HDL), and triglyceride levels. The total cholesterol level should be lower than 200 mg/dL and is best at about 150 mg/dL. • LDL cholesterol  is called bad cholesterol  because it forms plaque in your arteries. As plaque builds up, your arteries become narrow, and less blood flows through. When plaque decreases blood flow to your heart, you may have chest pain. If plaque completely blocks an artery that brings blood to your heart, you may have a heart attack. Plaque can break off and form blood clots. Blood clots may block arteries in your brain and cause a stroke. The level should be less than 130 mg/dL and is best at about 100 mg/dL. • HDL cholesterol  is called good cholesterol  because it helps remove LDL cholesterol from your arteries. It does this by attaching to LDL cholesterol and carrying it to your liver. Your liver breaks down LDL cholesterol so your body can get rid of it. High levels of HDL cholesterol can help prevent a heart attack and stroke.  Low levels of HDL cholesterol can increase your risk for heart disease, heart attack, and stroke. The level should be 60 mg/dL or higher. • Triglycerides  are a type of fat that store energy from foods you eat. High levels of triglycerides also cause plaque buildup. This can increase your risk for a heart attack or stroke. If your triglyceride level is high, your LDL cholesterol level may also be high. The level should be less than 150 mg/dL. Any of the following can increase your risk for high cholesterol:   • Smoking cigarettes    • Being overweight or obese, or not getting enough exercise    • Drinking large amounts of alcohol    • A medical condition such as hypertension (high blood pressure) or diabetes    • Certain genes passed from your parents to you    • Age older than 65 years    What you need to know about having your cholesterol levels checked: Adults 21to 39years of age should have their cholesterol levels checked every 4 to 6 years. Adults 45 years or older should have their cholesterol checked every 1 to 2 years. You may need your cholesterol checked more often, or at a younger age, if you have risk factors for heart disease. You may also need to have your cholesterol checked more often if you have other health conditions, such as diabetes. Blood tests are used to check cholesterol levels. Blood tests measure your levels of triglycerides, LDL cholesterol, and HDL cholesterol. How healthy fats affect your cholesterol levels:  Healthy fats, also called unsaturated fats, help lower LDL cholesterol and triglyceride levels. Healthy fats include the following:  • Monounsaturated fats  are found in foods such as olive oil, canola oil, avocado, nuts, and olives. • Polyunsaturated fats,  such as omega 3 fats, are found in fish, such as salmon, trout, and tuna. They can also be found in plant foods such as flaxseed, walnuts, and soybeans.     How unhealthy fats affect your cholesterol levels:  Unhealthy fats increase LDL cholesterol and triglyceride levels. They are found in foods high in cholesterol, saturated fat, and trans fat:  • Cholesterol  is found in eggs, dairy, and meat. • Saturated fat  is found in butter, cheese, ice cream, whole milk, and coconut oil. Saturated fat is also found in meat, such as sausage, hot dogs, and bologna. • Trans fat  is found in liquid oils and is used in fried and baked foods. Foods that contain trans fats include chips, crackers, muffins, sweet rolls, microwave popcorn, and cookies. Treatment  for high cholesterol will also decrease your risk of heart disease, heart attack, and stroke. Treatment may include any of the following:  • Lifestyle changes  may include food, exercise, weight loss, and quitting smoking. You may also need to decrease the amount of alcohol you drink. Your healthcare provider will want you to start with lifestyle changes. Other treatment may be added if lifestyle changes are not enough. Your healthcare provider may recommend you work with a team to manage hyperlipidemia. The team may include medical experts such as a dietitian, an exercise or physical therapist, and a behavior therapist. Your family members may be included in helping you create lifestyle changes. • Medicines  may be given to lower your LDL cholesterol, triglyceride levels, or total cholesterol level. You may need medicines to lower your cholesterol if any of the following is true:    ? You have a history of stroke, TIA, unstable angina, or a heart attack. ? Your LDL cholesterol level is 190 mg/dL or higher. ? You are age 36 to 76 years, have diabetes or heart disease risk factors, and your LDL cholesterol is 70 mg/dL or higher. • Supplements  include fish oil, red yeast rice, and garlic. Fish oil may help lower your triglyceride and LDL cholesterol levels. It may also increase your HDL cholesterol level. Red yeast rice may help decrease your total cholesterol level and LDL cholesterol level.  Garlic may help lower your total cholesterol level. Do not take any supplements without talking to your healthcare provider. Food changes you can make to lower your cholesterol levels:  A dietitian can help you create a healthy eating plan. He or she can show you how to read food labels and choose foods low in saturated fat, trans fats, and cholesterol. • Decrease the total amount of fat you eat. Choose lean meats, fat-free or 1% fat milk, and low-fat dairy products, such as yogurt and cheese. Try to limit or avoid red meats. Limit or do not eat fried foods or baked goods, such as cookies. • Replace unhealthy fats with healthy fats. Cook foods in olive oil or canola oil. Choose soft margarines that are low in saturated fat and trans fat. Seeds, nuts, and avocados are other examples of healthy fats. • Eat foods with omega-3 fats. Examples include salmon, tuna, mackerel, walnuts, and flaxseed. Eat fish 2 times per week. Pregnant women should not eat fish that have high levels of mercury, such as shark, swordfish, and barbara mackerel. • Increase the amount of high-fiber foods you eat. High-fiber foods can help lower your LDL cholesterol. Aim to get between 20 and 30 grams of fiber each day. Fruits and vegetables are high in fiber. Eat at least 5 servings each day. Other high-fiber foods are whole-grain or whole-wheat breads, pastas, or cereals, and brown rice. Eat 3 ounces of whole-grain foods each day. Increase fiber slowly. You may have abdominal discomfort, bloating, and gas if you add fiber to your diet too quickly. • Eat healthy protein foods. Examples include low-fat dairy products, skinless chicken and turkey, fish, and nuts. • Limit foods and drinks that are high in sugar. Your dietitian or healthcare provider can help you create daily limits for high-sugar foods and drinks. The limit may be lower if you have diabetes or another health condition.  Limits can also help you lose weight if needed. Lifestyle changes you can make to lower your cholesterol levels:   • Maintain a healthy weight. Ask your healthcare provider what a healthy weight is for you. Ask him or her to help you create a weight loss plan if needed. Weight loss can decrease your total cholesterol and triglyceride levels. Weight loss may also help keep your blood pressure at a healthy level. • Be physically active throughout the day. Physical activity, such as exercise, can help lower your total cholesterol level and maintain a healthy weight. Physical activity can also help increase your HDL cholesterol level. Work with your healthcare provider to create an program that is right for you. Get at least 30 to 40 minutes of moderate physical activity most days of the week. Examples of exercise include brisk walking, swimming, or biking. Also include strength training at least 2 times each week. Your healthcare providers can help you create a physical activity plan. • Do not smoke. Nicotine and other chemicals in cigarettes and cigars can raise your cholesterol levels. Ask your healthcare provider for information if you currently smoke and need help to quit. E-cigarettes or smokeless tobacco still contain nicotine. Talk to your healthcare provider before you use these products. • Limit or do not drink alcohol. Alcohol can increase your triglyceride levels. Ask your healthcare provider before you drink alcohol. Ask how much is okay for you to drink in 24 hours or 1 week. Follow up with your doctor as directed:  Write down your questions so you remember to ask them during your visits. © Copyright [employer] 2022 Information is for End User's use only and may not be sold, redistributed or otherwise used for commercial purposes. The above information is an  only. It is not intended as medical advice for individual conditions or treatments.  Talk to your doctor, nurse or pharmacist before following any medical regimen to see if it is safe and effective for you.

## 2023-08-21 NOTE — PROGRESS NOTES
ADULT ANNUAL PHYSICAL  [address]    NAME: Rhianna Cartagena  AGE: 78 y.o. 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 ([address])    Relevant Orders    Lipid panel    Comprehensive metabolic panel       Other    Malignant neoplasm of lower-outer quadrant of left breast of female, estrogen receptor positive (HCC)    Hyperlipidemia    Relevant Medications    ezetimibe (ZETIA) 10 mg tablet    Icosapent Ethyl (Vascepa) 0.5 g CAPS    Other Relevant Orders    Lipid panel    Obesity, morbid ([address])   Other Visit Diagnoses     Annual physical exam    -  Primary    Relevant Orders    Lipid panel    Comprehensive metabolic panel    BMI 98.4-02.3,VCIIX              Immunizations and preventive care screenings were discussed with patient today. Appropriate education was printed on patient's after visit summary. Counseling:  Alcohol/drug use: discussed moderation in alcohol intake, the recommendations for healthy alcohol use, and avoidance of illicit drug use. Dental Health: discussed importance of regular tooth brushing, flossing, and dental visits. Injury prevention: discussed safety/seat belts, safety helmets, smoke detectors, carbon dioxide detectors, and smoking near bedding or upholstery. Sexual health: discussed sexually transmitted diseases, partner selection, use of condoms, avoidance of unintended pregnancy, and contraceptive alternatives. · Exercise: the importance of regular exercise/physical activity was discussed. Recommend exercise 3-5 times per week for at least 30 minutes. BMI Counseling:  Body mass index is 35.7 kg/m². The BMI is above normal. Nutrition recommendations include decreasing portion sizes, encouraging healthy choices of fruits and vegetables, decreasing fast food intake, consuming healthier snacks, limiting drinks that contain sugar, moderation in carbohydrate intake, increasing intake of lean protein, reducing intake of saturated and trans fat and reducing intake of cholesterol. Exercise recommendations include moderate physical activity 150 minutes/week. No pharmacotherapy was ordered. Rationale for BMI follow-up plan is due to patient being overweight or obese. Depression Screening and Follow-up Plan: Patient was screened for depression during today's encounter. They screened negative with a PHQ-2 score of 0. Return in 6 months (on 2/21/2024). Chief Complaint:     Chief Complaint   Patient presents with   • Well Check     Pt is here for yearly physical. BMI f/u due. Pt has no other concerns. LS      History of Present Illness:     Adult Annual Physical   Patient here for a comprehensive physical exam. The patient reports problems - see list.    Diet and Physical Activity  · Diet/Nutrition: well balanced diet. · Exercise: moderate cardiovascular exercise. Depression Screening  PHQ-2/9 Depression Screening    Little interest or pleasure in doing things: 0 - not at all  Feeling down, depressed, or hopeless: 0 - not at all  PHQ-2 Score: 0  PHQ-2 Interpretation: Negative depression screen       General Health  · Sleep: sleeps well. · Hearing: normal - bilateral.  · Vision: no vision problems. · Dental: regular dental visits. /GYN Health  · Patient is: postmenopausal  · Last menstrual period: na  · Contraceptive method: na.     Review of Systems:     Review of Systems   Constitutional: Negative. HENT: Negative. Eyes: Negative. Respiratory: Negative. Cardiovascular: Negative. Gastrointestinal: Negative. Endocrine: Negative. Genitourinary: Negative. Musculoskeletal: Negative. Skin: Negative. Allergic/Immunologic: Negative. Neurological: Negative. Hematological: Negative. Psychiatric/Behavioral: Negative. All other systems reviewed and are negative. Past Medical History:     Past Medical History:   Diagnosis Date   • Breast cancer ([address]) 10/2019    grade 3 ER Positive CT negative Her 2 3+ disease left   • History of chemotherapy 03/2020   • History of radiation therapy 04/2020   • Hyperlipidemia    • Hypertension    • Vertigo     no medication      Past Surgical History:     Past Surgical History:   Procedure Laterality Date   • BREAST BIOPSY Left 09/17/2019    Left U/S BX invasive mammary carcinoma   • BREAST BIOPSY Right 09/17/2021    benign   • BREAST LUMPECTOMY Left 10/29/2019    Procedure: BREAST NEEDLE LOCALIZED LUMPECTOMY (NEEDLE LOC AT 0800); Surgeon: Jose Romeo MD;  [facility];  Service: Surgical Oncology   • FRACTURE SURGERY Left     surgery repair- elbow    • IR PORT PLACEMENT  12/03/2019   • IR PORT REMOVAL  12/29/2020   • LYMPH NODE BIOPSY Left 10/29/2019    Procedure: SENTINEL LYMPH NODE BIOPSY; LYMPHATIC MAPPING WITH BLUE DYE AND RADIOACTIVE DYE (INJECT AT 0900 BY DR PALACIOS IN THE OR);   Surgeon: Jose Romeo MD;  [facility];  Service: Surgical Oncology   • MAMMO NEEDLE LOCALIZATION LEFT (ALL INC) Left 10/29/2019   • MAMMO STEREOTACTIC BREAST BIOPSY RIGHT (ALL INC) Right 09/17/2019   • US GUIDANCE BREAST BIOPSY LEFT EACH ADDITIONAL Left 09/17/2019   • US GUIDED BREAST BIOPSY LEFT COMPLETE Left 09/17/2019   • WISDOM TOOTH EXTRACTION Bilateral       Social History:     Social History     Socioeconomic History   • Marital status: /Civil Union     Spouse name: None   • Number of children: None   • Years of education: None   • Highest education level: None   Occupational History   • None   Tobacco Use   • Smoking status: Former     Packs/day: 0.75     Years: 38.00     Total pack years: 28.50     Types: Cigarettes     Quit date:      Years since quittin.6   • Smokeless tobacco: Never   Vaping Use   • Vaping Use: Never used   Substance and Sexual Activity   • Alcohol use:  Yes   • Drug use: Never   • Sexual activity: Yes     Partners: Female     Birth control/protection: None, Post-menopausal   Other Topics Concern   • None   Social History Narrative   • None     Social Determinants of Health     Financial Resource Strain: Not on file   Food Insecurity: Not on file   Transportation Needs: Not on file   Physical Activity: Not on file   Stress: Not on file   Social Connections: Not on file   Intimate Partner Violence: Not on file   Housing Stability: Not on file      Family History:     Family History   Problem Relation Age of Onset   • No Known Problems Mother    • Prostate cancer Father 79   • Arthritis Father    • No Known Problems Sister    • No Known Problems Paternal Aunt    • Nail disease Paternal Aunt    • No Known Problems Paternal Aunt    • No Known Problems Maternal Grandmother    • No Known Problems Maternal Grandfather    • No Known Problems Paternal Grandmother    • No Known Problems Paternal Grandfather       Current Medications:     Current Outpatient Medications   Medication Sig Dispense Refill   • anastrozole (ARIMIDEX) 1 mg tablet Take 1 tablet (1 mg total) by mouth daily 90 tablet 3   • ezetimibe (ZETIA) 10 mg tablet Take 1 tablet (10 mg total) by mouth daily 90 tablet 1   • Icosapent Ethyl (Vascepa) 0.5 g CAPS Take 2 capsules by mouth 2 (two) times a day 360 capsule 3   • Multiple Vitamins-Minerals (CENTRUM SILVER 50+WOMEN PO) Take 1 tablet by mouth daily in the early morning      • olmesartan-hydrochlorothiazide (BENICAR HCT) 40-12.5 MG per tablet Take 1 tablet by mouth daily 90 tablet 1   • rosuvastatin (CRESTOR) 5 mg tablet Take 1 tablet (5 mg total) by mouth 3 (three) times a week 36 tablet 1   • amoxicillin (AMOXIL) 500 mg capsule  (Patient not taking: Reported on 2023)     • azithromycin (ZITHROMAX) 250 mg tablet  (Patient not taking: Reported on 8/21/2023)       No current facility-administered medications for this visit. Allergies: Allergies   Allergen Reactions   • Adhesive [Medical Tape]       Physical Exam:     /88 (BP Location: Left arm, Patient Position: Sitting, Cuff Size: Standard)   Pulse 101   Temp 97.5 °F (36.4 °C) (Tympanic)   Ht 5' (1.524 m)   Wt 82.9 kg (182 lb 12.8 oz)   LMP  (LMP Unknown)   SpO2 97%   BMI 35.70 kg/m²     Physical Exam  Vitals and nursing note reviewed. Constitutional:       Appearance: Normal appearance. She is well-developed. HENT:      Head: Normocephalic. Eyes:      Pupils: Pupils are equal, round, and reactive to light. Cardiovascular:      Rate and Rhythm: Normal rate and regular rhythm. Heart sounds: Normal heart sounds. Pulmonary:      Effort: Pulmonary effort is normal.      Breath sounds: Normal breath sounds. Abdominal:      General: Bowel sounds are normal.      Palpations: Abdomen is soft. Musculoskeletal:      Cervical back: Normal range of motion and neck supple. Skin:     General: Skin is warm and dry. Capillary Refill: Capillary refill takes less than 2 seconds. Neurological:      General: No focal deficit present. Mental Status: She is alert and oriented to person, place, and time.    Psychiatric:         Mood and Affect: Mood normal.         Behavior: Behavior normal.          Sammie Marcos, DO  [address] [place] [address]

## 2023-10-03 ENCOUNTER — ONCOLOGY SURVIVORSHIP (OUTPATIENT)
Dept: SURGICAL ONCOLOGY | Facility: CLINIC | Age: 68
End: 2023-10-03
Payer: COMMERCIAL

## 2023-10-03 ENCOUNTER — PATIENT OUTREACH (OUTPATIENT)
Dept: CASE MANAGEMENT | Facility: HOSPITAL | Age: 68
End: 2023-10-03

## 2023-10-03 VITALS
HEART RATE: 102 BPM | BODY MASS INDEX: 35.85 KG/M2 | TEMPERATURE: 97.2 F | OXYGEN SATURATION: 97 % | DIASTOLIC BLOOD PRESSURE: 88 MMHG | SYSTOLIC BLOOD PRESSURE: 142 MMHG | WEIGHT: 182.6 LBS | HEIGHT: 60 IN

## 2023-10-03 DIAGNOSIS — Z17.0 MALIGNANT NEOPLASM OF LOWER-OUTER QUADRANT OF LEFT BREAST OF FEMALE, ESTROGEN RECEPTOR POSITIVE: Primary | ICD-10-CM

## 2023-10-03 DIAGNOSIS — Z79.811 USE OF ANASTROZOLE: ICD-10-CM

## 2023-10-03 DIAGNOSIS — C50.512 MALIGNANT NEOPLASM OF LOWER-OUTER QUADRANT OF LEFT BREAST OF FEMALE, ESTROGEN RECEPTOR POSITIVE: Primary | ICD-10-CM

## 2023-10-03 PROCEDURE — 99214 OFFICE O/P EST MOD 30 MIN: CPT | Performed by: NURSE PRACTITIONER

## 2023-10-03 NOTE — PROGRESS NOTES
Assessment/Plan:    Diagnoses and all orders for this visit:    Malignant neoplasm of lower-outer quadrant of left breast of female, estrogen receptor positive   -     Mammo diagnostic bilateral w 3d & cad; Future  -     Ambulatory referral to oncology social worker; Future    Use of anastrozole    Patient is a 52-year-old female that was diagnosed with a left-sided breast cancer in September 2019. Her pathology revealed invasive ductal carcinoma, ER 70%, CT negative, HER2 positive.  She underwent genetic testing which was negative.  She underwent a left lumpectomy and sentinel node biopsy by Dr. Liudmila Kemp. Esther Sandoval completed adjuvant chemotherapy with TCH, Herceptin monotherapy and adjuvant RT. She is currently taking anastrozole. Breast cancer survivorship visit performed today and treatment summary and care plan were reviewed with patient. She had a bilateral 3D diagnostic mammogram in November 2022 was BI-RADS 2, category 2 density. Breast cancer survivorship visit performed today and treatment summary and care plan were reviewed with patient. She offers no new complaints today and there are no worrisome findings on today's exam.  I have recommended an antifungal cream for underneath her bilateral breast.  She would like to think about the strength ABC program and will contact me if she is interested in a referral.  We also discussed her cardiovascular risk factors and discussed the option of meeting with cardio oncology. Patient would also like to think about this. She is up-to-date on colorectal cancer screening. She is due for osteoporosis screening but would like to discuss this further with her PCP at her upcoming visit. I will make arrangements for her mammogram next month and I will see her back in 6 months for a routine follow-up visit or sooner should the need arise. She was instructed to contact me with any changes or concerns in the interim. All of her questions were answered today.       REASON FOR VISIT:   Survivorship      Previous therapy:  Oncology History   Malignant neoplasm of lower-outer quadrant of left breast of female, estrogen receptor positive    9/17/2019 Biopsy    Left breast ultrasound-guided biospy  Invasive ductal carcinoma   Grade 1  ER 70%  WA < 1%  HER2 3+     10/10/2019 Genetic Testing    The following genes were evaluated: MOI, BRCA1, BRCA2, CDH1, CHEK2, PALB2, PTEN, STK11, TP53  Negative result. No pathogenic sequence variants or deletions/dupllications identified  [facility]     10/29/2019 Surgery    Left breast lumpectomy with sentinel lymph node biopsy  Margins negative  0/1 Lymph Nodes  Anatomic/Prognostic Stage IIA    Dr. Zo Mosley     12/6/2019 - 12/17/2020 Chemotherapy    pegfilgrastim (NEULASTA) subcutaneous injection 6 mg, 6 mg, Subcutaneous, Once, 1 of 1 cycle  Administration: 6 mg (12/30/2019)  pegfilgrastim (NEULASTA ONPRO) subcutaneous injection kit 6 mg, 6 mg, Subcutaneous, Once, 6 of 6 cycles  Administration: 6 mg (12/6/2019), 6 mg (12/27/2019), 6 mg (1/17/2020), 6 mg (2/7/2020), 6 mg (2/28/2020), 6 mg (3/20/2020)  fosaprepitant (EMEND) 150 mg in sodium chloride 0.9 % 255 mL IVPB, 150 mg, Intravenous, Once, 6 of 6 cycles  Administration: 150 mg (12/6/2019), 150 mg (12/27/2019), 150 mg (1/17/2020), 150 mg (2/7/2020), 150 mg (2/28/2020), 150 mg (3/20/2020)  CARBOplatin (PARAPLATIN) 616.8 mg in sodium chloride 0.9 % 250 mL IVPB, 616.8 mg, Intravenous, Once, 6 of 6 cycles  Administration: 616.8 mg (12/6/2019), 616.8 mg (12/27/2019), 616.8 mg (1/17/2020), 616.8 mg (2/7/2020), 616.8 mg (2/28/2020), 616.8 mg (3/20/2020)  DOCEtaxel (TAXOTERE) 106.2 mg in sodium chloride 0.9 % 250 mL chemo infusion, 60 mg/m2 = 106.2 mg (80 % of original dose 75 mg/m2), Intravenous, Once, 6 of 6 cycles  Dose modification: 60 mg/m2 (original dose 75 mg/m2, Cycle 1, Reason: Dose Not Tolerated), 75 mg/m2 (original dose 75 mg/m2, Cycle 2, Reason: Other (Must fill in a comment), Comment: LFT normalized. )  Administration: 106.2 mg (12/6/2019), 132.8 mg (12/27/2019), 132.8 mg (1/17/2020), 132.8 mg (2/7/2020), 132.8 mg (2/28/2020), 132.8 mg (3/20/2020)  trastuzumab (HERCEPTIN) 600 mg in sodium chloride 0.9 % 250 mL chemo infusion, 624 mg, Intravenous, Once, 18 of 18 cycles  Administration: 600 mg (12/6/2019), 450 mg (12/27/2019), 450 mg (4/10/2020), 450 mg (1/17/2020), 450 mg (2/7/2020), 450 mg (2/28/2020), 450 mg (3/20/2020), 450 mg (5/1/2020), 450 mg (5/22/2020), 450 mg (6/12/2020), 450 mg (7/2/2020), 450 mg (7/24/2020), 450 mg (8/14/2020), 450 mg (9/4/2020), 450 mg (9/28/2020), 450 mg (10/16/2020), 450 mg (11/6/2020), 450 mg (11/27/2020)     5/4/2020 - 6/2/2020 Radiation    Plan ID Energy Fractions Dose per Fraction (cGy) Dose Correction (cGy) Total Dose Delivered (cGy) Elapsed Days   BH L BOOST 6X 5 / 5 200 0 1,000 6   BH L BREAST 6X 16 / 16 36 0 4,256 22     Dr. Jose Ceron       6/2020 -  Hormone Therapy    Anastrozole           Patient ID: Luis Felipe Burgos is a 78 y.o. female  Presenting today for a breast cancer survivorship visit. She has not appreciated any changes on self-exam.  She denies persistent headaches, back pain or bone pain, cough or shortness of breath, abdominal pain. She continues on anastrozole. Review of Systems   Constitutional: Negative for activity change, appetite change, chills, fatigue, fever and unexpected weight change. Respiratory: Negative for cough and shortness of breath. Cardiovascular: Negative for chest pain. Gastrointestinal: Negative for abdominal pain, constipation, diarrhea, nausea and vomiting. Musculoskeletal: Negative for arthralgias, back pain, gait problem and myalgias. Skin: Negative for color change and rash. Neurological: Negative for dizziness and headaches. Hematological: Negative for adenopathy. Psychiatric/Behavioral: Negative for agitation and confusion. All other systems reviewed and are negative.       Objective:    not currently breastfeeding. There is no height or weight on file to calculate BMI. Physical Exam  Vitals reviewed. Constitutional:       General: She is not in acute distress. Appearance: Normal appearance. She is well-developed. She is not diaphoretic. HENT:      Head: Normocephalic and atraumatic. Cardiovascular:      Rate and Rhythm: Normal rate and regular rhythm. Heart sounds: Normal heart sounds. Pulmonary:      Effort: Pulmonary effort is normal.      Breath sounds: Normal breath sounds. Chest:   Breasts:     Right: Skin change (fungal rash beneath breast) present. No swelling, bleeding, inverted nipple, mass, nipple discharge or tenderness. Left: Skin change (surgical scars. fungal rash beneath breast) present. No swelling, bleeding, inverted nipple, mass, nipple discharge or tenderness. Comments: Small amount of telangectasia left inferior breast    Recommended antifungal powder to rash beneath bilateral breasts     Abdominal:      Palpations: Abdomen is soft. There is no mass. Tenderness: There is no abdominal tenderness. Musculoskeletal:         General: Normal range of motion. Cervical back: Normal range of motion. Lymphadenopathy:      Upper Body:      Right upper body: No supraclavicular or axillary adenopathy. Left upper body: No supraclavicular or axillary adenopathy. Skin:     General: Skin is warm and dry. Findings: No rash. Neurological:      Mental Status: She is alert and oriented to person, place, and time.    Psychiatric:         Speech: Speech normal.         Discussed symptoms related to disease recurrence, Yes    Evaluated for late effects related to cancer treatment, Yes, describe: discussed effects of surgery, chemo, targeted therapy, AI thearpy    Screening current for cervical cancer, Yes, describe: n/a    Screening current for colon cancer, Yes, describe: cologaurd 2022, repeat 2025    Cancer rehabilitation services addressed, Yes, describe: patient declines at this time    Screening current for osteoporosis, No  Patient will discuss with her PCP at upcoming visit    Oncology Treatment Summary reviewed with patient and copy provided, Yes    Referral placed for psychosocial evaluation/screening to oncology social work  Yes    I have spent 35 minutes with Patient  today in which greater than 50% of this time was spent in counseling/coordination of care regarding breast cancer survivorship.

## 2023-10-03 NOTE — PROGRESS NOTES
OSW received referral for survivorship. Patient saw ROZ Lopez on 10/3/23. OSW will outreach for assessment and DT.

## 2023-10-09 ENCOUNTER — TELEPHONE (OUTPATIENT)
Dept: HEMATOLOGY ONCOLOGY | Facility: CLINIC | Age: 68
End: 2023-10-09

## 2023-11-01 ENCOUNTER — TELEPHONE (OUTPATIENT)
Dept: HEMATOLOGY ONCOLOGY | Facility: CLINIC | Age: 68
End: 2023-11-01

## 2023-11-01 DIAGNOSIS — T45.1X5A CHEMOTHERAPY INDUCED NEUTROPENIA: ICD-10-CM

## 2023-11-01 DIAGNOSIS — E78.2 MIXED HYPERLIPIDEMIA: ICD-10-CM

## 2023-11-01 DIAGNOSIS — C50.512 MALIGNANT NEOPLASM OF LOWER-OUTER QUADRANT OF LEFT BREAST OF FEMALE, ESTROGEN RECEPTOR POSITIVE: ICD-10-CM

## 2023-11-01 DIAGNOSIS — D70.1 CHEMOTHERAPY INDUCED NEUTROPENIA: ICD-10-CM

## 2023-11-01 DIAGNOSIS — I10 ESSENTIAL HYPERTENSION: ICD-10-CM

## 2023-11-01 DIAGNOSIS — I10 PRIMARY HYPERTENSION: ICD-10-CM

## 2023-11-01 DIAGNOSIS — Z17.0 MALIGNANT NEOPLASM OF LOWER-OUTER QUADRANT OF LEFT BREAST OF FEMALE, ESTROGEN RECEPTOR POSITIVE: ICD-10-CM

## 2023-11-01 RX ORDER — EZETIMIBE 10 MG/1
10 TABLET ORAL DAILY
Qty: 90 TABLET | Refills: 0 | Status: SHIPPED | OUTPATIENT
Start: 2023-11-01 | End: 2024-04-29

## 2023-11-01 RX ORDER — OLMESARTAN MEDOXOMIL AND HYDROCHLOROTHIAZIDE 40/12.5 40; 12.5 MG/1; MG/1
1 TABLET ORAL DAILY
Qty: 90 TABLET | Refills: 1 | Status: SHIPPED | OUTPATIENT
Start: 2023-11-01 | End: 2024-04-29

## 2023-11-01 RX ORDER — ANASTROZOLE 1 MG/1
1 TABLET ORAL DAILY
Qty: 90 TABLET | Refills: 3 | Status: SHIPPED | OUTPATIENT
Start: 2023-11-01

## 2023-11-01 NOTE — TELEPHONE ENCOUNTER
Medication Refill Request   Who are you speaking with? Patient   If it is not the patient, are they listed on an active communication consent form? N/A   Which medication is being requested for refill? Please list medication name and dosage Anastrozole 1MG     How many pills does the patient have left?  15 LEFT    Preferred Pharmacy / [facility] [address]    Phone: [phone]   Fax: [phone]    Who is the prescribing provider?  Dr. Loera Breath   Call back number [phone]   Relevant Information  N/A

## 2023-11-06 DIAGNOSIS — C50.512 MALIGNANT NEOPLASM OF LOWER-OUTER QUADRANT OF LEFT BREAST OF FEMALE, ESTROGEN RECEPTOR POSITIVE: Primary | ICD-10-CM

## 2023-11-06 DIAGNOSIS — I10 ESSENTIAL HYPERTENSION: ICD-10-CM

## 2023-11-06 DIAGNOSIS — D70.1 CHEMOTHERAPY INDUCED NEUTROPENIA: ICD-10-CM

## 2023-11-06 DIAGNOSIS — Z17.0 MALIGNANT NEOPLASM OF LOWER-OUTER QUADRANT OF LEFT BREAST OF FEMALE, ESTROGEN RECEPTOR POSITIVE: Primary | ICD-10-CM

## 2023-11-06 DIAGNOSIS — T45.1X5A CHEMOTHERAPY INDUCED NEUTROPENIA: ICD-10-CM

## 2023-11-06 RX ORDER — OLMESARTAN MEDOXOMIL AND HYDROCHLOROTHIAZIDE 40/12.5 40; 12.5 MG/1; MG/1
1 TABLET ORAL DAILY
Qty: 90 TABLET | Refills: 0 | Status: SHIPPED | OUTPATIENT
Start: 2023-11-06 | End: 2024-05-04

## 2023-11-06 RX ORDER — ANASTROZOLE 1 MG/1
1 TABLET ORAL DAILY
Qty: 90 TABLET | Refills: 0 | Status: SHIPPED | OUTPATIENT
Start: 2023-11-06

## 2023-11-06 RX ORDER — OLMESARTAN MEDOXOMIL AND HYDROCHLOROTHIAZIDE 40/12.5 40; 12.5 MG/1; MG/1
1 TABLET ORAL DAILY
Qty: 90 TABLET | Refills: 0 | Status: SHIPPED | OUTPATIENT
Start: 2023-11-06

## 2023-11-20 ENCOUNTER — HOSPITAL ENCOUNTER (OUTPATIENT)
Dept: MAMMOGRAPHY | Facility: CLINIC | Age: 68
Discharge: HOME | End: 2023-11-20
Payer: COMMERCIAL

## 2023-11-20 VITALS — BODY MASS INDEX: 35.73 KG/M2 | HEIGHT: 60 IN | WEIGHT: 182 LBS

## 2023-11-20 DIAGNOSIS — C50.512 MALIGNANT NEOPLASM OF LOWER-OUTER QUADRANT OF LEFT BREAST OF FEMALE, ESTROGEN RECEPTOR POSITIVE: ICD-10-CM

## 2023-11-20 DIAGNOSIS — Z17.0 MALIGNANT NEOPLASM OF LOWER-OUTER QUADRANT OF LEFT BREAST OF FEMALE, ESTROGEN RECEPTOR POSITIVE: ICD-10-CM

## 2023-11-20 PROCEDURE — G0279 TOMOSYNTHESIS, MAMMO: HCPCS

## 2023-11-20 PROCEDURE — 77066 DX MAMMO INCL CAD BI: CPT

## 2023-11-21 ENCOUNTER — TELEPHONE (OUTPATIENT)
Dept: CASE MANAGEMENT | Facility: HOSPITAL | Age: 68
End: 2023-11-21

## 2023-11-21 ENCOUNTER — PATIENT OUTREACH (OUTPATIENT)
Dept: CASE MANAGEMENT | Facility: HOSPITAL | Age: 68
End: 2023-11-21

## 2023-11-29 ENCOUNTER — PATIENT OUTREACH (OUTPATIENT)
Dept: CASE MANAGEMENT | Facility: HOSPITAL | Age: 68
End: 2023-11-29

## 2023-11-29 NOTE — PROGRESS NOTES
Biopsychosocial and Barriers Assessment Survivorship     Home/Cell Phone: [phone]  Emergency Contact: Claudia Brock -spouse  Marital Status:    Interpretation concerns, speaks another language, preferred language: English  Cultural concerns: none  Ability to read or write: yes    Housing: house  Home Setup: 3 levels  Lives With:  spouse  Daily Living Activities: independent   Durable Medical Equipment: none  Ambulation: independent     Preferred Pharmacy: [facility]  Medication coverage: has coverage     Employment: retired in [place] Status/Location: no  Ability to pay bills: yes  POA/LW/AD:Has financial POA but does not have medical or living will. LSW to send 2 copies for her and spouse    Additional Comments:  OSW outreached to patient to complete assessment and DT. Patient self-rated 3/10 on DT. Patient reported that when she was going through treatment, fatigue was really bad but this has since improved. Patient reported that she does have problems with sleeping and has started taking melatonin with relief. Patient reported that she does worry about reoccurrence and stated that it's always in the background. Patient denies current needs.  LSW will send [facility] ACP documents per her request.

## 2024-01-11 ENCOUNTER — APPOINTMENT (OUTPATIENT)
Dept: LAB | Facility: MEDICAL CENTER | Age: 69
End: 2024-01-11
Payer: COMMERCIAL

## 2024-01-11 DIAGNOSIS — I10 PRIMARY HYPERTENSION: ICD-10-CM

## 2024-01-11 DIAGNOSIS — Z00.00 ANNUAL PHYSICAL EXAM: ICD-10-CM

## 2024-01-11 DIAGNOSIS — I42.7 CARDIOMYOPATHY DUE TO DRUG AND EXTERNAL AGENT (HCC): ICD-10-CM

## 2024-01-11 DIAGNOSIS — E78.2 MIXED HYPERLIPIDEMIA: ICD-10-CM

## 2024-01-11 LAB
ALBUMIN SERPL BCP-MCNC: 4.2 G/DL (ref 3.5–5)
ALP SERPL-CCNC: 75 U/L (ref 34–104)
ALT SERPL W P-5'-P-CCNC: 109 U/L (ref 7–52)
ANION GAP SERPL CALCULATED.3IONS-SCNC: 9 MMOL/L
AST SERPL W P-5'-P-CCNC: 65 U/L (ref 13–39)
BILIRUB SERPL-MCNC: 0.66 MG/DL (ref 0.2–1)
BUN SERPL-MCNC: 11 MG/DL (ref 5–25)
CALCIUM SERPL-MCNC: 9.9 MG/DL (ref 8.4–10.2)
CHLORIDE SERPL-SCNC: 98 MMOL/L (ref 96–108)
CHOLEST SERPL-MCNC: 223 MG/DL
CO2 SERPL-SCNC: 29 MMOL/L (ref 21–32)
CREAT SERPL-MCNC: 0.7 MG/DL (ref 0.6–1.3)
GFR SERPL CREATININE-BSD FRML MDRD: 89 ML/MIN/1.73SQ M
GLUCOSE P FAST SERPL-MCNC: 87 MG/DL (ref 65–99)
HDLC SERPL-MCNC: 80 MG/DL
LDLC SERPL CALC-MCNC: 127 MG/DL (ref 0–100)
NONHDLC SERPL-MCNC: 143 MG/DL
POTASSIUM SERPL-SCNC: 4.3 MMOL/L (ref 3.5–5.3)
PROT SERPL-MCNC: 7.7 G/DL (ref 6.4–8.4)
SODIUM SERPL-SCNC: 136 MMOL/L (ref 135–147)
TRIGL SERPL-MCNC: 79 MG/DL

## 2024-01-11 PROCEDURE — 80053 COMPREHEN METABOLIC PANEL: CPT

## 2024-01-11 PROCEDURE — 80061 LIPID PANEL: CPT

## 2024-01-11 PROCEDURE — 36415 COLL VENOUS BLD VENIPUNCTURE: CPT

## 2024-01-24 ENCOUNTER — OFFICE VISIT (OUTPATIENT)
Age: 69
End: 2024-01-24
Payer: COMMERCIAL

## 2024-01-24 VITALS
WEIGHT: 180.8 LBS | HEART RATE: 96 BPM | BODY MASS INDEX: 35.5 KG/M2 | DIASTOLIC BLOOD PRESSURE: 90 MMHG | OXYGEN SATURATION: 98 % | SYSTOLIC BLOOD PRESSURE: 150 MMHG | HEIGHT: 60 IN

## 2024-01-24 DIAGNOSIS — E78.2 MIXED HYPERLIPIDEMIA: ICD-10-CM

## 2024-01-24 DIAGNOSIS — I42.7 CARDIOMYOPATHY DUE TO DRUG AND EXTERNAL AGENT (HCC): Primary | ICD-10-CM

## 2024-01-24 DIAGNOSIS — Z92.21 STATUS POST ADMINISTRATION OF CARDIOTOXIC CHEMOTHERAPY: ICD-10-CM

## 2024-01-24 DIAGNOSIS — I10 PRIMARY HYPERTENSION: ICD-10-CM

## 2024-01-24 DIAGNOSIS — E66.01 OBESITY, MORBID (HCC): ICD-10-CM

## 2024-01-24 DIAGNOSIS — R74.8 ELEVATED LIVER ENZYMES: ICD-10-CM

## 2024-01-24 PROCEDURE — 99204 OFFICE O/P NEW MOD 45 MIN: CPT | Performed by: INTERNAL MEDICINE

## 2024-01-24 PROCEDURE — 93000 ELECTROCARDIOGRAM COMPLETE: CPT | Performed by: INTERNAL MEDICINE

## 2024-01-24 RX ORDER — AMLODIPINE BESYLATE 5 MG/1
5 TABLET ORAL DAILY
Qty: 90 TABLET | Refills: 1 | Status: SHIPPED | OUTPATIENT
Start: 2024-01-24

## 2024-01-24 NOTE — PATIENT INSTRUCTIONS
Check echocardiogram - to check heart function  Start new BP medication - amlodipine - 1 pill daily

## 2024-01-24 NOTE — PROGRESS NOTES
Cardio-Oncology / Heart Failure Cardiology Clinic Note    Parul Kelley 68 y.o. female   MRN: [id number]  Encounter: [id number]        Assessment / Plan:    # Cardio-Oncology Pertinent History  Breast cancer  Dx 2019.  Left sided.   Prior chemotherapy -  TCH then herceptin monotherapy  Prior XRT  Current:  anastrozole    # Cardio-Oncology Survivorship Recommendations  Discussed that many cancer survivors are at increased risk of cardiovascular disease due to cancer treatments as well as comorbidities.  Discussed importance of optimizing cardiovascular risk factors and post-treatment cardiac surveillance when appropriate.    Left sided breast radiation, HER2 therapy    Annual history and physical  BP management - SEE BELOW  Lipids - SEE BELOW  Exercise -  doing cardio exercise  Healthy diet -  discussed.  Does a lot of plant based diet.   Routine dental care - discussed  Post-treatment surveillance:   complete    # Cardiomyopathy - mild and likely due to chemotherapy  Pre HER2 therapy EF - 65%  On HER2 therapy EF dropped to 50%  Last echo - EF improved to 55%  Exam - euvolemic  Repeat echo to ensure EF remains normal    # HTN  Olmesartan-HCTZ - 40 / 12.5 mg daily  BP today elevated.  Reports white coat HTN component but still slightly above goal at home  Add norvasc 5 mg daily    # HLD  Lipids 1/2024:   TG normal.  .  Didn't tolerate statin due to rise in LFT's.  Would not retry unless cleared by GI.  On zetia  On vascepa (icosapent ethyl) - recently started by PCP.   Could ultimately consider coronary calcium scan to stratify risk    # Pre-diabetes  Last A1c - 6.1  F/u with PCP    # Elevated AST / ALT  Remains elevated despite stopping the statin.   CT scan in 2019 showed fatty liver - possible explanation here.  Start with PCP follow up.   Rec weight loss and cut back on ETOH.   Offerred GI consult, defers for now.     # obesity  BMI 35  Weight loss recommended      Today's Plan Summary:  See above  assessment/plan for full details of today's plan.  Briefly,     Echo  Start norvasc 5mg QD            Reason For Visit / Chief Complaint:  Referred by Moe Blanco NP for a new patient visit for cardio-oncology evaluation in the setting of mildly depressed EF on HER2 therapy in the past.    HPI:   Parul Kelley is a 68 y.o.  female with history as noted in the problem list and further detailed in the above assessment and plan.    Referred by Moe Blanco NP for a new patient visit for cardio-oncology evaluation in the setting of mildly depressed EF on HER2 therapy in the past.    As above, the patient has a history of breast cancer diagnosed in 2019.  Left-sided.  Had chemotherapy including HER2 agents.  Also had radiation.  Currently on anastrozole.  Surveillance Echo's on HER2 therapy did show a drop in EF down to 50% in 2020.  The most recent echo later that year showed recovery of EF to 55%.  The patient also has hypertension, hyperlipidemia, prediabetes.    Today, the patient reports - reports feeling well.   No CP or pressure.  Some fatigue.   No SOB or KELLER.  No orthopnea or PND.  No edema.   No palpitations or syncope.     Retired.  Prior  at [employer] - also worked at [employer] in [place].   .  No children.  Former smoker (quit 20 years ago).  1 glass of wine per day.   No drugs.      Cardiac Imaging personally reviewed:  EKG 1-24-24  Normal sinus rhythm  LAE  Nonspecific T wave changes.       Holter or event monitor    Echo 11/2019  EF 65%    3/2020  EF 60%    7/2020  EF 50%    11/2020  EF 55%       TAMARA    Cardiac MRI    Stress testing    Coronary CTA or [facility]    CPET              Patient Active Problem List    Diagnosis Date Noted   • Obesity, morbid (HCC) 08/21/2023   • 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 (HCC) 07/11/2022   • Use of anastrozole 11/30/2020   • Hypertension 06/22/2020   • Hyperlipidemia 06/22/2020   • Port-A-Cath in place 12/20/2019   •  Chemotherapy induced neutropenia  2019   • Malignant neoplasm of lower-outer quadrant of left breast of female, estrogen receptor positive  10/01/2019       Past Medical History:   Diagnosis Date   • Breast cancer (HCC) 10/2019    grade 3 ER Positive [place] negative Her 2 3+ disease left   • History of chemotherapy 2020   • History of radiation therapy 2020   • Hyperlipidemia    • Hypertension    • Vertigo     no medication       Review of Systems   Constitutional:  Negative for chills and fever.   HENT:  Negative for nosebleeds.    Gastrointestinal:  Negative for abdominal distention and blood in stool.   Neurological:  Negative for dizziness and syncope.   Psychiatric/Behavioral:  Negative for confusion.    14-point ROS completed and negative except as stated above and/or in the HPI.    Allergies   Allergen Reactions   • Adhesive [Medical Tape]        Current Outpatient Medications   Medication Instructions   • amLODIPine (NORVASC) 5 mg, Oral, Daily   • anastrozole (ARIMIDEX) 1 mg, Oral, Daily   • ezetimibe (ZETIA) 10 mg, Oral, Daily   • Icosapent Ethyl (Vascepa) 0.5 g CAPS 2 capsules, Oral, 2 times daily   • Multiple Vitamins-Minerals (CENTRUM SILVER 50+WOMEN PO) 1 tablet, Oral, Daily (early morning)   • olmesartan-hydrochlorothiazide (BENICAR HCT) 40-12.5 MG per tablet 1 tablet, Oral, Daily       Social History     Socioeconomic History   • Marital status: /Civil Union     Spouse name: Not on file   • Number of children: Not on file   • Years of education: Not on file   • Highest education level: Not on file   Occupational History   • Not on file   Tobacco Use   • Smoking status: Former     Current packs/day: 0.00     Average packs/day: 0.8 packs/day for 38.0 years (28.5 ttl pk-yrs)     Types: Cigarettes     Start date:      Quit date: 2010     Years since quittin.0   • Smokeless tobacco: Never   Vaping Use   • Vaping status: Never Used   Substance and Sexual Activity   • Alcohol use: Yes    • Drug use: Never   • Sexual activity: Yes     Partners: Female     Birth control/protection: None, Post-menopausal   Other Topics Concern   • Not on file   Social History Narrative   • Not on file     Social Determinants of Health     Financial Resource Strain: Not on file   Food Insecurity: Not on file   Transportation Needs: Not on file   Physical Activity: Not on file   Stress: Not on file   Social Connections: Not on file   Intimate Partner Violence: Not on file   Housing Stability: Not on file       Family History   Problem Relation Age of Onset   • No Known Problems Mother    • Prostate cancer Father 70   • Arthritis Father    • Hypertension Father    • No Known Problems Sister    • No Known Problems Maternal Grandmother    • No Known Problems Maternal Grandfather    • No Known Problems Paternal Grandmother    • No Known Problems Paternal Grandfather    • No Known Problems Paternal Aunt    • Nail disease Paternal Aunt    • No Known Problems Paternal Aunt        Physical Exam:  Blood pressure 150/90, pulse 96, height 5' (1.524 m), weight 82 kg (180 lb 12.8 oz), SpO2 98%, not currently breastfeeding.  Body mass index is 35.31 kg/m².  Wt Readings from Last 3 Encounters:   01/24/24 82 kg (180 lb 12.8 oz)   11/20/23 82.6 kg (182 lb)   10/03/23 82.8 kg (182 lb 9.6 oz)     Physical Exam  Vitals reviewed.   Constitutional:       General: She is not in acute distress.     Appearance: She is not toxic-appearing.   HENT:      Head: Normocephalic and atraumatic.   Eyes:      General: No scleral icterus.     Conjunctiva/sclera: Conjunctivae normal.   Neck:      Vascular: No carotid bruit.      Comments: No JVP elevation  Cardiovascular:      Rate and Rhythm: Normal rate and regular rhythm.      Heart sounds: No murmur heard.     No friction rub. No gallop.   Pulmonary:      Breath sounds: Normal breath sounds. No wheezing, rhonchi or rales.   Abdominal:      General: There is no distension.      Palpations: Abdomen is  "soft.      Tenderness: There is no abdominal tenderness. There is no guarding.   Musculoskeletal:      Right lower leg: No edema.      Left lower leg: No edema.   Skin:     Coloration: Skin is not jaundiced or pale.      Findings: No erythema.   Neurological:      Mental Status: She is alert. Mental status is at baseline.   Psychiatric:         Mood and Affect: Mood normal.         Behavior: Behavior normal.         Labs & Results:  Lab Results   Component Value Date    SODIUM 136 01/11/2024    K 4.3 01/11/2024    CL 98 01/11/2024    CO2 29 01/11/2024    BUN 11 01/11/2024    CREATININE 0.70 01/11/2024    GLUC 104 03/20/2020    CALCIUM 9.9 01/11/2024     No results found for: \"NTBNP\"       Thank you for the opportunity to participate in the care of this patient.    Robert Balbuena MD, [facility]  Staff Cardiologist  Director of Cardio-Oncology  [facility]  "

## 2024-02-13 DIAGNOSIS — I10 PRIMARY HYPERTENSION: ICD-10-CM

## 2024-02-13 DIAGNOSIS — T45.1X5A CHEMOTHERAPY INDUCED NEUTROPENIA: ICD-10-CM

## 2024-02-13 DIAGNOSIS — C50.512 MALIGNANT NEOPLASM OF LOWER-OUTER QUADRANT OF LEFT BREAST OF FEMALE, ESTROGEN RECEPTOR POSITIVE: ICD-10-CM

## 2024-02-13 DIAGNOSIS — Z17.0 MALIGNANT NEOPLASM OF LOWER-OUTER QUADRANT OF LEFT BREAST OF FEMALE, ESTROGEN RECEPTOR POSITIVE: ICD-10-CM

## 2024-02-13 DIAGNOSIS — D70.1 CHEMOTHERAPY INDUCED NEUTROPENIA: ICD-10-CM

## 2024-02-13 DIAGNOSIS — E78.2 MIXED HYPERLIPIDEMIA: ICD-10-CM

## 2024-02-13 RX ORDER — EZETIMIBE 10 MG/1
10 TABLET ORAL DAILY
Qty: 90 TABLET | Refills: 1 | Status: SHIPPED | OUTPATIENT
Start: 2024-02-13 | End: 2024-08-11

## 2024-02-13 RX ORDER — ANASTROZOLE 1 MG/1
1 TABLET ORAL DAILY
Qty: 90 TABLET | Refills: 3 | Status: SHIPPED | OUTPATIENT
Start: 2024-02-13

## 2024-03-21 ENCOUNTER — HOSPITAL ENCOUNTER (OUTPATIENT)
Dept: NON INVASIVE DIAGNOSTICS | Facility: HOSPITAL | Age: 69
Discharge: HOME | End: 2024-03-21
Attending: INTERNAL MEDICINE
Payer: COMMERCIAL

## 2024-03-21 VITALS
HEIGHT: 60 IN | DIASTOLIC BLOOD PRESSURE: 90 MMHG | HEART RATE: 95 BPM | BODY MASS INDEX: 35.49 KG/M2 | WEIGHT: 180.78 LBS | SYSTOLIC BLOOD PRESSURE: 150 MMHG

## 2024-03-21 DIAGNOSIS — I42.7 CARDIOMYOPATHY DUE TO DRUG AND EXTERNAL AGENT (HCC): ICD-10-CM

## 2024-03-21 DIAGNOSIS — Z92.21 STATUS POST ADMINISTRATION OF CARDIOTOXIC CHEMOTHERAPY: ICD-10-CM

## 2024-03-21 LAB
AORTIC ROOT: 2.5 CM
APICAL FOUR CHAMBER EJECTION FRACTION: 65 %
ASCENDING AORTA: 2.8 CM
BSA FOR ECHO PROCEDURE: 1.79 M2
E WAVE DECELERATION TIME: 150 MS
E/A RATIO: 0.74
FRACTIONAL SHORTENING: 37 (ref 28–44)
GLOBAL LONGITUIDAL STRAIN: -14 %
INTERVENTRICULAR SEPTUM IN DIASTOLE (PARASTERNAL SHORT AXIS VIEW): 1.1 CM
INTERVENTRICULAR SEPTUM: 1.1 CM (ref 0.6–1.1)
LAAS-AP2: 12.3 CM2
LAAS-AP4: 11.9 CM2
LEFT ATRIUM SIZE: 3.4 CM
LEFT ATRIUM VOLUME (MOD BIPLANE): 27 ML
LEFT ATRIUM VOLUME INDEX (MOD BIPLANE): 15.1 ML/M2
LEFT INTERNAL DIMENSION IN SYSTOLE: 2.6 CM (ref 2.1–4)
LEFT VENTRICLE DIASTOLIC VOLUME (MOD BIPLANE): 33 ML
LEFT VENTRICLE DIASTOLIC VOLUME INDEX (MOD BIPLANE): 18.4 ML/M2
LEFT VENTRICLE SYSTOLIC VOLUME (MOD BIPLANE): 12 ML
LEFT VENTRICLE SYSTOLIC VOLUME INDEX (MOD BIPLANE): 6.7 ML/M2
LEFT VENTRICULAR INTERNAL DIMENSION IN DIASTOLE: 4.1 CM (ref 3.5–6)
LEFT VENTRICULAR POSTERIOR WALL IN END DIASTOLE: 1 CM
LEFT VENTRICULAR STROKE VOLUME: 50 ML
LV EF: 64 %
LVSV (TEICH): 50 ML
MV E'TISSUE VEL-SEP: 7 CM/S
MV PEAK A VEL: 0.78 M/S
MV PEAK E VEL: 58 CM/S
MV STENOSIS PRESSURE HALF TIME: 43 MS
MV VALVE AREA P 1/2 METHOD: 5.1
RIGHT ATRIUM AREA SYSTOLE A4C: 10.5 CM2
RIGHT VENTRICLE ID DIMENSION: 2.4 CM
SL CV LEFT ATRIUM LENGTH A2C: 4.4 CM
SL CV LV EF: 67
SL CV PED ECHO LEFT VENTRICLE DIASTOLIC VOLUME (MOD BIPLANE) 2D: 75 ML
SL CV PED ECHO LEFT VENTRICLE SYSTOLIC VOLUME (MOD BIPLANE) 2D: 25 ML
TRICUSPID ANNULAR PLANE SYSTOLIC EXCURSION: 1.8 CM

## 2024-03-21 PROCEDURE — 93306 TTE W/DOPPLER COMPLETE: CPT | Performed by: INTERNAL MEDICINE

## 2024-03-21 PROCEDURE — 93356 MYOCRD STRAIN IMG SPCKL TRCK: CPT | Performed by: INTERNAL MEDICINE

## 2024-03-21 PROCEDURE — 93356 MYOCRD STRAIN IMG SPCKL TRCK: CPT

## 2024-03-21 PROCEDURE — 93306 TTE W/DOPPLER COMPLETE: CPT

## 2024-03-21 NOTE — RESULT ENCOUNTER NOTE
Echo - 03/21/24   Mild LVH.  EF 67%.   GLS reduced at -14% (falsely low due to poor tracking of the lateral wall).   Normal RV size and function but increased wall thickness.   No significant valvular disease.  The inferior vena cava appears small.

## 2024-03-22 ENCOUNTER — OFFICE VISIT (OUTPATIENT)
Dept: CARDIOLOGY CLINIC | Facility: CLINIC | Age: 69
End: 2024-03-22
Payer: COMMERCIAL

## 2024-03-22 VITALS
HEART RATE: 102 BPM | BODY MASS INDEX: 35.3 KG/M2 | DIASTOLIC BLOOD PRESSURE: 94 MMHG | WEIGHT: 179.8 LBS | SYSTOLIC BLOOD PRESSURE: 154 MMHG | HEIGHT: 60 IN | OXYGEN SATURATION: 97 %

## 2024-03-22 DIAGNOSIS — I42.7 CARDIOMYOPATHY DUE TO DRUG AND EXTERNAL AGENT (HCC): ICD-10-CM

## 2024-03-22 DIAGNOSIS — E66.01 OBESITY, MORBID (HCC): ICD-10-CM

## 2024-03-22 DIAGNOSIS — E78.2 MIXED HYPERLIPIDEMIA: ICD-10-CM

## 2024-03-22 DIAGNOSIS — R74.8 ELEVATED LIVER ENZYMES: ICD-10-CM

## 2024-03-22 DIAGNOSIS — I10 ESSENTIAL HYPERTENSION: Primary | ICD-10-CM

## 2024-03-22 DIAGNOSIS — Z92.21 STATUS POST ADMINISTRATION OF CARDIOTOXIC CHEMOTHERAPY: ICD-10-CM

## 2024-03-22 PROCEDURE — 99214 OFFICE O/P EST MOD 30 MIN: CPT | Performed by: INTERNAL MEDICINE

## 2024-03-22 RX ORDER — OLMESARTAN MEDOXOMIL AND HYDROCHLOROTHIAZIDE 40/12.5 40; 12.5 MG/1; MG/1
1 TABLET ORAL DAILY
Qty: 90 TABLET | Refills: 3 | Status: SHIPPED | OUTPATIENT
Start: 2024-03-22

## 2024-03-22 NOTE — PROGRESS NOTES
Cardio-Oncology / Heart Failure Cardiology Clinic Note    Parul Kelley 68 y.o. female   MRN: [id number]  Encounter: [id number]        Assessment / Plan:    # Cardio-Oncology Pertinent History  Breast cancer  Dx 2019.  Left sided.   Prior chemotherapy -  TCH then herceptin monotherapy  Prior XRT  Current:  anastrozole    # Cardio-Oncology Survivorship Recommendations  Discussed that many cancer survivors are at increased risk of cardiovascular disease due to cancer treatments as well as comorbidities.  Discussed importance of optimizing cardiovascular risk factors and post-treatment cardiac surveillance when appropriate.    Left sided breast radiation, HER2 therapy    Annual history and physical  BP management - SEE BELOW  Lipids - SEE BELOW  Exercise -  doing exercise  Healthy diet -  discussed.  Does a lot of plant based diet.   Post-treatment surveillance:   complete    # Cardiomyopathy - mild and likely due to chemotherapy  Pre-HER2 EF - 65%.  On HER2 therapy EF dropped to 50%.  EF has since normalized  Exam - euvolemic    # HTN  Olmesartan-HCTZ - 40 / 12.5 mg daily  Norvasc 5mg daily (added last visit)   Today, BP is elevated.  When she started the Norvasc last visit she actually stopped the combination of olmesartan / HCTZ.  Will restart this.    # HLD  Lipids 1/2024:   TG normal.  .  Didn't tolerate statin due to rise in LFT's.  Would not retry unless cleared by GI.  On zetia  On vascepa (icosapent ethyl) - started by PCP.   Could ultimately consider coronary calcium scan to stratify risk    # Pre-diabetes  F/u with PCP    # Elevated AST / ALT  Remains elevated despite stopping the statin.   CT scan in 2019 showed fatty liver - possible explanation here.  Start with PCP follow up.   Rec weight loss and cut back on ETOH.   Offerred GI consult, defers for now.     # obesity  BMI 35  Weight loss recommended      Today's Plan Summary:  See above assessment/plan for full details of today's plan.  Briefly,      Today, blood pressure is elevated.  When she started the Norvasc last visit she actually stopped the combination of olmesartan / HCTZ.  Will restart this.                   Reason For Visit / Chief Complaint:  F/u  - mildly depressed EF on HER2 therapy in the past.    HPI:   Parul Kelley is a 68 y.o.  female with history as noted in the problem list and further detailed in the above assessment and plan.    Initial:  January 2024  Referred by Moe Blanco NP for evaluation in the setting of mildly depressed EF on HER2 therapy in the past.  As above, the patient has a history of breast cancer diagnosed in 2019.  Had chemotherapy including HER2 agents.  Also had radiation.  Currently on anastrozole.  Surveillance Echo's on HER2 therapy did show a drop in EF down to 50% in 2020.  The most recent echo later that year showed recovery of EF to 55%.  The patient also has hypertension, hyperlipidemia, prediabetes.  Today, the patient reports - reports feeling well.    Retired.  Prior  at [employer] - also worked at "[employer] in [place].   .  No children.  Former smoker (quit 20 years ago).  1 glass of wine per day.      Interval:  Plan at initial visit -->   Echo   Start norvasc 5mg QD    Echo - 03/21/24   EF 67%.   The inferior vena cava appears small.    Today, blood pressure is elevated.  When she started the Norvasc last visit she actually stopped the combination of olmesartan / HCTZ.   Feeling well.  Doing gym every day.  Lost a few lbs.   Eating very healthy.  No CP or SOB.  No edema.             Cardiac Imaging personally reviewed:  EKG 1-24-24  Normal sinus rhythm  LAE  Nonspecific T wave changes.       Holter or event monitor    Echo 11/2019  EF 65%    3/2020  EF 60%    7/2020  EF 50%    11/2020  EF 55%    Echo - 03/21/24   Mild LVH.  EF 67%.   GLS reduced at -14% (falsely low due to poor tracking of the lateral wall).   Normal RV size and function but increased wall thickness.   No  significant valvular disease.  The inferior vena cava appears small.           TAMARA    Cardiac MRI    Stress testing    Coronary CTA or [facility]    CPET              Patient Active Problem List    Diagnosis Date Noted   • Obesity, morbid (HCC) 2023   • 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 (HCC) 2022   • Use of anastrozole 2020   • Hypertension 2020   • Hyperlipidemia 2020   • Port-A-Cath in place 2019   • Chemotherapy induced neutropenia  2019   • Malignant neoplasm of lower-outer quadrant of left breast of female, estrogen receptor positive  10/01/2019       Past Medical History:   Diagnosis Date   • Breast cancer (HCC) 10/2019    grade 3 ER Positive [place] negative Her 2 3+ disease left   • History of chemotherapy 2020   • History of radiation therapy 2020   • Hyperlipidemia    • Hypertension    • Vertigo     no medication         Allergies   Allergen Reactions   • Adhesive [Medical Tape]        Current Outpatient Medications   Medication Instructions   • amLODIPine (NORVASC) 5 mg, Oral, Daily   • anastrozole (ARIMIDEX) 1 mg, Oral, Daily   • ezetimibe (ZETIA) 10 mg, Oral, Daily   • Icosapent Ethyl (Vascepa) 0.5 g CAPS 2 capsules, Oral, 2 times daily   • Multiple Vitamins-Minerals (CENTRUM SILVER 50+WOMEN PO) 1 tablet, Oral, Daily (early morning)   • olmesartan-hydrochlorothiazide (BENICAR HCT) 40-12.5 MG per tablet 1 tablet, Oral, Daily       Social History     Socioeconomic History   • Marital status: /Civil Union     Spouse name: Not on file   • Number of children: Not on file   • Years of education: Not on file   • Highest education level: Not on file   Occupational History   • Not on file   Tobacco Use   • Smoking status: Former     Current packs/day: 0.00     Average packs/day: 0.8 packs/day for 38.0 years (28.5 ttl pk-yrs)     Types: Cigarettes     Start date:      Quit date: 2010     Years since quittin.2   • Smokeless tobacco: Never    Vaping Use   • Vaping status: Never Used   Substance and Sexual Activity   • Alcohol use: Yes   • Drug use: Never   • Sexual activity: Yes     Partners: Female     Birth control/protection: None, Post-menopausal   Other Topics Concern   • Not on file   Social History Narrative   • Not on file     Social Determinants of Health     Financial Resource Strain: Not on file   Food Insecurity: Not on file   Transportation Needs: Not on file   Physical Activity: Not on file   Stress: Not on file   Social Connections: Not on file   Intimate Partner Violence: Not on file   Housing Stability: Not on file       Family History   Problem Relation Age of Onset   • No Known Problems Mother    • Prostate cancer Father 70   • Arthritis Father    • Hypertension Father    • No Known Problems Sister    • No Known Problems Maternal Grandmother    • No Known Problems Maternal Grandfather    • No Known Problems Paternal Grandmother    • No Known Problems Paternal Grandfather    • No Known Problems Paternal Aunt    • Nail disease Paternal Aunt    • No Known Problems Paternal Aunt        Physical Exam:  Blood pressure 154/94, pulse 102, height 5' (1.524 m), weight 81.6 kg (179 lb 12.8 oz), SpO2 97%, not currently breastfeeding.  Body mass index is 35.11 kg/m².  Wt Readings from Last 3 Encounters:   03/22/24 81.6 kg (179 lb 12.8 oz)   03/21/24 82 kg (180 lb 12.4 oz)   01/24/24 82 kg (180 lb 12.8 oz)     Physical Exam  Vitals reviewed.   Constitutional:       General: She is not in acute distress.     Appearance: She is not toxic-appearing.   Neck:      Comments: No JVD  Cardiovascular:      Rate and Rhythm: Normal rate and regular rhythm.      Heart sounds: No murmur heard.     No friction rub. No gallop.   Pulmonary:      Breath sounds: Normal breath sounds. No wheezing, rhonchi or rales.   Musculoskeletal:      Right lower leg: No edema.      Left lower leg: No edema.   Neurological:      Mental Status: She is alert.         Labs &  "Results:  Lab Results   Component Value Date    SODIUM 136 01/11/2024    K 4.3 01/11/2024    CL 98 01/11/2024    CO2 29 01/11/2024    BUN 11 01/11/2024    CREATININE 0.70 01/11/2024    GLUC 104 03/20/2020    CALCIUM 9.9 01/11/2024     No results found for: \"NTBNP\"       Thank you for the opportunity to participate in the care of this patient.    Robert Balbuena MD, [facility]  Staff Cardiologist  Director of Cardio-Oncology  [facility]  " Within functional limits Decreased mastication ability

## 2024-04-01 ENCOUNTER — TELEPHONE (OUTPATIENT)
Dept: SURGICAL ONCOLOGY | Facility: CLINIC | Age: 69
End: 2024-04-01

## 2024-04-11 DIAGNOSIS — I10 PRIMARY HYPERTENSION: ICD-10-CM

## 2024-04-12 RX ORDER — AMLODIPINE BESYLATE 5 MG/1
5 TABLET ORAL DAILY
Qty: 90 TABLET | Refills: 1 | Status: SHIPPED | OUTPATIENT
Start: 2024-04-12

## 2024-04-22 ENCOUNTER — OFFICE VISIT (OUTPATIENT)
Dept: SURGICAL ONCOLOGY | Facility: CLINIC | Age: 69
End: 2024-04-22
Payer: COMMERCIAL

## 2024-04-22 VITALS
OXYGEN SATURATION: 94 % | HEIGHT: 60 IN | HEART RATE: 97 BPM | TEMPERATURE: 97.2 F | WEIGHT: 178 LBS | DIASTOLIC BLOOD PRESSURE: 78 MMHG | SYSTOLIC BLOOD PRESSURE: 120 MMHG | BODY MASS INDEX: 34.95 KG/M2

## 2024-04-22 DIAGNOSIS — Z79.811 USE OF ANASTROZOLE: ICD-10-CM

## 2024-04-22 DIAGNOSIS — C50.512 MALIGNANT NEOPLASM OF LOWER-OUTER QUADRANT OF LEFT BREAST OF FEMALE, ESTROGEN RECEPTOR POSITIVE (HCC): Primary | ICD-10-CM

## 2024-04-22 DIAGNOSIS — Z17.0 MALIGNANT NEOPLASM OF LOWER-OUTER QUADRANT OF LEFT BREAST OF FEMALE, ESTROGEN RECEPTOR POSITIVE (HCC): Primary | ICD-10-CM

## 2024-04-22 PROCEDURE — 99213 OFFICE O/P EST LOW 20 MIN: CPT | Performed by: NURSE PRACTITIONER

## 2024-04-22 NOTE — PROGRESS NOTES
Surgical Oncology Follow Up       [address]  [facility] SURGICAL [facility]COLOGY [place]  [address]  [facility] [address]    Parul Kelley  1955  [id number]  [address]  [facility]COL[facility] [place]  [address]  [place] [address]    Chief Complaint   Patient presents with    Follow-up       Assessment/Plan:  1. Malignant neoplasm of lower-outer quadrant of left breast of female, estrogen receptor positive (HCC)  - 6 mo f/u visit  - Mammo diagnostic bilateral w 3d & cad; Future    2. Use of anastrozole  - Continue use per medical oncology      Discussion/Summary: Patient is a 68-year-old female that was diagnosed with a left-sided breast cancer in September 2019. Her pathology revealed invasive ductal carcinoma, ER 70%, NM negative, HER2 positive.  She underwent genetic testing which was negative.  She underwent a left lumpectomy and sentinel node biopsy by Dr. Padgett.  She completed adjuvant chemotherapy with TCH, Herceptin monotherapy and adjuvant RT. She is currently taking anastrozole.  She had a bilateral mammogram in November 2023 which was BI-RADS 2, category 2 density.  She offers no new complaints today and there are no worrisome findings on today's clinical exam.  Prescription given for mammogram due later this year.  I will plan to see her back in 6 months or sooner should the need arise.  She was instructed to contact me with any changes or concerns in the interim.  All of her questions were answered today.    History of Present Illness:     Oncology History   Malignant neoplasm of lower-outer quadrant of left breast of female, estrogen receptor positive (HCC)   9/17/2019 Biopsy    Left breast ultrasound-guided biospy  Invasive ductal carcinoma   Grade 1  ER 70%  NM < 1%  HER2 3+     10/10/2019 Genetic Testing    The following genes were evaluated: MOI, BRCA1, BRCA2, CDH1, CHEK2, PALB2, PTEN, STK11, TP53  Negative result. No pathogenic  sequence variants or deletions/dupllications identified  [employer]     10/29/2019 Surgery    Left breast lumpectomy with sentinel lymph node biopsy  Margins negative  0/1 Lymph Nodes  Anatomic/Prognostic Stage IIA    Dr. Padgett     12/6/2019 - 12/17/2020 Chemotherapy    pegfilgrastim (NEULASTA) subcutaneous injection 6 mg, 6 mg, Subcutaneous, Once, 1 of 1 cycle  Administration: 6 mg (12/30/2019)  pegfilgrastim (NEULASTA ONPRO) subcutaneous injection kit 6 mg, 6 mg, Subcutaneous, Once, 6 of 6 cycles  Administration: 6 mg (12/6/2019), 6 mg (12/27/2019), 6 mg (1/17/2020), 6 mg (2/7/2020), 6 mg (2/28/2020), 6 mg (3/20/2020)  fosaprepitant (EMEND) 150 mg in sodium chloride 0.9 % 255 mL IVPB, 150 mg, Intravenous, Once, 6 of 6 cycles  Administration: 150 mg (12/6/2019), 150 mg (12/27/2019), 150 mg (1/17/2020), 150 mg (2/7/2020), 150 mg (2/28/2020), 150 mg (3/20/2020)  CARBOplatin (PARAPLATIN) 616.8 mg in sodium chloride 0.9 % 250 mL IVPB, 616.8 mg, Intravenous, Once, 6 of 6 cycles  Administration: 616.8 mg (12/6/2019), 616.8 mg (12/27/2019), 616.8 mg (1/17/2020), 616.8 mg (2/7/2020), 616.8 mg (2/28/2020), 616.8 mg (3/20/2020)  DOCEtaxel (TAXOTERE) 106.2 mg in sodium chloride 0.9 % 250 mL chemo infusion, 60 mg/m2 = 106.2 mg (80 % of original dose 75 mg/m2), Intravenous, Once, 6 of 6 cycles  Dose modification: 60 mg/m2 (original dose 75 mg/m2, Cycle 1, Reason: Dose Not Tolerated), 75 mg/m2 (original dose 75 mg/m2, Cycle 2, Reason: Other (Must fill in a comment), Comment: LFT normalized. )  Administration: 106.2 mg (12/6/2019), 132.8 mg (12/27/2019), 132.8 mg (1/17/2020), 132.8 mg (2/7/2020), 132.8 mg (2/28/2020), 132.8 mg (3/20/2020)  trastuzumab (HERCEPTIN) 600 mg in sodium chloride 0.9 % 250 mL chemo infusion, 624 mg, Intravenous, Once, 18 of 18 cycles  Administration: 600 mg (12/6/2019), 450 mg (12/27/2019), 450 mg (4/10/2020), 450 mg (1/17/2020), 450 mg (2/7/2020), 450 mg (2/28/2020), 450 mg (3/20/2020), 450 mg (5/1/2020),  450 mg (5/22/2020), 450 mg (6/12/2020), 450 mg (7/2/2020), 450 mg (7/24/2020), 450 mg (8/14/2020), 450 mg (9/4/2020), 450 mg (9/28/2020), 450 mg (10/16/2020), 450 mg (11/6/2020), 450 mg (11/27/2020)     5/4/2020 - 6/2/2020 Radiation    Plan ID Energy Fractions Dose per Fraction (cGy) Dose Correction (cGy) Total Dose Delivered (cGy) Elapsed Days   BH L BOOST 6X 5 / 5 200 0 1,000 6   BH L BREAST 6X 16 / 16 266 0 4,256 22     Dr. Sharp       6/2020 -  Hormone Therapy    Anastrozole          -Interval History: Patient presents today for follow-up visit.  She has not appreciated any changes on self-exam.  She continues on anastrozole.  She denies persistent headaches, back pain or bone pain, cough or shortness of breath, abdominal pain.  She is following with cardio oncology.    Review of Systems:  Review of Systems   Constitutional:  Negative for activity change, appetite change, chills, fatigue, fever and unexpected weight change (reports 4 lb weight loss since last visit, cut carbs).   Respiratory:  Negative for cough and shortness of breath.    Cardiovascular:  Negative for chest pain.   Gastrointestinal:  Negative for abdominal pain, constipation, diarrhea, nausea and vomiting.   Musculoskeletal:  Negative for arthralgias, back pain, gait problem and myalgias.   Skin:  Negative for color change and rash.   Neurological:  Negative for dizziness and headaches.   Hematological:  Negative for adenopathy.   Psychiatric/Behavioral:  Negative for agitation and confusion.    All other systems reviewed and are negative.      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)    Obesity, morbid (HCC)     Past Medical History:   Diagnosis Date    Breast  cancer (HCC) 10/2019    grade 3 ER Positive MD negative Her 2 3+ disease left    History of chemotherapy 03/2020    History of radiation therapy 04/2020    Hyperlipidemia     Hypertension     Vertigo     no medication     Past Surgical History:   Procedure Laterality Date    BREAST BIOPSY Left 09/17/2019    Left U/S BX invasive mammary carcinoma    BREAST BIOPSY Right 09/17/2021    benign    BREAST LUMPECTOMY Left 10/29/2019    Procedure: BREAST NEEDLE LOCALIZED LUMPECTOMY (NEEDLE LOC AT 0800);  Surgeon: Aguilar Padgett MD;  Location: [facility];  Service: Surgical Oncology    FRACTURE SURGERY Left     surgery repair- elbow     IR PORT PLACEMENT  12/03/2019    IR PORT REMOVAL  12/29/2020    LYMPH NODE BIOPSY Left 10/29/2019    Procedure: SENTINEL LYMPH NODE BIOPSY; LYMPHATIC MAPPING WITH BLUE DYE AND RADIOACTIVE DYE (INJECT AT 0900 BY DR PADGETT IN THE OR);  Surgeon: Aguilar Padgett MD;  Location: [facility];  Service: Surgical Oncology    MAMMO NEEDLE LOCALIZATION LEFT (ALL INC) Left 10/29/2019    MAMMO STEREOTACTIC BREAST BIOPSY RIGHT (ALL INC) Right 09/17/2019    US GUIDANCE BREAST BIOPSY LEFT EACH ADDITIONAL Left 09/17/2019    US GUIDED BREAST BIOPSY LEFT COMPLETE Left 09/17/2019    WISDOM TOOTH EXTRACTION Bilateral      Family History   Problem Relation Age of Onset    No Known Problems Mother     Prostate cancer Father 70    Arthritis Father     Hypertension Father     No Known Problems Sister     No Known Problems Maternal Grandmother     No Known Problems Maternal Grandfather     No Known Problems Paternal Grandmother     No Known Problems Paternal Grandfather     No Known Problems Paternal Aunt     Nail disease Paternal Aunt     No Known Problems Paternal Aunt      Social History     Socioeconomic History    Marital status: /Civil Union     Spouse name: Not on file    Number of children: Not on file    Years of education: Not on file    Highest education level: Not on file   Occupational History    Not on file    Tobacco Use    Smoking status: Former     Current packs/day: 0.00     Average packs/day: 0.8 packs/day for 38.0 years (28.5 ttl pk-yrs)     Types: Cigarettes     Start date:      Quit date:      Years since quittin.3    Smokeless tobacco: Never   Vaping Use    Vaping status: Never Used   Substance and Sexual Activity    Alcohol use: Yes    Drug use: Never    Sexual activity: Yes     Partners: Female     Birth control/protection: None, Post-menopausal   Other Topics Concern    Not on file   Social History Narrative    Not on file     Social Determinants of Health     Financial Resource Strain: Not on file   Food Insecurity: Not on file   Transportation Needs: Not on file   Physical Activity: Not on file   Stress: Not on file   Social Connections: Not on file   Intimate Partner Violence: Not on file   Housing Stability: Not on file       Current Outpatient Medications:     amLODIPine (NORVASC) 5 mg tablet, Take 1 tablet (5 mg total) by mouth daily, Disp: 90 tablet, Rfl: 1    anastrozole (ARIMIDEX) 1 mg tablet, Take 1 tablet (1 mg total) by mouth daily, Disp: 90 tablet, Rfl: 3    ezetimibe (ZETIA) 10 mg tablet, Take 1 tablet (10 mg total) by mouth daily, Disp: 90 tablet, Rfl: 1    Icosapent Ethyl (Vascepa) 0.5 g CAPS, Take 2 capsules by mouth 2 (two) times a day, Disp: 360 capsule, Rfl: 3    Multiple Vitamins-Minerals (CENTRUM SILVER 50+WOMEN PO), Take 1 tablet by mouth daily in the early morning , Disp: , Rfl:     olmesartan-hydrochlorothiazide (BENICAR HCT) 40-12.5 MG per tablet, Take 1 tablet by mouth daily, Disp: 90 tablet, Rfl: 3  Allergies   Allergen Reactions    Adhesive [Medical Tape]      Vitals:    24 0927   BP: 120/78   Pulse: 97   Temp: (!) 97.2 °F (36.2 °C)   SpO2: 94%       Physical Exam  Vitals reviewed.   Constitutional:       General: She is not in acute distress.     Appearance: Normal appearance. She is well-developed. She is not diaphoretic.   HENT:      Head: Normocephalic and  atraumatic.   Cardiovascular:      Rate and Rhythm: Normal rate and regular rhythm.      Heart sounds: Normal heart sounds.   Pulmonary:      Effort: Pulmonary effort is normal.      Breath sounds: Normal breath sounds.   Chest:   Breasts:     Right: No swelling, bleeding, inverted nipple, mass, nipple discharge, skin change or tenderness.      Left: Swelling (mild breast edema) and skin change (surgical scars- breast, axilla) present. No bleeding, inverted nipple, mass, nipple discharge or tenderness.      Comments: Small amount telangectasia left inferior breast  Abdominal:      Palpations: Abdomen is soft. There is no mass.      Tenderness: There is no abdominal tenderness.   Musculoskeletal:         General: Normal range of motion.      Cervical back: Normal range of motion.   Lymphadenopathy:      Upper Body:      Right upper body: No supraclavicular or axillary adenopathy.      Left upper body: No supraclavicular or axillary adenopathy.   Skin:     General: Skin is warm and dry.      Findings: No rash.   Neurological:      Mental Status: She is alert and oriented to person, place, and time.   Psychiatric:         Speech: Speech normal.         Advance Care Planning/Advance Directives:  Discussed disease status, cancer treatment plans and/or cancer treatment goals with the patient.

## 2024-05-07 ENCOUNTER — TELEPHONE (OUTPATIENT)
Dept: HEMATOLOGY ONCOLOGY | Facility: CLINIC | Age: 69
End: 2024-05-07

## 2024-05-07 NOTE — TELEPHONE ENCOUNTER
Appointment Change  Cancel, Reschedule, Change to Virtual      Who are you speaking with? Patient   If it is not the patient, is the caller listed on the communication consent form? N/A   Which provider is the appointment scheduled with? Dr. Rowley   When was the original appointment scheduled?    Please list date and time 06/28/2024 @ 8:40AM    At which location is the appointment scheduled to take place? [place]   Was the appointment rescheduled?     Was the appointment changed from an in person visit to a virtual visit?    If so, please list the details of the change. Yes, 07/08/2024 @11:20AM    What is the reason for the appointment change? Provider

## 2024-05-10 DIAGNOSIS — I10 PRIMARY HYPERTENSION: ICD-10-CM

## 2024-05-10 RX ORDER — AMLODIPINE BESYLATE 5 MG/1
5 TABLET ORAL DAILY
Qty: 90 TABLET | Refills: 1 | Status: SHIPPED | OUTPATIENT
Start: 2024-05-10

## 2024-05-17 DIAGNOSIS — D70.1 CHEMOTHERAPY INDUCED NEUTROPENIA (HCC): ICD-10-CM

## 2024-05-17 DIAGNOSIS — Z17.0 MALIGNANT NEOPLASM OF LOWER-OUTER QUADRANT OF LEFT BREAST OF FEMALE, ESTROGEN RECEPTOR POSITIVE (HCC): ICD-10-CM

## 2024-05-17 DIAGNOSIS — C50.512 MALIGNANT NEOPLASM OF LOWER-OUTER QUADRANT OF LEFT BREAST OF FEMALE, ESTROGEN RECEPTOR POSITIVE (HCC): ICD-10-CM

## 2024-05-17 DIAGNOSIS — T45.1X5A CHEMOTHERAPY INDUCED NEUTROPENIA (HCC): ICD-10-CM

## 2024-05-18 RX ORDER — ANASTROZOLE 1 MG/1
1 TABLET ORAL DAILY
Qty: 90 TABLET | Refills: 0 | Status: SHIPPED | OUTPATIENT
Start: 2024-05-18

## 2024-06-26 ENCOUNTER — TELEPHONE (OUTPATIENT)
Dept: DERMATOLOGY | Facility: CLINIC | Age: 69
End: 2024-06-26

## 2024-06-26 NOTE — TELEPHONE ENCOUNTER
Received online scheduling request from [facility]. Called and spoke with patient. Appt has been scheduled for June 2025 with Dr. Street. Patient also placed on cancellation list.

## 2024-06-28 NOTE — PROGRESS NOTES
Hematology/Oncology Outpatient Office Note    Date of Service: 2024    [facility] [place]  [address]  [place] [address]  [phone]    Reason for Consultation:   Chief Complaint   Patient presents with    Follow-up       Cancer Stage at diagnosis: IIA    Referral Physician: No ref. provider found    Primary Care Physician:  Pacheco Fry DO     Nickname: Parul    Spouse: Claudia Mann ECO    Today's ECO    Goals and Barriers:  Current Goal: Minimize effects of disease burden, extend life.   Barriers to accomplishing this: None    Patient's Capacity to Self Care:  Patient is able to self care      ASSESSMENT & PLAN      Diagnosis ICD-10-CM Associated Orders   1. Malignant neoplasm of lower-outer quadrant of left breast of female, estrogen receptor positive (HCC)  C50.512     Z17.0       2. Use of anastrozole  Z79.811             This is a 68 y.o. c PMHx notable for HLP, HTN, vertigo, being seen in consultation for L Localized HR+ Breast cancer on adjuvant endocrine therapy    Pt is s/p lumpectomy, sentinel lymph node biopsy. BRCA gene mutation is negative. S/p 6 cycles TCH, Herceptin. Now on Anastrozole for a 5 year goal.           Discussion of decision making  Oncology history updated, accordingly, during this visit  Goals of care/patient communication  I discussed with the patient the clinical course leading up to their cancer diagnosis. I reviewed relevant office notes, imaging reports and pathology result as well.  I told the patient that this is a case of curable disease and what this means.   I explained the risks/benefits of the proposed cancer therapy: Anastrozole and after discussion including understanding risks of possible life-threatening complications and therapy-related malignancy development, informed consent for blood products and treatment has been verbally obtained.  TNM/Staging At Diagnosis  Cancer Staging    Malignant neoplasm of lower-outer quadrant of left breast of female, estrogen receptor positive (HCC)  Staging form: Breast, AJCC 8th Edition  - Pathologic: Stage IIA (pT2, pN0(sn), cM0, G3, ER+, UT-, HER2+) - Unsigned  Neoadjuvant therapy: No  Method of lymph node assessment: [place] lymph node biopsy  Histologic grading system: 3 grade system  Laterality: Left  Tumor size (mm): 22    Disease Features/Tumor Markers/Genetics  Tumor Marker: n/a  Notable Path Features: noted  Treatment: Anastrozole   Other Supportive care:   Treatment Team Members  Surgeon: Dr. Padgett  Rad Onc  Palliative  Cards: Dr. Balbuena  Labs  Diagnostics        Discussion of decision making    I personally reviewed the following lab results, the image studies, pathology, other specialty/physicians consult notes and recommendations, and outside medical records. I had a lengthy discussion with the patient and shared the work-up findings. We discussed the diagnosis and management plan as below. I spent 42 minutes reviewing the records (labs, clinician notes, outside records, medical history, ordering medicine/tests/procedures, monitoring of anti-neoplastic toxicities, interpreting the imaging/labs previously done) and coordination of care as well as direct time with the patient today, of which greater than 50% of the time was spent in counseling and coordination of care with the patient/family.    Plan/Labs  Cont Anastrozole 1 mg daily until 6/2025 to dejan 5 years, discussed BCI and she'll think about this by next year  DEXA due as no recent one on file  F/u Surg-Onc (goes twice a year for exams), surveillance mammograms (November)        Follow Up: 9 months to discuss BCI further    All questions were answered to the patient's satisfaction during this encounter. The patient knows the contact information for our office and knows to reach out for any relevant concerns related to this encounter. They are to call for any temperature 100.4 or  higher, new symptoms including but not restricted to shaking chills, decreased appetite, nausea, vomiting, diarrhea, increased fatigue, shortness of breath or chest pain, confusion, and not feeling the strength to come to the clinic. For all other listed problems and medical diagnosis in their chart - they are managed by PCP and/or other specialists, which the patient acknowledges. Thank you very much for your consultation and making us a part of this patient's care. We are continuing to follow closely with you. Please do not hesitate to reach out to me with any additional questions or concerns.    Elisabet Rowley MD  Hematology & Medical Oncology Staff Physician             Disclaimer: This document was prepared using [employer] technology. If a word or phrase is confusing, or does not make sense, this is likely due to recognition error which was not discovered during this clinician's review. If you believe an error has occurred, please contact me through [facility] service line for darion?cation.      ONCOLOGY HISTORY OF PRESENT ILLNESS        Oncology History   Malignant neoplasm of lower-outer quadrant of left breast of female, estrogen receptor positive (HCC)   9/17/2019 Biopsy    Left breast ultrasound-guided biospy  Invasive ductal carcinoma   Grade 1  ER 70%  MD < 1%  HER2 3+     10/10/2019 Genetic Testing    The following genes were evaluated: MOI, BRCA1, BRCA2, CDH1, CHEK2, PALB2, PTEN, STK11, TP53  Negative result. No pathogenic sequence variants or deletions/dupllications identified  [employer]     10/29/2019 Surgery    Left breast lumpectomy with sentinel lymph node biopsy  Margins negative  0/1 Lymph Nodes  Anatomic/Prognostic Stage IIA    Dr. Padgett     12/6/2019 - 12/17/2020 Chemotherapy    pegfilgrastim (NEULASTA) subcutaneous injection 6 mg, 6 mg, Subcutaneous, Once, 1 of 1 cycle  Administration: 6 mg (12/30/2019)  pegfilgrastim (NEULASTA ONPRO) subcutaneous injection kit 6 mg, 6 mg,  Subcutaneous, Once, 6 of 6 cycles  Administration: 6 mg (12/6/2019), 6 mg (12/27/2019), 6 mg (1/17/2020), 6 mg (2/7/2020), 6 mg (2/28/2020), 6 mg (3/20/2020)  fosaprepitant (EMEND) 150 mg in sodium chloride 0.9 % 255 mL IVPB, 150 mg, Intravenous, Once, 6 of 6 cycles  Administration: 150 mg (12/6/2019), 150 mg (12/27/2019), 150 mg (1/17/2020), 150 mg (2/7/2020), 150 mg (2/28/2020), 150 mg (3/20/2020)  CARBOplatin (PARAPLATIN) 616.8 mg in sodium chloride 0.9 % 250 mL IVPB, 616.8 mg, Intravenous, Once, 6 of 6 cycles  Administration: 616.8 mg (12/6/2019), 616.8 mg (12/27/2019), 616.8 mg (1/17/2020), 616.8 mg (2/7/2020), 616.8 mg (2/28/2020), 616.8 mg (3/20/2020)  DOCEtaxel (TAXOTERE) 106.2 mg in sodium chloride 0.9 % 250 mL chemo infusion, 60 mg/m2 = 106.2 mg (80 % of original dose 75 mg/m2), Intravenous, Once, 6 of 6 cycles  Dose modification: 60 mg/m2 (original dose 75 mg/m2, Cycle 1, Reason: Dose Not Tolerated), 75 mg/m2 (original dose 75 mg/m2, Cycle 2, Reason: Other (Must fill in a comment), Comment: LFT normalized. )  Administration: 106.2 mg (12/6/2019), 132.8 mg (12/27/2019), 132.8 mg (1/17/2020), 132.8 mg (2/7/2020), 132.8 mg (2/28/2020), 132.8 mg (3/20/2020)  trastuzumab (HERCEPTIN) 600 mg in sodium chloride 0.9 % 250 mL chemo infusion, 624 mg, Intravenous, Once, 18 of 18 cycles  Administration: 600 mg (12/6/2019), 450 mg (12/27/2019), 450 mg (4/10/2020), 450 mg (1/17/2020), 450 mg (2/7/2020), 450 mg (2/28/2020), 450 mg (3/20/2020), 450 mg (5/1/2020), 450 mg (5/22/2020), 450 mg (6/12/2020), 450 mg (7/2/2020), 450 mg (7/24/2020), 450 mg (8/14/2020), 450 mg (9/4/2020), 450 mg (9/28/2020), 450 mg (10/16/2020), 450 mg (11/6/2020), 450 mg (11/27/2020)     5/4/2020 - 6/2/2020 Radiation    Plan ID Energy Fractions Dose per Fraction (cGy) Dose Correction (cGy) Total Dose Delivered (cGy) Elapsed Days   BH L BOOST 6X 5 / 5 200 0 1,000 6   BH L BREAST 6X 16 / 16 266 0 4,256 22     Dr. Sharp       6/2020 -  Hormone  Therapy    Anastrozole       Previous Hematologic/ Oncologic Treatment:       1. Adjuvant chemotherapy with TCH x6 cycle.  Completed in March 2020.      2. Adjuvant trastuzumab monotherapy , completed in November 2020.      Current Hematologic/ Oncologic Treatment:       1. Adjuvant hormonal therapy with anastrozole since June 2020.     SUBJECTIVE  (INTERVAL HISTORY)      Clotting History None   Bleeding History None   Cancer History L Breast   Family Cancer History Father (prostate)   H/O Blood/Plt Transfusion None   Tobacco/etoh/drug abuse 1 PPD x 30 years, quit mid 40's, no etoh abuse or rec drug use           Occupation Spouse is a [employer], she is retired from [employer]     Pain: mild joints that are normal aches    Night sweats, hot flashes: none    I have reviewed the relevant past medical, surgical, social and family history. I have also reviewed allergies and medications for this patient.    Review of Systems    Baseline weight: 180 lbs    Denies F/C, N/V, SOB, CP, LH, HA, rash, itching, gen weakness, melena, hematuria, hematochezia, falls, diarrhea, or constipation       A 10-point review of system was performed, pertinent positive and negative were detailed as above. Otherwise, the 10-point review of system was negative.      Past Medical History:   Diagnosis Date    Breast cancer (HCC) 10/2019    grade 3 ER Positive MS negative Her 2 3+ disease left    History of chemotherapy 03/2020    History of radiation therapy 04/2020    Hyperlipidemia     Hypertension     Vertigo     no medication       Past Surgical History:   Procedure Laterality Date    BREAST BIOPSY Left 09/17/2019    Left U/S BX invasive mammary carcinoma    BREAST BIOPSY Right 09/17/2021    benign    BREAST LUMPECTOMY Left 10/29/2019    Procedure: BREAST NEEDLE LOCALIZED LUMPECTOMY (NEEDLE LOC AT 0800);  Surgeon: Aguilar Padgett MD;  [facility];  Service: Surgical Oncology    FRACTURE SURGERY Left     surgery repair-  elbow     IR PORT PLACEMENT  2019    IR PORT REMOVAL  2020    LYMPH NODE BIOPSY Left 10/29/2019    Procedure: SENTINEL LYMPH NODE BIOPSY; LYMPHATIC MAPPING WITH BLUE DYE AND RADIOACTIVE DYE (INJECT AT 0900 BY DR PADGETT IN THE OR);  Surgeon: Aguilar Padgett MD;  Location[facility];  Service: Surgical Oncology    MAMMO NEEDLE LOCALIZATION LEFT (ALL INC) Left 10/29/2019    MAMMO STEREOTACTIC BREAST BIOPSY RIGHT (ALL INC) Right 2019    US GUIDANCE BREAST BIOPSY LEFT EACH ADDITIONAL Left 2019    US GUIDED BREAST BIOPSY LEFT COMPLETE Left 2019    WISDOM TOOTH EXTRACTION Bilateral        Family History   Problem Relation Age of Onset    No Known Problems Mother     Prostate cancer Father 70    Arthritis Father     Hypertension Father     No Known Problems Sister     No Known Problems Maternal Grandmother     No Known Problems Maternal Grandfather     No Known Problems Paternal Grandmother     No Known Problems Paternal Grandfather     No Known Problems Paternal Aunt     Nail disease Paternal Aunt     No Known Problems Paternal Aunt        Social History     Socioeconomic History    Marital status: /Civil Union     Spouse name: Not on file    Number of children: Not on file    Years of education: Not on file    Highest education level: Not on file   Occupational History    Not on file   Tobacco Use    Smoking status: Former     Current packs/day: 0.00     Average packs/day: 0.8 packs/day for 38.0 years (28.5 ttl pk-yrs)     Types: Cigarettes     Start date:      Quit date:      Years since quittin.5     Passive exposure: Past    Smokeless tobacco: Never   Vaping Use    Vaping status: Never Used   Substance and Sexual Activity    Alcohol use: Yes    Drug use: Never    Sexual activity: Yes     Partners: Female     Birth control/protection: None, Post-menopausal   Other Topics Concern    Not on file   Social History Narrative    Not on file     Social Determinants of Health      Financial Resource Strain: Not on file   Food Insecurity: Not on file   Transportation Needs: Not on file   Physical Activity: Not on file   Stress: Not on file   Social Connections: Not on file   Intimate Partner Violence: Not on file   Housing Stability: Not on file       Allergies   Allergen Reactions    Adhesive [Medical Tape]        Current Outpatient Medications   Medication Sig Dispense Refill    amLODIPine (NORVASC) 5 mg tablet Take 1 tablet (5 mg total) by mouth daily 90 tablet 1    anastrozole (ARIMIDEX) 1 mg tablet Take 1 tablet (1 mg total) by mouth daily 90 tablet 0    ezetimibe (ZETIA) 10 mg tablet Take 1 tablet (10 mg total) by mouth daily 90 tablet 1    Icosapent Ethyl (Vascepa) 0.5 g CAPS Take 2 capsules by mouth 2 (two) times a day 360 capsule 3    Multiple Vitamins-Minerals (CENTRUM SILVER 50+WOMEN PO) Take 1 tablet by mouth daily in the early morning       olmesartan-hydrochlorothiazide (BENICAR HCT) 40-12.5 MG per tablet Take 1 tablet by mouth daily 90 tablet 3     No current facility-administered medications for this visit.       (Not in a hospital admission)      Objective:     24 Hour Vitals Assessment:     Vitals:    07/08/24 1112   BP: 126/64   Pulse: 105   Resp: 16   Temp: (!) 97.4 °F (36.3 °C)   SpO2: 97%       PHYSICIAN EXAM:    General: Appearance: alert, cooperative, no distress.  HEENT: Normocephalic, atraumatic. No scleral icterus. conjunctivae clear. EOMI.  Chest: No tenderness to palpation. No open wound noted.  Lungs: Clear to auscultation bilaterally, Respirations unlabored.  Cardiac: Tachycardia, +S1and S2  Abdomen: Soft, non-tender, non-distended. Bowel sounds are normal.  Extremities:  No edema, cyanosis, clubbing.  Skin: Skin color, turgor are normal. No rashes.  Lymphatics: no palpable supra-cervical, axillary, or inguinal adenopathy  Neurologic: Awake, Alert, and oriented, no gross focal deficits noted b/l.       DATA REVIEW:    Pathology Result:    Final Diagnosis    Date Value Ref Range Status   08/07/2020   Final    A.  Cervix, location not specified #1, biopsy:  - Endocervical polyp/fibroepithelial cervical polyp.    B.  Cervix, location not specified #2, biopsy:  - Endocervical polyp with nabothian cysts.     Clinical correlation is recommended to address whether the lesion has been completely removed and/or sufficiently sampled to assure that the maximum pathologic findings have been determined.    Interpretation performed at [facility], [address] [address]     10/29/2019   Final    A. Left breast, wire-guided & oriented lumpectomy:  - Invasive mammary carcinoma of no special type (ductal, not otherwise specified) - see Note and synoptic report.   * Dixon histologic grade 3 of 3 (total score: 3+3+3 = 9)    -- glandular (acinar) tubular differentiation < 10%, score 3     -- nuclear pleomorphism grade 3 of 3, score 3    -- mitotic rate up to 11/mm2, score 3.   * invasive carcinoma is co-located with prior biopsy site and is multifocal, with main tumor measuring up to 22 millimeters maximal diameter (slides A10-1, A11-1, A12-1 & A13-1) and three satellite tumors, each between 1 & 2 millimeters diameter (A7-1, A8-1, & A25-1).   * unremarkable skin is present and appears uninvolved by invasive carcinoma; no skeletal muscle seen.   * invasive carcinoma is focally present at a cauterized, blue-inked inferior margin; all other lumpectomy margins are uninvolved by invasive carcinoma.  - Ductal carcinoma in-situ (DCIS): present, minor component (<25% of total tumor).    * architectural patterns: solid & cribriform   * nuclear grade: grade 2 of 3   * necrosis: central comedo-type necrosis is present.   * all lumpectomy margins appear uninvolved by DCIS.  - Lymphovascular invasion: not identified.  - Microcalcifications: absent.  - Additional pathologic findings:  non-proliferative fibrocystic changes, without atypia, including columnar cell change.    B.  Left breast inferior margin blue:  - No invasive or in situ carcinoma seen - specimen is benign fibroadipose tissue; no breast glandular tissue seen.  - Final margins are checked and appear uninvolved by neoplasia.    C. Left breast lateral margin red:  - No invasive or in situ carcinoma seen - specimen is benign fatty breast tissue.  - Final margins are checked and appear uninvolved by neoplasia.    D. Left breast medial margin yellow:  - No invasive or in situ carcinoma seen - specimen is benign fatty breast tissue.  - Final margins are checked and appear uninvolved by neoplasia.    E. Left breast posterior margin:  - No invasive or in situ carcinoma seen - specimen is benign fatty breast tissue.  - Final margins are checked and appear uninvolved by neoplasia.    F. Left breast superior margin orange:  - No invasive or in situ carcinoma seen - specimen is benign proliferative fibrocystic changes, without atypia, including usual ductal hyperplasia.  - Intraductal microcalcifications are present in fibrocystic change.  - Final margins are checked and appear uninvolved by neoplasia.    G. Lymph node sentinel, left breast:  - One lymph node is indefinite for a minute group of individual tumor cells (~32 cells, < 0.2 millimeters) in subcapsular sinus, pN0(sn) - see Note.     09/17/2019   Final    A. Left breast, 5:00, 7 cm from nipple, ultrasound-guided large core (12 G) needle biopsy x 3:  - Invasive mammary carcinoma of no special type (ductal, not otherwise specified).   * Dixon histologic grade 1 of 3 (total score: 2+2+1 = 5)    -- glandular (acinar) tubular differentiation ~ 15%, score 2     -- nuclear pleomorphism 2 of 3, score 2    -- mitotic rate < 3/mm2, score 1.   * confirmed by tumor cell immunophenotype:    -- positive: E-cadherin, P120 - each in a cytoplasmic membrane-restricted pattern.    -- negative: p63, calponin-B, [facility].   * invasive carcinoma involves 3 of 3 submitted core biopsies, maximal  dimension = 14 millimeters.   * estrogen, progesterone & HER2 receptor studies pending, to be described in an addendum report.  - Ductal carcinoma in-situ (DCIS): present, minor component (<25% of total tumor).    * architectural patterns: cribriform   * nuclear grade: grade 2 of 3   * necrosis: central comedo-type necrosis is present.  - Lymphovascular invasion: not identified.  - Microcalcifications: absent.  - Best representative tumor block: A1  - Sufficient tumor present for:   * [place] Mammaprint/Blueprint (1 cm2 of invasive tumor in aggregate): Yes   * MI Profile/Foundation One (at least 5 x 5 mm of tumor): Yes.    Note:  Intradepartmental consultation concurs with invasive ductal carcinoma.  Final report is telephonically discussed with Ms. BRUNO Loya, [facility] @ 10:05 AM, 9/19/2019.    B. Left breast, 1:00 5 cm from nipple, ultrasound-guided large core needle biopsy x 2:  - Benign, proliferative fibrocystic changes, without atypia, including usual ductal hyperplasia adjacent to and involving an intraductal micropapilloma; no calcifications seen.  - No evidence of malignancy.    C. Right breast without calcifications, large core needle biopsy x 5:  - Benign, proliferative fibrocystic changes, without atypia, including usual ductal hyperplasia; no calcifications seen.  - No evidence of malignancy.     09/17/2019   Final    A. Left breast, 5:00, 7 cm from nipple, ultrasound-guided large core (12 G) needle biopsy x 3:  - Invasive mammary carcinoma of no special type (ductal, not otherwise specified).   * [place] histologic grade 1 of 3 (total score: 2+2+1 = 5)    -- glandular (acinar) tubular differentiation ~ 15%, score 2     -- nuclear pleomorphism 2 of 3, score 2    -- mitotic rate < 3/mm2, score 1.   * confirmed by tumor cell immunophenotype:    -- positive: E-cadherin, P120 - each in a cytoplasmic membrane-restricted pattern.    -- negative: p63, calponin-B, [facility].   * invasive carcinoma  involves 3 of 3 submitted core biopsies, maximal dimension = 14 millimeters.   * estrogen, progesterone & HER2 receptor studies pending, to be described in an addendum report.  - Ductal carcinoma in-situ (DCIS): present, minor component (<25% of total tumor).    * architectural patterns: cribriform   * nuclear grade: grade 2 of 3   * necrosis: central comedo-type necrosis is present.  - Lymphovascular invasion: not identified.  - Microcalcifications: absent.  - Best representative tumor block: A1  - Sufficient tumor present for:   * [place]ia Mammaprint/Blueprint (1 cm2 of invasive tumor in aggregate): Yes   * MI Profile/Foundation One (at least 5 x 5 mm of tumor): Yes.    Note:  Intradepartmental consultation concurs with invasive ductal carcinoma.  Final report is telephonically discussed with Ms. BRUNO Loya, [facility] @ 10:05 AM, 9/19/2019.    B. Left breast, 1:00 5 cm from nipple, ultrasound-guided large core needle biopsy x 2:  - Benign, proliferative fibrocystic changes, without atypia, including usual ductal hyperplasia adjacent to and involving an intraductal micropapilloma; no calcifications seen.  - No evidence of malignancy.    C. Right breast without calcifications, large core needle biopsy x 5:  - Benign, proliferative fibrocystic changes, without atypia, including usual ductal hyperplasia; no calcifications seen.  - No evidence of malignancy.          Image Results:   Image result are reviewed and documented in Hematology/Oncology history    Echo complete w/ contrast if indicated    Left Ventricle: Left ventricular cavity size is normal. Wall thickness   is mildly increased. The left ventricular ejection fraction is 67% by   biplane measurement. Systolic function is normal. Global longitudinal   strain is reduced at -14%, but may be falsely low due to poor tracking of   the lateral wall. Wall motion is normal. Diastolic function is normal.    Right Ventricle: Right ventricular cavity size  "is normal. Systolic   function is normal. Wall thickness is mildly increased.    No hemodynamically significant valvular disease.    IVC/SVC: The inferior vena cava appears small.    Prior echo 11/3/2020.  On direct comparison no significant changes.    Strain was performed to quantify interventricular dyssynchrony and   evaluate components of myocardial function due to Chemotherapy. Results   from the utilization of Strain Analysis are listed in the report below.      LABS:  Lab data are reviewed and documented in HemOn history.       Lab Results   Component Value Date    HGB 13.9 08/05/2022    HCT 42.4 08/05/2022    MCV 99 (H) 08/05/2022     08/05/2022    WBC 5.53 08/05/2022    NRBC 0 06/14/2021     Lab Results   Component Value Date    K 4.3 01/11/2024    CL 98 01/11/2024    CO2 29 01/11/2024    BUN 11 01/11/2024    CREATININE 0.70 01/11/2024    GLUF 87 01/11/2024    CALCIUM 9.9 01/11/2024    AST 65 (H) 01/11/2024     (H) 01/11/2024    ALKPHOS 75 01/11/2024    EGFR 89 01/11/2024       No results found for: \"IRON\", \"TIBC\", \"FERRITIN\"    No results found for: \"[id number]\"    No results for input(s): \"WBC\", \"CREAT\", \"PLT\" in the last 72 hours.    By:  Elisabet Rowley MD, 7/8/2024, 11:26 AM                                  "

## 2024-07-08 ENCOUNTER — OFFICE VISIT (OUTPATIENT)
Dept: HEMATOLOGY ONCOLOGY | Facility: CLINIC | Age: 69
End: 2024-07-08
Payer: COMMERCIAL

## 2024-07-08 VITALS
HEIGHT: 60 IN | WEIGHT: 180 LBS | OXYGEN SATURATION: 97 % | DIASTOLIC BLOOD PRESSURE: 64 MMHG | RESPIRATION RATE: 16 BRPM | TEMPERATURE: 97.4 F | HEART RATE: 105 BPM | SYSTOLIC BLOOD PRESSURE: 126 MMHG | BODY MASS INDEX: 35.34 KG/M2

## 2024-07-08 DIAGNOSIS — Z79.811 USE OF ANASTROZOLE: ICD-10-CM

## 2024-07-08 DIAGNOSIS — C50.512 MALIGNANT NEOPLASM OF LOWER-OUTER QUADRANT OF LEFT BREAST OF FEMALE, ESTROGEN RECEPTOR POSITIVE (HCC): Primary | ICD-10-CM

## 2024-07-08 DIAGNOSIS — T45.1X5A CHEMOTHERAPY INDUCED NEUTROPENIA (HCC): ICD-10-CM

## 2024-07-08 DIAGNOSIS — D70.1 CHEMOTHERAPY INDUCED NEUTROPENIA (HCC): ICD-10-CM

## 2024-07-08 DIAGNOSIS — Z17.0 MALIGNANT NEOPLASM OF LOWER-OUTER QUADRANT OF LEFT BREAST OF FEMALE, ESTROGEN RECEPTOR POSITIVE (HCC): Primary | ICD-10-CM

## 2024-07-08 PROCEDURE — 99215 OFFICE O/P EST HI 40 MIN: CPT | Performed by: INTERNAL MEDICINE

## 2024-07-08 RX ORDER — ANASTROZOLE 1 MG/1
1 TABLET ORAL DAILY
Qty: 90 TABLET | Refills: 3 | Status: SHIPPED | OUTPATIENT
Start: 2024-07-08

## 2024-08-06 ENCOUNTER — APPOINTMENT (OUTPATIENT)
Dept: LAB | Facility: MEDICAL CENTER | Age: 69
End: 2024-08-06

## 2024-08-06 DIAGNOSIS — Z00.8 HEALTH EXAMINATION IN POPULATION SURVEY: ICD-10-CM

## 2024-08-06 LAB
CHOLEST SERPL-MCNC: 211 MG/DL
EST. AVERAGE GLUCOSE BLD GHB EST-MCNC: 123 MG/DL
HBA1C MFR BLD: 5.9 %
HDLC SERPL-MCNC: 82 MG/DL
LDLC SERPL CALC-MCNC: 108 MG/DL (ref 0–100)
NONHDLC SERPL-MCNC: 129 MG/DL
TRIGL SERPL-MCNC: 104 MG/DL

## 2024-08-06 PROCEDURE — 83036 HEMOGLOBIN GLYCOSYLATED A1C: CPT

## 2024-08-06 PROCEDURE — 80061 LIPID PANEL: CPT

## 2024-08-06 PROCEDURE — 36415 COLL VENOUS BLD VENIPUNCTURE: CPT

## 2024-08-27 DIAGNOSIS — E78.2 MIXED HYPERLIPIDEMIA: ICD-10-CM

## 2024-08-27 DIAGNOSIS — I10 PRIMARY HYPERTENSION: ICD-10-CM

## 2024-08-28 RX ORDER — EZETIMIBE 10 MG/1
10 TABLET ORAL DAILY
Qty: 90 TABLET | Refills: 0 | Status: SHIPPED | OUTPATIENT
Start: 2024-08-28

## 2024-08-31 ENCOUNTER — OFFICE VISIT (OUTPATIENT)
Dept: URGENT CARE | Facility: MEDICAL CENTER | Age: 69
End: 2024-08-31
Payer: COMMERCIAL

## 2024-08-31 VITALS
SYSTOLIC BLOOD PRESSURE: 144 MMHG | HEART RATE: 99 BPM | OXYGEN SATURATION: 99 % | TEMPERATURE: 98.4 F | RESPIRATION RATE: 18 BRPM | DIASTOLIC BLOOD PRESSURE: 67 MMHG

## 2024-08-31 DIAGNOSIS — H61.21 IMPACTED CERUMEN, RIGHT EAR: Primary | ICD-10-CM

## 2024-08-31 PROCEDURE — 69209 REMOVE IMPACTED EAR WAX UNI: CPT | Performed by: PHYSICIAN ASSISTANT

## 2024-08-31 PROCEDURE — 99213 OFFICE O/P EST LOW 20 MIN: CPT | Performed by: PHYSICIAN ASSISTANT

## 2024-08-31 NOTE — PROGRESS NOTES
[facility]        NAME: Parul Kelley is a 68 y.o. female  : 1955    MRN: [id number]  DATE: 2024  TIME: 10:33 AM    Assessment and Plan   No primary diagnosis found.  No diagnosis found.      Patient Instructions       Follow up with PCP in 3-5 days.  Proceed to  ER if symptoms worsen.    If tests have been performed at [facility], our office will contact you with results if changes need to be made to the care plan discussed with you at the visit.  [facility]    Chief Complaint     Chief Complaint   Patient presents with    Cerumen Impaction     Patient c/o Cerumen Impaction of the right ear x 1 day          History of Present Illness       Patient with complaints of difficulty hearing right ear over the last few days.  States something is blocking her right ear.  She did put peroxide in but it seemed to make things worse.  No other complaints.  No drainage.        Review of Systems   Review of Systems   All other systems reviewed and are negative.        Current Medications       Current Outpatient Medications:     amLODIPine (NORVASC) 5 mg tablet, Take 1 tablet (5 mg total) by mouth daily, Disp: 90 tablet, Rfl: 1    anastrozole (ARIMIDEX) 1 mg tablet, Take 1 tablet (1 mg total) by mouth daily, Disp: 90 tablet, Rfl: 3    ezetimibe (ZETIA) 10 mg tablet, Take 1 tablet by mouth daily, Disp: 90 tablet, Rfl: 0    Multiple Vitamins-Minerals (CENTRUM SILVER 50+WOMEN PO), Take 1 tablet by mouth daily in the early morning , Disp: , Rfl:     olmesartan-hydrochlorothiazide (BENICAR HCT) 40-12.5 MG per tablet, Take 1 tablet by mouth daily, Disp: 90 tablet, Rfl: 3    Current Allergies     Allergies as of 2024 - Reviewed 2024   Allergen Reaction Noted    Adhesive [medical tape]  2019            The following portions of the patient's history were reviewed and updated as appropriate: allergies, current medications, past family history, past  medical history, past social history, past surgical history and problem list.     Past Medical History:   Diagnosis Date    Breast cancer (HCC) 10/2019    grade 3 ER Positive NC negative Her 2 3+ disease left    History of chemotherapy 03/2020    History of radiation therapy 04/2020    Hyperlipidemia     Hypertension     Vertigo     no medication       Past Surgical History:   Procedure Laterality Date    BREAST BIOPSY Left 09/17/2019    Left U/S BX invasive mammary carcinoma    BREAST BIOPSY Right 09/17/2021    benign    BREAST LUMPECTOMY Left 10/29/2019    Procedure: BREAST NEEDLE LOCALIZED LUMPECTOMY (NEEDLE LOC AT 0800);  Surgeon: Aguilar Padgett MD;  Location: [facility];  Service: Surgical Oncology    FRACTURE SURGERY Left     surgery repair- elbow     IR PORT PLACEMENT  12/03/2019    IR PORT REMOVAL  12/29/2020    LYMPH NODE BIOPSY Left 10/29/2019    Procedure: SENTINEL LYMPH NODE BIOPSY; LYMPHATIC MAPPING WITH BLUE DYE AND RADIOACTIVE DYE (INJECT AT 0900 BY DR PADGETT IN THE OR);  Surgeon: Aguilar Padgett MD;  Location: [facility];  Service: Surgical Oncology    MAMMO NEEDLE LOCALIZATION LEFT (ALL INC) Left 10/29/2019    MAMMO STEREOTACTIC BREAST BIOPSY RIGHT (ALL INC) Right 09/17/2019    US GUIDANCE BREAST BIOPSY LEFT EACH ADDITIONAL Left 09/17/2019    US GUIDED BREAST BIOPSY LEFT COMPLETE Left 09/17/2019    WISDOM TOOTH EXTRACTION Bilateral        Family History   Problem Relation Age of Onset    No Known Problems Mother     Prostate cancer Father 70    Arthritis Father     Hypertension Father     No Known Problems Sister     No Known Problems Maternal Grandmother     No Known Problems Maternal Grandfather     No Known Problems Paternal Grandmother     No Known Problems Paternal Grandfather     No Known Problems Paternal Aunt     Nail disease Paternal Aunt     No Known Problems Paternal Aunt          Medications have been verified.        Objective   /67   Pulse 99   Temp 98.4 °F (36.9 °C)   Resp 18   LMP   "(LMP Unknown)   SpO2 99%   No LMP recorded (lmp unknown). Patient is postmenopausal.       Physical Exam     Physical Exam  Vitals and nursing note reviewed.   Constitutional:       Appearance: Normal appearance. She is normal weight.   HENT:      Right Ear: Tympanic membrane and external ear normal.   Eyes:      Conjunctiva/sclera: Conjunctivae normal.   Cardiovascular:      Rate and Rhythm: Normal rate and regular rhythm.      Pulses: Normal pulses.      Heart sounds: Normal heart sounds.   Pulmonary:      Effort: Pulmonary effort is normal.      Breath sounds: Normal breath sounds.   Neurological:      Mental Status: She is alert.   Psychiatric:         Mood and Affect: Mood normal.         Behavior: Behavior normal.     Ear cerumen removal    Date/Time: 8/31/2024 9:30 AM    Performed by: Wilberto Sorto PA-C  Authorized by: Wilberto Sorto PA-C  Universal Protocol:  procedure performed by consultantConsent: Verbal consent obtained.  Risks and benefits: risks, benefits and alternatives were discussed  Consent given by: patient  Time out: Immediately prior to procedure a \"time out\" was called to verify the correct patient, procedure, equipment, support staff and site/side marked as required.  Timeout called at: 8/31/2024 10:33 AM.  Patient understanding: patient states understanding of the procedure being performed  Patient consent: the patient's understanding of the procedure matches consent given  Patient identity confirmed: verbally with patient    Patient location:  Clinic  Indications / Diagnosis:  Impacted cerumen  Procedure details:     Location:  R ear    Procedure type: irrigation only      Approach:  External  Post-procedure details:     Complication:  None    Hearing quality:  Normal    Patient tolerance of procedure:  Tolerated well, no immediate complications                  " Home

## 2024-09-20 ENCOUNTER — HOSPITAL ENCOUNTER (OUTPATIENT)
Dept: BONE DENSITY | Facility: MEDICAL CENTER | Age: 69
Discharge: HOME | End: 2024-09-20
Payer: COMMERCIAL

## 2024-09-20 DIAGNOSIS — Z79.811 USE OF ANASTROZOLE: ICD-10-CM

## 2024-09-20 PROCEDURE — 77080 DXA BONE DENSITY AXIAL: CPT

## 2024-09-26 ENCOUNTER — OFFICE VISIT (OUTPATIENT)
Age: 69
End: 2024-09-26
Payer: COMMERCIAL

## 2024-09-26 VITALS
BODY MASS INDEX: 34.37 KG/M2 | TEMPERATURE: 98.1 F | DIASTOLIC BLOOD PRESSURE: 80 MMHG | OXYGEN SATURATION: 95 % | HEART RATE: 105 BPM | WEIGHT: 176 LBS | SYSTOLIC BLOOD PRESSURE: 120 MMHG

## 2024-09-26 DIAGNOSIS — I10 PRIMARY HYPERTENSION: ICD-10-CM

## 2024-09-26 DIAGNOSIS — Z17.0 MALIGNANT NEOPLASM OF LOWER-OUTER QUADRANT OF LEFT BREAST OF FEMALE, ESTROGEN RECEPTOR POSITIVE (HCC): ICD-10-CM

## 2024-09-26 DIAGNOSIS — D70.1 CHEMOTHERAPY INDUCED NEUTROPENIA (HCC): ICD-10-CM

## 2024-09-26 DIAGNOSIS — I42.7 CARDIOMYOPATHY DUE TO DRUG AND EXTERNAL AGENT (HCC): Primary | ICD-10-CM

## 2024-09-26 DIAGNOSIS — Z00.00 ANNUAL PHYSICAL EXAM: ICD-10-CM

## 2024-09-26 DIAGNOSIS — T45.1X5A CHEMOTHERAPY INDUCED NEUTROPENIA (HCC): ICD-10-CM

## 2024-09-26 DIAGNOSIS — C50.512 MALIGNANT NEOPLASM OF LOWER-OUTER QUADRANT OF LEFT BREAST OF FEMALE, ESTROGEN RECEPTOR POSITIVE (HCC): ICD-10-CM

## 2024-09-26 PROBLEM — Z95.828 PORT-A-CATH IN PLACE: Status: RESOLVED | Noted: 2019-12-20 | Resolved: 2024-09-26

## 2024-09-26 PROCEDURE — 99397 PER PM REEVAL EST PAT 65+ YR: CPT | Performed by: FAMILY MEDICINE

## 2024-09-26 NOTE — PROGRESS NOTES
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       Primary hypertension  Continue current therapy.  Follow-up here on regular basis.       Chemotherapy induced neutropenia (HCC)  She is doing better continue current therapy.       Malignant neoplasm of lower-outer quadrant of left breast of female, estrogen receptor positive (HCC)  She does continue to follow-up with oncology.       Annual physical exam  Healthy physical exam today.  Continue healthy lifestyle.         Depression Screening and Follow-up Plan: Patient was screened for depression during today's encounter. They screened negative with a PHQ-2 score of 0.      Preventive health issues were discussed with patient, and age appropriate screening tests were ordered as noted in patient's After Visit Summary. Personalized health advice and appropriate referrals for health education or preventive services given if needed, as noted in patient's After Visit Summary.    History of Present Illness     Patient presents with:  Physical Exam: Annual PE  PT is due for lung cancer screening    Parul is doing well and feeling well.  She does follow-up with cardiology.  Her labs have been good her A1c was 5.9.  Her lipids were all very good.    This is a 68 y.o. c PMHx notable for HLP, HTN, vertigo, being seen in consultation for L Localized HR+ Breast cancer on adjuvant endocrine therapy     Pt is s/p lumpectomy, sentinel lymph node biopsy. BRCA gene mutation is negative. S/p 6 cycles TCH, Herceptin. Now on Anastrozole for a 5 year goal.         Patient Care Team:  Pacheco Fry DO as PCP - General (Family Medicine)  Franchesca Cruz RN as Nurse Navigator (Oncology)  Aguilar Padgett MD as  Surgeon (Surgical Oncology)  Tonya Guevara MD (Obstetrics and Gynecology)  Tamir Sharp MD (Radiation Oncology)  Dionicio Aguilar MD as Medical Oncologist (Hematology)  ROZ Canales (Oncology)  Elisabet Rowley MD (Hematology and Oncology)    Review of Systems   Constitutional: Negative.    HENT: Negative.     Eyes: Negative.    Respiratory: Negative.     Cardiovascular: Negative.    Gastrointestinal: Negative.    Endocrine: Negative.    Genitourinary: Negative.    Musculoskeletal: Negative.    Skin: Negative.    Allergic/Immunologic: Negative.    Neurological: Negative.    Hematological: Negative.    Psychiatric/Behavioral: Negative.     All other systems reviewed and are negative.    Medical History Reviewed by provider this encounter:       Annual Wellness Visit Questionnaire   Parul is here for her Subsequent Wellness visit.     Health Risk Assessment:   Patient rates overall health as very good. Patient feels that their physical health rating is slightly better. Eyesight was rated as same. Hearing was rated as same.     Depression Screening:   PHQ-2 Score: 0      Fall Risk Screening:   In the past year, patient has experienced: no history of falling in past year      Home Safety:  Patient does not have trouble with stairs inside or outside of their home. Patient has working smoke alarms and has working carbon monoxide detector. Home safety hazards include: none.     Nutrition:   Current diet is Regular.     Medications:   Patient is not currently taking any over-the-counter supplements. Patient is able to manage medications.     Activities of Daily Living (ADLs)/Instrumental Activities of Daily Living (IADLs):   Walk and transfer into and out of bed and chair?: Yes  Dress and groom yourself?: Yes    Bathe or shower yourself?: Yes    Feed yourself? Yes  Do your laundry/housekeeping?: Yes  Manage your money, pay your bills and track your expenses?: Yes  Make your own meals?: Yes    Do  your own shopping?: Yes    Advance Care Planning:   Living will: Yes    Advanced directive: Yes      PREVENTIVE SCREENINGS      Cardiovascular Screening:    General: Screening Not Indicated and History Lipid Disorder      Diabetes Screening:     General: Screening Current      Colorectal Cancer Screening:     General: Screening Current      Prostate Cancer Screening:    General: Risks and Benefits Discussed      Breast Cancer Screening:     General: History Breast Cancer      Cervical Cancer Screening:    General: Screening Not Indicated      Osteoporosis Screening:    General: Risks and Benefits Discussed      Abdominal Aortic Aneurysm (AAA) Screening:    Risk factors include: age between 65-76 yo and tobacco use        General: Risks and Benefits Discussed      Lung Cancer Screening:     General: Risks and Benefits Discussed      Hepatitis C Screening:    General: Screening Current       No results found.    Objective     LMP  (LMP Unknown)     Physical Exam  Vitals and nursing note reviewed.   Constitutional:       Appearance: She is well-developed.   HENT:      Head: Normocephalic.   Eyes:      Pupils: Pupils are equal, round, and reactive to light.   Cardiovascular:      Rate and Rhythm: Normal rate and regular rhythm.      Heart sounds: Normal heart sounds.   Pulmonary:      Effort: Pulmonary effort is normal.      Breath sounds: Normal breath sounds.   Abdominal:      General: Bowel sounds are normal.      Palpations: Abdomen is soft.   Musculoskeletal:         General: Normal range of motion.      Cervical back: Normal range of motion and neck supple.   Skin:     General: Skin is warm and dry.      Capillary Refill: Capillary refill takes less than 2 seconds.   Neurological:      General: No focal deficit present.      Mental Status: She is alert and oriented to person, place, and time.   Psychiatric:         Mood and Affect: Mood normal.         Behavior: Behavior normal.

## 2024-09-26 NOTE — PATIENT INSTRUCTIONS
Medicare Preventive Visit Patient Instructions  Thank you for completing your Welcome to Medicare Visit or Medicare Annual Wellness Visit today. Your next wellness visit will be due in one year (9/27/2025).  The screening/preventive services that you may require over the next 5-10 years are detailed below. Some tests may not apply to you based off risk factors and/or age. Screening tests ordered at today's visit but not completed yet may show as past due. Also, please note that scanned in results may not display below.  Preventive Screenings:  Service Recommendations Previous Testing/Comments   Colorectal Cancer Screening  * Colonoscopy    * Fecal Occult Blood Test (FOBT)/Fecal Immunochemical Test (FIT)  * Fecal DNA/Cologuard Test  * Flexible Sigmoidoscopy Age: 45-75 years old   Colonoscopy: every 10 years (may be performed more frequently if at higher risk)  OR  FOBT/FIT: every 1 year  OR  Cologuard: every 3 years  OR  Sigmoidoscopy: every 5 years  Screening may be recommended earlier than age 45 if at higher risk for colorectal cancer. Also, an individualized decision between you and your healthcare provider will decide whether screening between the ages of 76-85 would be appropriate. Colonoscopy: Not on file  FOBT/FIT: Not on file  Cologuard: 07/18/2022  Sigmoidoscopy: Not on file    Screening Current     Breast Cancer Screening Age: 40+ years old  Frequency: every 1-2 years  Not required if history of left and right mastectomy Mammogram: 11/20/2023    History Breast Cancer   Cervical Cancer Screening Between the ages of 21-29, pap smear recommended once every 3 years.   Between the ages of 30-65, can perform pap smear with HPV co-testing every 5 years.   Recommendations may differ for women with a history of total hysterectomy, cervical cancer, or abnormal pap smears in past. Pap Smear: 11/18/2019    Screening Not Indicated   Hepatitis C Screening Once for adults born between 1945 and 1965  More frequently in  patients at high risk for Hepatitis C Hep C Antibody: 06/14/2021    Screening Current   Diabetes Screening 1-2 times per year if you're at risk for diabetes or have pre-diabetes Fasting glucose: 87 mg/dL (1/11/2024)  A1C: 5.9 % (8/6/2024)  Screening Current   Cholesterol Screening Once every 5 years if you don't have a lipid disorder. May order more often based on risk factors. Lipid panel: 08/06/2024    Screening Not Indicated  History Lipid Disorder     Other Preventive Screenings Covered by Medicare:  Abdominal Aortic Aneurysm (AAA) Screening: covered once if your at risk. You're considered to be at risk if you have a family history of AAA.  Lung Cancer Screening: covers low dose CT scan once per year if you meet all of the following conditions: (1) Age 55-77; (2) No signs or symptoms of lung cancer; (3) Current smoker or have quit smoking within the last 15 years; (4) You have a tobacco smoking history of at least 20 pack years (packs per day multiplied by number of years you smoked); (5) You get a written order from a healthcare provider.  Glaucoma Screening: covered annually if you're considered high risk: (1) You have diabetes OR (2) Family history of glaucoma OR (3)  aged 50 and older OR (4)  American aged 65 and older  Osteoporosis Screening: covered every 2 years if you meet one of the following conditions: (1) You're estrogen deficient and at risk for osteoporosis based off medical history and other findings; (2) Have a vertebral abnormality; (3) On glucocorticoid therapy for more than 3 months; (4) Have primary hyperparathyroidism; (5) On osteoporosis medications and need to assess response to drug therapy.   Last bone density test (DXA Scan): 09/20/2024.  HIV Screening: covered annually if you're between the age of 15-65. Also covered annually if you are younger than 15 and older than 65 with risk factors for HIV infection. For pregnant patients, it is covered up to 3 times per  pregnancy.    Immunizations:  Immunization Recommendations   Influenza Vaccine Annual influenza vaccination during flu season is recommended for all persons aged >= 6 months who do not have contraindications   Pneumococcal Vaccine   * Pneumococcal conjugate vaccine = PCV13 (Prevnar 13), PCV15 (Vaxneuvance), PCV20 (Prevnar 20)  * Pneumococcal polysaccharide vaccine = PPSV23 (Pneumovax) Adults 19-63 yo with certain risk factors or if 65+ yo  If never received any pneumonia vaccine: recommend Prevnar 20 (PCV20)  Give PCV20 if previously received 1 dose of PCV13 or PPSV23   Hepatitis B Vaccine 3 dose series if at intermediate or high risk (ex: diabetes, end stage renal disease, liver disease)   Respiratory syncytial virus (RSV) Vaccine - COVERED BY MEDICARE PART D  * RSVPreF3 (Arexvy) CDC recommends that adults 60 years of age and older may receive a single dose of RSV vaccine using shared clinical decision-making (SCDM)   Tetanus (Td) Vaccine - COST NOT COVERED BY MEDICARE PART B Following completion of primary series, a booster dose should be given every 10 years to maintain immunity against tetanus. Td may also be given as tetanus wound prophylaxis.   Tdap Vaccine - COST NOT COVERED BY MEDICARE PART B Recommended at least once for all adults. For pregnant patients, recommended with each pregnancy.   Shingles Vaccine (Shingrix) - COST NOT COVERED BY MEDICARE PART B  2 shot series recommended in those 19 years and older who have or will have weakened immune systems or those 50 years and older     Health Maintenance Due:      Topic Date Due   • Lung Cancer Screening  10/10/2020   • Breast Cancer Screening: Mammogram  11/20/2024   • Colorectal Cancer Screening  07/18/2025   • Hepatitis C Screening  Completed   • Cervical Cancer Screening  Discontinued     Immunizations Due:      Topic Date Due   • DTaP,Tdap,and Td Vaccines (1 - Tdap) Never done   • Influenza Vaccine (1) 09/01/2024     Advance Directives   What are  advance directives?  Advance directives are legal documents that state your wishes and plans for medical care. These plans are made ahead of time in case you lose your ability to make decisions for yourself. Advance directives can apply to any medical decision, such as the treatments you want, and if you want to donate organs.   What are the types of advance directives?  There are many types of advance directives, and each state has rules about how to use them. You may choose a combination of any of the following:  Living will:  This is a written record of the treatment you want. You can also choose which treatments you do not want, which to limit, and which to stop at a certain time. This includes surgery, medicine, IV fluid, and tube feedings.   Durable power of  for healthcare (DPAHC):  This is a written record that states who you want to make healthcare choices for you when you are unable to make them for yourself. This person, called a proxy, is usually a family member or a friend. You may choose more than 1 proxy.  Do not resuscitate (DNR) order:  A DNR order is used in case your heart stops beating or you stop breathing. It is a request not to have certain forms of treatment, such as CPR. A DNR order may be included in other types of advance directives.  Medical directive:  This covers the care that you want if you are in a coma, near death, or unable to make decisions for yourself. You can list the treatments you want for each condition. Treatment may include pain medicine, surgery, blood transfusions, dialysis, IV or tube feedings, and a ventilator (breathing machine).  Values history:  This document has questions about your views, beliefs, and how you feel and think about life. This information can help others choose the care that you would choose.  Why are advance directives important?  An advance directive helps you control your care. Although spoken wishes may be used, it is better to have your  wishes written down. Spoken wishes can be misunderstood, or not followed. Treatments may be given even if you do not want them. An advance directive may make it easier for your family to make difficult choices about your care.   Weight Management   Why it is important to manage your weight:  Being overweight increases your risk of health conditions such as heart disease, high blood pressure, type 2 diabetes, and certain types of cancer. It can also increase your risk for osteoarthritis, sleep apnea, and other respiratory problems. Aim for a slow, steady weight loss. Even a small amount of weight loss can lower your risk of health problems.  How to lose weight safely:  A safe and healthy way to lose weight is to eat fewer calories and get regular exercise. You can lose up about 1 pound a week by decreasing the number of calories you eat by 500 calories each day.   Healthy meal plan for weight management:  A healthy meal plan includes a variety of foods, contains fewer calories, and helps you stay healthy. A healthy meal plan includes the following:  Eat whole-grain foods more often.  A healthy meal plan should contain fiber. Fiber is the part of grains, fruits, and vegetables that is not broken down by your body. Whole-grain foods are healthy and provide extra fiber in your diet. Some examples of whole-grain foods are whole-wheat breads and pastas, oatmeal, brown rice, and bulgur.  Eat a variety of vegetables every day.  Include dark, leafy greens such as spinach, kale, mehran greens, and mustard greens. Eat yellow and orange vegetables such as carrots, sweet potatoes, and winter squash.   Eat a variety of fruits every day.  Choose fresh or canned fruit (canned in its own juice or light syrup) instead of juice. Fruit juice has very little or no fiber.  Eat low-fat dairy foods.  Drink fat-free (skim) milk or 1% milk. Eat fat-free yogurt and low-fat cottage cheese. Try low-fat cheeses such as mozzarella and other  "reduced-fat cheeses.  Choose meat and other protein foods that are low in fat.  Choose beans or other legumes such as split peas or lentils. Choose fish, skinless poultry (chicken or turkey), or lean cuts of red meat (beef or pork). Before you cook meat or poultry, cut off any visible fat.   Use less fat and oil.  Try baking foods instead of frying them. Add less fat, such as margarine, sour cream, regular salad dressing and mayonnaise to foods. Eat fewer high-fat foods. Some examples of high-fat foods include french fries, doughnuts, ice cream, and cakes.  Eat fewer sweets.  Limit foods and drinks that are high in sugar. This includes candy, cookies, regular soda, and sweetened drinks.  Exercise:  Exercise at least 30 minutes per day on most days of the week. Some examples of exercise include walking, biking, dancing, and swimming. You can also fit in more physical activity by taking the stairs instead of the elevator or parking farther away from stores. Ask your healthcare provider about the best exercise plan for you.      © Copyright [employer] 2018 Information is for End User's use only and may not be sold, redistributed or otherwise used for commercial purposes. All illustrations and images included in CareNotes® are the copyrighted property of [employer] Inc. or [employer]      Patient Education     Routine physical for adults   The Basics   Written by the doctors and editors at [facility]   What is a physical? -- A physical is a routine visit, or \"check-up,\" with your doctor. You might also hear it called a \"wellness visit\" or \"preventive visit.\"  During each visit, the doctor will:   Ask about your physical and mental health   Ask about your habits, behaviors, and lifestyle   Do an exam   Give you vaccines if needed   Talk to you about any medicines you take   Give advice about your health   Answer your questions  Getting regular check-ups is an important part of taking care of your health. It can " "help your doctor find and treat any problems you have. But it's also important for preventing health problems.  A routine physical is different from a \"sick visit.\" A sick visit is when you see a doctor because of a health concern or problem. Since physicals are scheduled ahead of time, you can think about what you want to ask the doctor.  How often should I get a physical? -- It depends on your age and health. In general, for people age 21 years and older:   If you are younger than 50 years, you might be able to get a physical every 3 years.   If you are 50 years or older, your doctor might recommend a physical every year.  If you have an ongoing health condition, like diabetes or high blood pressure, your doctor will probably want to see you more often.  What happens during a physical? -- In general, each visit will include:   Physical exam - The doctor or nurse will check your height, weight, heart rate, and blood pressure. They will also look at your eyes and ears. They will ask about how you are feeling and whether you have any symptoms that bother you.   Medicines - It's a good idea to bring a list of all the medicines you take to each doctor visit. Your doctor will talk to you about your medicines and answer any questions. Tell them if you are having any side effects that bother you. You should also tell them if you are having trouble paying for any of your medicines.   Habits and behaviors - This includes:   Your diet   Your exercise habits   Whether you smoke, drink alcohol, or use drugs   Whether you are sexually active   Whether you feel safe at home  Your doctor will talk to you about things you can do to improve your health and lower your risk of health problems. They will also offer help and support. For example, if you want to quit smoking, they can give you advice and might prescribe medicines. If you want to improve your diet or get more physical activity, they can help you with this, too.   Lab " "tests, if needed - The tests you get will depend on your age and situation. For example, your doctor might want to check your:   Cholesterol   Blood sugar   Iron level   Vaccines - The recommended vaccines will depend on your age, health, and what vaccines you already had. Vaccines are very important because they can prevent certain serious or deadly infections.   Discussion of screening - \"Screening\" means checking for diseases or other health problems before they cause symptoms. Your doctor can recommend screening based on your age, risk, and preferences. This might include tests to check for:   Cancer, such as breast, prostate, cervical, ovarian, colorectal, prostate, lung, or skin cancer   Sexually transmitted infections, such as chlamydia and gonorrhea   Mental health conditions like depression and anxiety  Your doctor will talk to you about the different types of screening tests. They can help you decide which screenings to have. They can also explain what the results might mean.   Answering questions - The physical is a good time to ask the doctor or nurse questions about your health. If needed, they can refer you to other doctors or specialists, too.  Adults older than 65 years often need other care, too. As you get older, your doctor will talk to you about:   How to prevent falling at home   Hearing or vision tests   Memory testing   How to take your medicines safely   Making sure that you have the help and support you need at home  All topics are updated as new evidence becomes available and our peer review process is complete.  This topic retrieved from [employer] on: May 02, 2024.  Topic 150126 Version 1.0  Release: 32.4.3 - C32.122  © 2024 UpToDate, Inc. and/or its affiliates. All rights reserved.  Consumer Information Use and Disclaimer   Disclaimer: This generalized information is a limited summary of diagnosis, treatment, and/or medication information. It is not meant to be comprehensive and should be " used as a tool to help the user understand and/or assess potential diagnostic and treatment options. It does NOT include all information about conditions, treatments, medications, side effects, or risks that may apply to a specific patient. It is not intended to be medical advice or a substitute for the medical advice, diagnosis, or treatment of a health care provider based on the health care provider's examination and assessment of a patient's specific and unique circumstances. Patients must speak with a health care provider for complete information about their health, medical questions, and treatment options, including any risks or benefits regarding use of medications. This information does not endorse any treatments or medications as safe, effective, or approved for treating a specific patient. UpToDate, Inc. and its affiliates disclaim any warranty or liability relating to this information or the use thereof.The use of this information is governed by the Terms of Use, available at https://www.[employer].com/en/know/clinical-effectiveness-terms. 2024© UpToDate, Inc. and its affiliates and/or licensors. All rights reserved.  Copyright   © 2024 UpToDate, Inc. and/or its affiliates. All rights reserved.

## 2024-09-27 DIAGNOSIS — I10 ESSENTIAL HYPERTENSION: ICD-10-CM

## 2024-09-28 RX ORDER — OLMESARTAN MEDOXOMIL AND HYDROCHLOROTHIAZIDE 40/12.5 40; 12.5 MG/1; MG/1
1 TABLET ORAL DAILY
Qty: 90 TABLET | Refills: 1 | Status: SHIPPED | OUTPATIENT
Start: 2024-09-28

## 2024-09-30 ENCOUNTER — TELEPHONE (OUTPATIENT)
Dept: HEMATOLOGY ONCOLOGY | Facility: CLINIC | Age: 69
End: 2024-09-30

## 2024-09-30 NOTE — TELEPHONE ENCOUNTER
Called patient and scheduled follow up appt. Patient seeing surg onc 11/4, will see Dr Rowley the same day before that appt. Patient acknowledged.

## 2024-10-14 DIAGNOSIS — I10 PRIMARY HYPERTENSION: ICD-10-CM

## 2024-10-16 RX ORDER — AMLODIPINE BESYLATE 5 MG/1
5 TABLET ORAL DAILY
Qty: 90 TABLET | Refills: 0 | Status: SHIPPED | OUTPATIENT
Start: 2024-10-16

## 2024-11-04 ENCOUNTER — OFFICE VISIT (OUTPATIENT)
Dept: HEMATOLOGY ONCOLOGY | Facility: CLINIC | Age: 69
End: 2024-11-04
Payer: COMMERCIAL

## 2024-11-04 ENCOUNTER — OFFICE VISIT (OUTPATIENT)
Dept: SURGICAL ONCOLOGY | Facility: CLINIC | Age: 69
End: 2024-11-04
Payer: COMMERCIAL

## 2024-11-04 VITALS
WEIGHT: 175 LBS | DIASTOLIC BLOOD PRESSURE: 66 MMHG | SYSTOLIC BLOOD PRESSURE: 128 MMHG | HEIGHT: 61 IN | BODY MASS INDEX: 33.04 KG/M2 | HEART RATE: 105 BPM | OXYGEN SATURATION: 98 % | TEMPERATURE: 97.2 F | RESPIRATION RATE: 16 BRPM

## 2024-11-04 VITALS
SYSTOLIC BLOOD PRESSURE: 122 MMHG | OXYGEN SATURATION: 98 % | TEMPERATURE: 97.2 F | HEIGHT: 61 IN | DIASTOLIC BLOOD PRESSURE: 84 MMHG | WEIGHT: 176 LBS | HEART RATE: 99 BPM | BODY MASS INDEX: 33.23 KG/M2

## 2024-11-04 DIAGNOSIS — Z17.0 MALIGNANT NEOPLASM OF LOWER-OUTER QUADRANT OF LEFT BREAST OF FEMALE, ESTROGEN RECEPTOR POSITIVE (HCC): Primary | ICD-10-CM

## 2024-11-04 DIAGNOSIS — C50.512 MALIGNANT NEOPLASM OF LOWER-OUTER QUADRANT OF LEFT BREAST OF FEMALE, ESTROGEN RECEPTOR POSITIVE (HCC): Primary | ICD-10-CM

## 2024-11-04 DIAGNOSIS — T45.1X5A CHEMOTHERAPY INDUCED NEUTROPENIA (HCC): ICD-10-CM

## 2024-11-04 DIAGNOSIS — Z79.811 USE OF ANASTROZOLE: ICD-10-CM

## 2024-11-04 DIAGNOSIS — D70.1 CHEMOTHERAPY INDUCED NEUTROPENIA (HCC): ICD-10-CM

## 2024-11-04 PROCEDURE — 99213 OFFICE O/P EST LOW 20 MIN: CPT | Performed by: NURSE PRACTITIONER

## 2024-11-04 PROCEDURE — 99215 OFFICE O/P EST HI 40 MIN: CPT | Performed by: INTERNAL MEDICINE

## 2024-11-04 RX ORDER — ANASTROZOLE 1 MG/1
1 TABLET ORAL DAILY
Qty: 90 TABLET | Refills: 3 | Status: SHIPPED | OUTPATIENT
Start: 2024-11-04

## 2024-11-04 NOTE — PROGRESS NOTES
Hematology/Oncology Outpatient Office Note    Date of Service: 2024    [facility] [place]  [address]  [place] [address]  [phone]    Reason for Consultation:   Chief Complaint   Patient presents with    Follow-up       Cancer Stage at diagnosis: IIA    Referral Physician: No ref. provider found    Primary Care Physician:  Pacheco Fry DO     Nickname: Parul    Spouse: Claudia Mann ECO    Today's ECO    Goals and Barriers:  Current Goal: Minimize effects of disease burden, extend life.   Barriers to accomplishing this: None    Patient's Capacity to Self Care:  Patient is able to self care      ASSESSMENT & PLAN      Diagnosis ICD-10-CM Associated Orders   1. Malignant neoplasm of lower-outer quadrant of left breast of female, estrogen receptor positive (HCC)  C50.512     Z17.0       2. Use of anastrozole  Z79.811           This is a 69 y.o. c PMHx notable for HLP, HTN, vertigo, being seen in consultation for L Localized HR+ Breast cancer on adjuvant endocrine therapy    Pt is s/p lumpectomy, sentinel lymph node biopsy. BRCA gene mutation is negative. S/p 6 cycles TCH, Herceptin. Now on Anastrozole for a 5 year goal.           Discussion of decision making  Oncology history updated, accordingly, during this visit  Goals of care/patient communication  I discussed with the patient the clinical course leading up to their cancer diagnosis. I reviewed relevant office notes, imaging reports and pathology result as well.  I told the patient that this is a case of curable disease and what this means.   I explained the risks/benefits of the proposed cancer therapy: Anastrozole and after discussion including understanding risks of possible life-threatening complications and therapy-related malignancy development, informed consent for blood products and treatment has been verbally obtained.  TNM/Staging At Diagnosis  Cancer Staging    Malignant neoplasm of lower-outer quadrant of left breast of female, estrogen receptor positive (HCC)  Staging form: Breast, AJCC 8th Edition  - Pathologic: Stage IIA (pT2, pN0(sn), cM0, G3, ER+, WA-, HER2+) - Unsigned  Neoadjuvant therapy: No  Method of lymph node assessment: Oracle lymph node biopsy  Histologic grading system: 3 grade system  Laterality: Left  Tumor size (mm): 22    Disease Features/Tumor Markers/Genetics  Tumor Marker: n/a  Notable Path Features: noted  Treatment: Anastrozole   Other Supportive care:   Treatment Team Members  Surgeon: Dr. Padgett  Rad Onc  Palliative  Cards: Dr. Balbuena  Labs  Diagnostics   9/20/2024: DEXA indicates low bone density.      Discussion of decision making  Dexa indicated low bone density. We recommended weight bearing exercises and VitD/Calcium.     I personally reviewed the following lab results, the image studies, pathology, other specialty/physicians consult notes and recommendations, and outside medical records. I had a lengthy discussion with the patient and shared the work-up findings. We discussed the diagnosis and management plan as below. I spent 42 minutes reviewing the records (labs, clinician notes, outside records, medical history, ordering medicine/tests/procedures, monitoring of anti-neoplastic toxicities, interpreting the imaging/labs previously done) and coordination of care as well as direct time with the patient today, of which greater than 50% of the time was spent in counseling and coordination of care with the patient/family.    Plan/Labs  Cont Anastrozole 1 mg daily until 6/2025 to dejan 5 years, BCI to be ordered  DEXA (next due 9/2026)  Discussed Calcium 1200 mg, Vit D 1000 IU, and weight bearing exercises  US left axilla ordered  F/u Surg-Onc (goes twice a year for exams), surveillance mammograms (November)        Follow Up: 1 month.     All questions were answered to the patient's satisfaction during this encounter. The patient knows the  contact information for our office and knows to reach out for any relevant concerns related to this encounter. They are to call for any temperature 100.4 or higher, new symptoms including but not restricted to shaking chills, decreased appetite, nausea, vomiting, diarrhea, increased fatigue, shortness of breath or chest pain, confusion, and not feeling the strength to come to the clinic. For all other listed problems and medical diagnosis in their chart - they are managed by PCP and/or other specialists, which the patient acknowledges. Thank you very much for your consultation and making us a part of this patient's care. We are continuing to follow closely with you. Please do not hesitate to reach out to me with any additional questions or concerns.    Elisabet Rowley MD  Hematology & Medical Oncology Staff Physician             Disclaimer: This document was prepared using Luc[employer] Direct technology. If a word or phrase is confusing, or does not make sense, this is likely due to recognition error which was not discovered during this clinician's review. If you believe an error has occurred, please contact me through Hasht[employer] service line for darion?cation.      ONCOLOGY HISTORY OF PRESENT ILLNESS        Oncology History   Malignant neoplasm of lower-outer quadrant of left breast of female, estrogen receptor positive (HCC)   9/17/2019 Biopsy    Left breast ultrasound-guided biospy  Invasive ductal carcinoma   Grade 1  ER 70%  NV < 1%  HER2 3+     10/10/2019 Genetic Testing    The following genes were evaluated: MOI, BRCA1, BRCA2, CDH1, CHEK2, PALB2, PTEN, STK11, TP53  Negative result. No pathogenic sequence variants or deletions/dupllications identified  [employer]     10/29/2019 Surgery    Left breast lumpectomy with sentinel lymph node biopsy  Margins negative  0/1 Lymph Nodes  Anatomic/Prognostic Stage IIA    Dr. Padgett     12/6/2019 - 12/17/2020 Chemotherapy    pegfilgrastim (NEULASTA) subcutaneous injection 6  mg, 6 mg, Subcutaneous, Once, 1 of 1 cycle  Administration: 6 mg (12/30/2019)  pegfilgrastim (NEULASTA ONPRO) subcutaneous injection kit 6 mg, 6 mg, Subcutaneous, Once, 6 of 6 cycles  Administration: 6 mg (12/6/2019), 6 mg (12/27/2019), 6 mg (1/17/2020), 6 mg (2/7/2020), 6 mg (2/28/2020), 6 mg (3/20/2020)  fosaprepitant (EMEND) 150 mg in sodium chloride 0.9 % 255 mL IVPB, 150 mg, Intravenous, Once, 6 of 6 cycles  Administration: 150 mg (12/6/2019), 150 mg (12/27/2019), 150 mg (1/17/2020), 150 mg (2/7/2020), 150 mg (2/28/2020), 150 mg (3/20/2020)  CARBOplatin (PARAPLATIN) 616.8 mg in sodium chloride 0.9 % 250 mL IVPB, 616.8 mg, Intravenous, Once, 6 of 6 cycles  Administration: 616.8 mg (12/6/2019), 616.8 mg (12/27/2019), 616.8 mg (1/17/2020), 616.8 mg (2/7/2020), 616.8 mg (2/28/2020), 616.8 mg (3/20/2020)  DOCEtaxel (TAXOTERE) 106.2 mg in sodium chloride 0.9 % 250 mL chemo infusion, 60 mg/m2 = 106.2 mg (80 % of original dose 75 mg/m2), Intravenous, Once, 6 of 6 cycles  Dose modification: 60 mg/m2 (original dose 75 mg/m2, Cycle 1, Reason: Dose Not Tolerated), 75 mg/m2 (original dose 75 mg/m2, Cycle 2, Reason: Other (Must fill in a comment), Comment: LFT normalized. )  Administration: 106.2 mg (12/6/2019), 132.8 mg (12/27/2019), 132.8 mg (1/17/2020), 132.8 mg (2/7/2020), 132.8 mg (2/28/2020), 132.8 mg (3/20/2020)  trastuzumab (HERCEPTIN) 600 mg in sodium chloride 0.9 % 250 mL chemo infusion, 624 mg, Intravenous, Once, 18 of 18 cycles  Administration: 600 mg (12/6/2019), 450 mg (12/27/2019), 450 mg (4/10/2020), 450 mg (1/17/2020), 450 mg (2/7/2020), 450 mg (2/28/2020), 450 mg (3/20/2020), 450 mg (5/1/2020), 450 mg (5/22/2020), 450 mg (6/12/2020), 450 mg (7/2/2020), 450 mg (7/24/2020), 450 mg (8/14/2020), 450 mg (9/4/2020), 450 mg (9/28/2020), 450 mg (10/16/2020), 450 mg (11/6/2020), 450 mg (11/27/2020)     5/4/2020 - 6/2/2020 Radiation    Plan ID Energy Fractions Dose per Fraction (cGy) Dose Correction (cGy) Total Dose  "Delivered (cGy) Elapsed Days   BH L BOOST 6X 5 / 5 200 0 1,000 6   BH L BREAST 6X 16 / 16 266 0 4,256 22     Dr. Sharp       6/2020 -  Hormone Therapy    Anastrozole       Previous Hematologic/ Oncologic Treatment:       1. Adjuvant chemotherapy with TCH x6 cycle.  Completed in March 2020.      2. Adjuvant trastuzumab monotherapy , completed in November 2020.      Current Hematologic/ Oncologic Treatment:       1. Adjuvant hormonal therapy with anastrozole since June 2020.     SUBJECTIVE  (INTERVAL HISTORY)      Clotting History None   Bleeding History None   Cancer History L Breast   Family Cancer History Father (prostate)   H/O Blood/Plt Transfusion None   Tobacco/etoh/drug abuse 1 PPD x 30 years, quit mid 40's, no etoh abuse or rec drug use           Occupation Spouse is a [employer], she is retired from [employer]     Pain: mild joints that are normal aches    Would like to evaluate a \"pimple/swelling\" left axilla   Night sweats, hot flashes: none    I have reviewed the relevant past medical, surgical, social and family history. I have also reviewed allergies and medications for this patient.    Review of Systems    Baseline weight: 180 lbs    Denies F/C, N/V, SOB, CP, LH, HA, rash, itching, gen weakness, melena, hematuria, hematochezia, falls, diarrhea, or constipation       A 10-point review of system was performed, pertinent positive and negative were detailed as above. Otherwise, the 10-point review of system was negative.      Past Medical History:   Diagnosis Date    Breast cancer (HCC) 10/2019    grade 3 ER Positive [place] negative Her 2 3+ disease left    History of chemotherapy 03/2020    History of radiation therapy 04/2020    Hyperlipidemia     Hypertension     Vertigo     no medication       Past Surgical History:   Procedure Laterality Date    BREAST BIOPSY Left 09/17/2019    Left U/S BX invasive mammary carcinoma    BREAST BIOPSY Right 09/17/2021    benign    BREAST LUMPECTOMY Left " 10/29/2019    Procedure: BREAST NEEDLE LOCALIZED LUMPECTOMY (NEEDLE LOC AT 0800);  Surgeon: Aguilar Padgett MD;  Location: [facility];  Service: Surgical Oncology    FRACTURE SURGERY Left     surgery repair- elbow     IR PORT PLACEMENT  2019    IR PORT REMOVAL  2020    LYMPH NODE BIOPSY Left 10/29/2019    Procedure: SENTINEL LYMPH NODE BIOPSY; LYMPHATIC MAPPING WITH BLUE DYE AND RADIOACTIVE DYE (INJECT AT 0900 BY DR PADGETT IN THE OR);  Surgeon: Aguilar Padgett MD;  Location: [facility];  Service: Surgical Oncology    MAMMO NEEDLE LOCALIZATION LEFT (ALL INC) Left 10/29/2019    MAMMO STEREOTACTIC BREAST BIOPSY RIGHT (ALL INC) Right 2019    US GUIDANCE BREAST BIOPSY LEFT EACH ADDITIONAL Left 2019    US GUIDED BREAST BIOPSY LEFT COMPLETE Left 2019    WISDOM TOOTH EXTRACTION Bilateral        Family History   Problem Relation Age of Onset    No Known Problems Mother     Prostate cancer Father 70    Arthritis Father     Hypertension Father     No Known Problems Sister     No Known Problems Maternal Grandmother     No Known Problems Maternal Grandfather     No Known Problems Paternal Grandmother     No Known Problems Paternal Grandfather     No Known Problems Paternal Aunt     Nail disease Paternal Aunt     No Known Problems Paternal Aunt        Social History     Socioeconomic History    Marital status: /Civil Union     Spouse name: Not on file    Number of children: Not on file    Years of education: Not on file    Highest education level: Not on file   Occupational History    Not on file   Tobacco Use    Smoking status: Former     Current packs/day: 0.00     Average packs/day: 0.8 packs/day for 38.0 years (28.5 ttl pk-yrs)     Types: Cigarettes     Start date:      Quit date: 2010     Years since quittin.8     Passive exposure: Past    Smokeless tobacco: Never   Vaping Use    Vaping status: Never Used   Substance and Sexual Activity    Alcohol use: Yes    Drug use: Never    Sexual  activity: Yes     Partners: Female     Birth control/protection: None, Post-menopausal   Other Topics Concern    Not on file   Social History Narrative    Not on file     Social Determinants of Health     Financial Resource Strain: Not on file   Food Insecurity: Not on file   Transportation Needs: Not on file   Physical Activity: Not on file   Stress: Not on file   Social Connections: Not on file   Intimate Partner Violence: Not on file   Housing Stability: Not on file       Allergies   Allergen Reactions    Adhesive [Medical Tape] Hives       Current Outpatient Medications   Medication Sig Dispense Refill    amLODIPine (NORVASC) 5 mg tablet Take 1 tablet (5 mg total) by mouth daily 90 tablet 0    anastrozole (ARIMIDEX) 1 mg tablet Take 1 tablet (1 mg total) by mouth daily 90 tablet 3    ezetimibe (ZETIA) 10 mg tablet Take 1 tablet by mouth daily 90 tablet 0    Multiple Vitamins-Minerals (CENTRUM SILVER 50+WOMEN PO) Take 1 tablet by mouth daily in the early morning       olmesartan-hydrochlorothiazide (BENICAR HCT) 40-12.5 MG per tablet Take 1 tablet by mouth daily 90 tablet 1     No current facility-administered medications for this visit.       (Not in a hospital admission)      Objective:     24 Hour Vitals Assessment:     Vitals:    11/04/24 0907   BP: 128/66   Pulse: 105   Resp: 16   Temp: (!) 97.2 °F (36.2 °C)   SpO2: 98%         PHYSICIAN EXAM:    General: Appearance: alert, cooperative, no distress.  HEENT: Normocephalic, atraumatic. No scleral icterus. conjunctivae clear. EOMI.  Chest: No tenderness to palpation. No open wound noted.  Lungs: Clear to auscultation bilaterally, Respirations unlabored.  Cardiac: Tachycardia, +S1and S2  Abdomen: Soft, non-tender, non-distended. Bowel sounds are normal.  Extremities:  No edema, cyanosis, clubbing. L axilla with small 1 cm lump  Skin: Skin color, turgor are normal. No rashes.  Lymphatics: no palpable supra-cervical, axillary, or inguinal adenopathy  Neurologic:  Awake, Alert, and oriented, no gross focal deficits noted b/l.       DATA REVIEW:    Pathology Result:    Final Diagnosis   Date Value Ref Range Status   08/07/2020   Final    A.  Cervix, location not specified #1, biopsy:  - Endocervical polyp/fibroepithelial cervical polyp.    B.  Cervix, location not specified #2, biopsy:  - Endocervical polyp with nabothian cysts.     Clinical correlation is recommended to address whether the lesion has been completely removed and/or sufficiently sampled to assure that the maximum pathologic findings have been determined.    Interpretation performed at [facility], [address] [place] [address]     10/29/2019   Final    A. Left breast, wire-guided & oriented lumpectomy:  - Invasive mammary carcinoma of no special type (ductal, not otherwise specified) - see Note and synoptic report.   * Dixon histologic grade 3 of 3 (total score: 3+3+3 = 9)    -- glandular (acinar) tubular differentiation < 10%, score 3     -- nuclear pleomorphism grade 3 of 3, score 3    -- mitotic rate up to 11/mm2, score 3.   * invasive carcinoma is co-located with prior biopsy site and is multifocal, with main tumor measuring up to 22 millimeters maximal diameter (slides A10-1, A11-1, A12-1 & A13-1) and three satellite tumors, each between 1 & 2 millimeters diameter (A7-1, A8-1, & A25-1).   * unremarkable skin is present and appears uninvolved by invasive carcinoma; no skeletal muscle seen.   * invasive carcinoma is focally present at a cauterized, blue-inked inferior margin; all other lumpectomy margins are uninvolved by invasive carcinoma.  - Ductal carcinoma in-situ (DCIS): present, minor component (<25% of total tumor).    * architectural patterns: solid & cribriform   * nuclear grade: grade 2 of 3   * necrosis: central comedo-type necrosis is present.   * all lumpectomy margins appear uninvolved by DCIS.  - Lymphovascular invasion: not identified.  - Microcalcifications: absent.  -  Additional pathologic findings:  non-proliferative fibrocystic changes, without atypia, including columnar cell change.    B. Left breast inferior margin blue:  - No invasive or in situ carcinoma seen - specimen is benign fibroadipose tissue; no breast glandular tissue seen.  - Final margins are checked and appear uninvolved by neoplasia.    C. Left breast lateral margin red:  - No invasive or in situ carcinoma seen - specimen is benign fatty breast tissue.  - Final margins are checked and appear uninvolved by neoplasia.    D. Left breast medial margin yellow:  - No invasive or in situ carcinoma seen - specimen is benign fatty breast tissue.  - Final margins are checked and appear uninvolved by neoplasia.    E. Left breast posterior margin:  - No invasive or in situ carcinoma seen - specimen is benign fatty breast tissue.  - Final margins are checked and appear uninvolved by neoplasia.    F. Left breast superior margin orange:  - No invasive or in situ carcinoma seen - specimen is benign proliferative fibrocystic changes, without atypia, including usual ductal hyperplasia.  - Intraductal microcalcifications are present in fibrocystic change.  - Final margins are checked and appear uninvolved by neoplasia.    G. Lymph node sentinel, left breast:  - One lymph node is indefinite for a minute group of individual tumor cells (~32 cells, < 0.2 millimeters) in subcapsular sinus, pN0(sn) - see Note.     09/17/2019   Final    A. Left breast, 5:00, 7 cm from nipple, ultrasound-guided large core (12 G) needle biopsy x 3:  - Invasive mammary carcinoma of no special type (ductal, not otherwise specified).   * [place] histologic grade 1 of 3 (total score: 2+2+1 = 5)    -- glandular (acinar) tubular differentiation ~ 15%, score 2     -- nuclear pleomorphism 2 of 3, score 2    -- mitotic rate < 3/mm2, score 1.   * confirmed by tumor cell immunophenotype:    -- positive: E-cadherin, P120 - each in a cytoplasmic  membrane-restricted pattern.    -- negative: p63, calponin-B, [facility].   * invasive carcinoma involves 3 of 3 submitted core biopsies, maximal dimension = 14 millimeters.   * estrogen, progesterone & HER2 receptor studies pending, to be described in an addendum report.  - Ductal carcinoma in-situ (DCIS): present, minor component (<25% of total tumor).    * architectural patterns: cribriform   * nuclear grade: grade 2 of 3   * necrosis: central comedo-type necrosis is present.  - Lymphovascular invasion: not identified.  - Microcalcifications: absent.  - Best representative tumor block: A1  - Sufficient tumor present for:   * Agendia Mammaprint/Blueprint (1 cm2 of invasive tumor in aggregate): Yes   * MI Profile/Foundation One (at least 5 x 5 mm of tumor): Yes.    Note:  Intradepartmental consultation concurs with invasive ductal carcinoma.  Final report is telephonically discussed with Ms. BRUNO Loya, [facility] @ 10:05 AM, 9/19/2019.    B. Left breast, 1:00 5 cm from nipple, ultrasound-guided large core needle biopsy x 2:  - Benign, proliferative fibrocystic changes, without atypia, including usual ductal hyperplasia adjacent to and involving an intraductal micropapilloma; no calcifications seen.  - No evidence of malignancy.    C. Right breast without calcifications, large core needle biopsy x 5:  - Benign, proliferative fibrocystic changes, without atypia, including usual ductal hyperplasia; no calcifications seen.  - No evidence of malignancy.     09/17/2019   Final    A. Left breast, 5:00, 7 cm from nipple, ultrasound-guided large core (12 G) needle biopsy x 3:  - Invasive mammary carcinoma of no special type (ductal, not otherwise specified).   * Dixon histologic grade 1 of 3 (total score: 2+2+1 = 5)    -- glandular (acinar) tubular differentiation ~ 15%, score 2     -- nuclear pleomorphism 2 of 3, score 2    -- mitotic rate < 3/mm2, score 1.   * confirmed by tumor cell immunophenotype:    --  positive: E-cadherin, P120 - each in a cytoplasmic membrane-restricted pattern.    -- negative: p63, calponin-B, [facility].   * invasive carcinoma involves 3 of 3 submitted core biopsies, maximal dimension = 14 millimeters.   * estrogen, progesterone & HER2 receptor studies pending, to be described in an addendum report.  - Ductal carcinoma in-situ (DCIS): present, minor component (<25% of total tumor).    * architectural patterns: cribriform   * nuclear grade: grade 2 of 3   * necrosis: central comedo-type necrosis is present.  - Lymphovascular invasion: not identified.  - Microcalcifications: absent.  - Best representative tumor block: A1  - Sufficient tumor present for:   * Agendia Mammaprint/Blueprint (1 cm2 of invasive tumor in aggregate): Yes   * MI Profile/Foundation One (at least 5 x 5 mm of tumor): Yes.    Note:  Intradepartmental consultation concurs with invasive ductal carcinoma.  Final report is telephonically discussed with Ms. BRUNO Loya, [facility] @ 10:05 AM, 9/19/2019.    B. Left breast, 1:00 5 cm from nipple, ultrasound-guided large core needle biopsy x 2:  - Benign, proliferative fibrocystic changes, without atypia, including usual ductal hyperplasia adjacent to and involving an intraductal micropapilloma; no calcifications seen.  - No evidence of malignancy.    C. Right breast without calcifications, large core needle biopsy x 5:  - Benign, proliferative fibrocystic changes, without atypia, including usual ductal hyperplasia; no calcifications seen.  - No evidence of malignancy.          Image Results:   Image result are reviewed and documented in Hematology/Oncology history    DXA bone density spine hip and pelvis  Narrative: CENTRAL  DXA SCAN    CLINICAL HISTORY:   68 year old post-menopausal  female risk factors include osteoporosis screening. Additional medical history: Personal history of breast cancer 2019 with treatment.    TECHNIQUE: Bone densitometry was performed  using a Hologic Horizon W bone densitometer.  Regions of interest appear properly placed. There are no obvious fractures or other confounding variables which could limit the study.    COMPARISON: Baseline    RESULTS:  LUMBAR SPINE:  L1-L4:  BMD 0.938 gm/cm2  T-score -1.0  Z-score    LEFT TOTAL HIP:  BMD 0.840 gm/cm2  T-score -0.8  Z-score    LEFT FEMORAL NECK:  BMD 0.728 gm/cm2  T-score -1.1  Z-score  Impression: 1. Based on the WHO classification, this study identifies a diagnosis of low bone mass, notable at the femoral neck area and the patient is considered at low risk for fracture.    2. A daily intake of calcium of at least 1200 mg and vitamin D, 800-1000 IU, as well as weight bearing and muscle strengthening exercise, fall prevention and avoidance of tobacco and excessive alcohol intake as basic preventive measures are recommended.    3. Repeat DXA scan on the same equipment in 18-24 months as clinically indicated.    The 10 year risk of hip fracture is 0.7%, with the 10 year risk of major osteoporotic fracture being 8.1%, as calculated by the WHO fracture risk assessment tool (FRAX).  The current NOF guidelines recommend treating patients with FRAX 10 year risk score   of >3% for hip fracture and >20% for major osteoporotic fracture.    WHO CLASSIFICATION:  Normal (a T-score of -1.0 or higher)  Low bone mineral density (a T-score of less than -1.0 but higher than -2.5)  Osteoporosis (a T-score of -2.5 or less)  Severe osteoporosis (a T-score of -2.5 or less with a fragility fracture)    Thank you for allowing us the opportunity to participate in your patient care.    The expanded DEXA report will no longer be arriving in your mail. If you desire to view the full report please contact [facility] medical records or access the PACS system.    Workstation performed: [id number]      LABS:  Lab data are reviewed and documented in HemOnc history.       Lab Results   Component Value Date    HGB 13.9 08/05/2022     "HCT 42.4 08/05/2022    MCV 99 (H) 08/05/2022     08/05/2022    WBC 5.53 08/05/2022    NRBC 0 06/14/2021     Lab Results   Component Value Date    K 4.3 01/11/2024    CL 98 01/11/2024    CO2 29 01/11/2024    BUN 11 01/11/2024    CREATININE 0.70 01/11/2024    GLUF 87 01/11/2024    CALCIUM 9.9 01/11/2024    AST 65 (H) 01/11/2024     (H) 01/11/2024    ALKPHOS 75 01/11/2024    EGFR 89 01/11/2024       No results found for: \"IRON\", \"TIBC\", \"FERRITIN\"    No results found for: \"ZICEQLQA26\"    No results for input(s): \"WBC\", \"CREAT\", \"PLT\" in the last 72 hours.    By:  Racheal Velazquez MD, 11/4/2024, 9:29 AM                                  "

## 2024-11-04 NOTE — PROGRESS NOTES
Surgical Oncology Follow Up       [address]  [facility] [place]  [address]  [facility] [id number]    Parul Kelley  1955  [id number]  [address] [facility] [place]  [address] [place] [id number]    Chief Complaint   Patient presents with    Follow-up       Assessment/Plan:  1. Malignant neoplasm of lower-outer quadrant of left breast of female, estrogen receptor positive (HCC)  - 1 year f/u visit    2. Use of anastrozole  - per medical oncology      Discussion/Summary: Patient is a 69-year-old female that was diagnosed with a left-sided breast cancer in September 2019. Her pathology revealed invasive ductal carcinoma, ER 70%, IL negative, HER2 positive.  She underwent genetic testing which was negative.  She underwent a left lumpectomy and sentinel node biopsy by Dr. Padgett.  She completed adjuvant chemotherapy with TCH, Herceptin monotherapy and adjuvant RT. She is currently taking anastrozole.  She had a bilateral mammogram in November 2023 which was BI-RADS 2, category 2 density.  About 2 weeks ago she noticed a superficial palpable lump in her left axilla.  Clinical exam reveals what appears as a skin cyst.  She has been ordered for an ultrasound ultrasound evaluation of this finding.  She is also scheduled for a mammogram later this month.  Otherwise she offers no new complaints and there are no worrisome findings on her clinical exam.  She is now 5 years from her diagnosis.  I will therefore plan to see her back in 1 year or sooner should the need arise.  She was instructed to contact us with any changes or concerns in the interim.  All of her questions were answered today.      History of Present Illness:     Oncology History   Malignant neoplasm of lower-outer quadrant of left breast of female, estrogen receptor positive (HCC)   9/17/2019 Biopsy    Left breast ultrasound-guided biospy  Invasive ductal  carcinoma   Grade 1  ER 70%  PA < 1%  HER2 3+     10/10/2019 Genetic Testing    The following genes were evaluated: MOI, BRCA1, BRCA2, CDH1, CHEK2, PALB2, PTEN, STK11, TP53  Negative result. No pathogenic sequence variants or deletions/dupllications identified  [facility]     10/29/2019 Surgery    Left breast lumpectomy with sentinel lymph node biopsy  Margins negative  0/1 Lymph Nodes  Anatomic/Prognostic Stage IIA    Dr. Padgett     12/6/2019 - 12/17/2020 Chemotherapy    pegfilgrastim (NEULASTA) subcutaneous injection 6 mg, 6 mg, Subcutaneous, Once, 1 of 1 cycle  Administration: 6 mg (12/30/2019)  pegfilgrastim (NEULASTA ONPRO) subcutaneous injection kit 6 mg, 6 mg, Subcutaneous, Once, 6 of 6 cycles  Administration: 6 mg (12/6/2019), 6 mg (12/27/2019), 6 mg (1/17/2020), 6 mg (2/7/2020), 6 mg (2/28/2020), 6 mg (3/20/2020)  fosaprepitant (EMEND) 150 mg in sodium chloride 0.9 % 255 mL IVPB, 150 mg, Intravenous, Once, 6 of 6 cycles  Administration: 150 mg (12/6/2019), 150 mg (12/27/2019), 150 mg (1/17/2020), 150 mg (2/7/2020), 150 mg (2/28/2020), 150 mg (3/20/2020)  CARBOplatin (PARAPLATIN) 616.8 mg in sodium chloride 0.9 % 250 mL IVPB, 616.8 mg, Intravenous, Once, 6 of 6 cycles  Administration: 616.8 mg (12/6/2019), 616.8 mg (12/27/2019), 616.8 mg (1/17/2020), 616.8 mg (2/7/2020), 616.8 mg (2/28/2020), 616.8 mg (3/20/2020)  DOCEtaxel (TAXOTERE) 106.2 mg in sodium chloride 0.9 % 250 mL chemo infusion, 60 mg/m2 = 106.2 mg (80 % of original dose 75 mg/m2), Intravenous, Once, 6 of 6 cycles  Dose modification: 60 mg/m2 (original dose 75 mg/m2, Cycle 1, Reason: Dose Not Tolerated), 75 mg/m2 (original dose 75 mg/m2, Cycle 2, Reason: Other (Must fill in a comment), Comment: LFT normalized. )  Administration: 106.2 mg (12/6/2019), 132.8 mg (12/27/2019), 132.8 mg (1/17/2020), 132.8 mg (2/7/2020), 132.8 mg (2/28/2020), 132.8 mg (3/20/2020)  trastuzumab (HERCEPTIN) 600 mg in sodium chloride 0.9 % 250 mL chemo infusion, 624 mg,  Intravenous, Once, 18 of 18 cycles  Administration: 600 mg (12/6/2019), 450 mg (12/27/2019), 450 mg (4/10/2020), 450 mg (1/17/2020), 450 mg (2/7/2020), 450 mg (2/28/2020), 450 mg (3/20/2020), 450 mg (5/1/2020), 450 mg (5/22/2020), 450 mg (6/12/2020), 450 mg (7/2/2020), 450 mg (7/24/2020), 450 mg (8/14/2020), 450 mg (9/4/2020), 450 mg (9/28/2020), 450 mg (10/16/2020), 450 mg (11/6/2020), 450 mg (11/27/2020)     5/4/2020 - 6/2/2020 Radiation    Plan ID Energy Fractions Dose per Fraction (cGy) Dose Correction (cGy) Total Dose Delivered (cGy) Elapsed Days   BH L BOOST 6X 5 / 5 200 0 1,000 6   BH L BREAST 6X 16 / 16 266 0 4,256 22     Dr. Sharp       6/2020 -  Hormone Therapy    Anastrozole          -Interval History: Patient presents today for follow-up visit.  She continues on anastrozole.  She stated that her left axilla seemed tight about 2 weeks ago and when she was massaging the area she palpated a superficial lump.  She brought this to the attention of her medical oncologist earlier today who ordered an axillary ultrasound.  Otherwise she denies any new or persistent headaches, back pain or bone pain, cough or shortness of breath, abdominal pain.    Review of Systems:  Review of Systems   Constitutional:  Negative for activity change, appetite change, chills, fatigue, fever and unexpected weight change.   Respiratory:  Negative for cough and shortness of breath.    Cardiovascular:  Negative for chest pain.   Gastrointestinal:  Negative for abdominal pain, constipation, diarrhea, nausea and vomiting.   Musculoskeletal:  Negative for arthralgias, back pain, gait problem and myalgias.   Skin:  Negative for color change and rash.   Neurological:  Negative for dizziness and headaches.   Hematological:  Negative for adenopathy.   Psychiatric/Behavioral:  Negative for agitation and confusion.    All other systems reviewed and are negative.      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)    Obesity, morbid (HCC)     Past Medical History:   Diagnosis Date    Breast cancer (HCC) 10/2019    grade 3 ER Positive [place] negative Her 2 3+ disease left    History of chemotherapy 03/2020    History of radiation therapy 04/2020    Hyperlipidemia     Hypertension     Vertigo     no medication     Past Surgical History:   Procedure Laterality Date    BREAST BIOPSY Left 09/17/2019    Left U/S BX invasive mammary carcinoma    BREAST BIOPSY Right 09/17/2021    benign    BREAST LUMPECTOMY Left 10/29/2019    Procedure: BREAST NEEDLE LOCALIZED LUMPECTOMY (NEEDLE LOC AT 0800);  Surgeon: Aguilar Padgett MD;  Location: [facility];  Service: Surgical Oncology    FRACTURE SURGERY Left     surgery repair- elbow     IR PORT PLACEMENT  12/03/2019    IR PORT REMOVAL  12/29/2020    LYMPH NODE BIOPSY Left 10/29/2019    Procedure: SENTINEL LYMPH NODE BIOPSY; LYMPHATIC MAPPING WITH BLUE DYE AND RADIOACTIVE DYE (INJECT AT 0900 BY DR PADGETT IN THE OR);  Surgeon: Aguilar Padgett MD;  Location: [facility];  Service: Surgical Oncology    MAMMO NEEDLE LOCALIZATION LEFT (ALL INC) Left 10/29/2019    MAMMO STEREOTACTIC BREAST BIOPSY RIGHT (ALL INC) Right 09/17/2019    US GUIDANCE BREAST BIOPSY LEFT EACH ADDITIONAL Left 09/17/2019    US GUIDED BREAST BIOPSY LEFT COMPLETE Left 09/17/2019    WISDOM TOOTH EXTRACTION Bilateral      Family History   Problem Relation Age of Onset    No Known Problems Mother     Prostate cancer Father 70    Arthritis Father     Hypertension Father     No Known Problems Sister     No Known Problems Maternal Grandmother     No Known Problems Maternal Grandfather     No Known Problems Paternal Grandmother     No Known Problems Paternal Grandfather     No Known Problems Paternal Aunt     Nail disease  Paternal Aunt     No Known Problems Paternal Aunt      Social History     Socioeconomic History    Marital status: /Civil Union     Spouse name: Not on file    Number of children: Not on file    Years of education: Not on file    Highest education level: Not on file   Occupational History    Not on file   Tobacco Use    Smoking status: Former     Current packs/day: 0.00     Average packs/day: 0.8 packs/day for 38.0 years (28.5 ttl pk-yrs)     Types: Cigarettes     Start date:      Quit date:      Years since quittin.8     Passive exposure: Past    Smokeless tobacco: Never   Vaping Use    Vaping status: Never Used   Substance and Sexual Activity    Alcohol use: Yes    Drug use: Never    Sexual activity: Yes     Partners: Female     Birth control/protection: None, Post-menopausal   Other Topics Concern    Not on file   Social History Narrative    Not on file     Social Determinants of Health     Financial Resource Strain: Not on file   Food Insecurity: Not on file   Transportation Needs: Not on file   Physical Activity: Not on file   Stress: Not on file   Social Connections: Not on file   Intimate Partner Violence: Not on file   Housing Stability: Not on file       Current Outpatient Medications:     amLODIPine (NORVASC) 5 mg tablet, Take 1 tablet (5 mg total) by mouth daily, Disp: 90 tablet, Rfl: 0    anastrozole (ARIMIDEX) 1 mg tablet, Take 1 tablet (1 mg total) by mouth daily, Disp: 90 tablet, Rfl: 3    ezetimibe (ZETIA) 10 mg tablet, Take 1 tablet by mouth daily, Disp: 90 tablet, Rfl: 0    Multiple Vitamins-Minerals (CENTRUM SILVER 50+WOMEN PO), Take 1 tablet by mouth daily in the early morning , Disp: , Rfl:     olmesartan-hydrochlorothiazide (BENICAR HCT) 40-12.5 MG per tablet, Take 1 tablet by mouth daily, Disp: 90 tablet, Rfl: 1  Allergies   Allergen Reactions    Adhesive [Medical Tape] Hives     Vitals:    24 0939   BP: 122/84   Pulse: 99   Temp: (!) 97.2 °F (36.2 °C)   SpO2: 98%        Physical Exam  Vitals reviewed.   Constitutional:       General: She is not in acute distress.     Appearance: Normal appearance. She is well-developed. She is not diaphoretic.   HENT:      Head: Normocephalic and atraumatic.   Cardiovascular:      Rate and Rhythm: Normal rate and regular rhythm.      Heart sounds: Normal heart sounds.   Pulmonary:      Effort: Pulmonary effort is normal.      Breath sounds: Normal breath sounds.   Chest:   Breasts:     Right: No swelling, bleeding, inverted nipple, mass, nipple discharge, skin change or tenderness.      Left: Swelling (mild breast edema) and skin change (surgical scars- breast, axilla) present. No bleeding, inverted nipple, mass, nipple discharge or tenderness.          Comments: Small amount telangectasia left inferior breast    Small superficial palpable skin lump left axilla that appears to have a white head- likely skin cyst.   Abdominal:      Palpations: Abdomen is soft. There is no mass.      Tenderness: There is no abdominal tenderness.   Musculoskeletal:         General: Normal range of motion.      Cervical back: Normal range of motion.   Lymphadenopathy:      Upper Body:      Right upper body: No supraclavicular or axillary adenopathy.      Left upper body: No supraclavicular or axillary adenopathy.   Skin:     General: Skin is warm and dry.      Findings: No rash.   Neurological:      Mental Status: She is alert and oriented to person, place, and time.   Psychiatric:         Speech: Speech normal.         Advance Care Planning/Advance Directives:  Discussed disease status, cancer treatment plans and/or cancer treatment goals with the patient.

## 2024-11-17 DIAGNOSIS — I10 PRIMARY HYPERTENSION: ICD-10-CM

## 2024-11-17 DIAGNOSIS — E78.2 MIXED HYPERLIPIDEMIA: ICD-10-CM

## 2024-11-19 RX ORDER — EZETIMIBE 10 MG/1
10 TABLET ORAL DAILY
Qty: 90 TABLET | Refills: 0 | Status: SHIPPED | OUTPATIENT
Start: 2024-11-19

## 2024-11-20 ENCOUNTER — LAB REQUISITION (OUTPATIENT)
Dept: LAB | Facility: HOSPITAL | Age: 69
End: 2024-11-20

## 2024-11-20 DIAGNOSIS — C50.912 MALIGNANT NEOPLASM OF UNSPECIFIED SITE OF LEFT FEMALE BREAST (HCC): ICD-10-CM

## 2024-11-21 ENCOUNTER — HOSPITAL ENCOUNTER (OUTPATIENT)
Dept: ULTRASOUND IMAGING | Facility: CLINIC | Age: 69
Discharge: HOME | End: 2024-11-21
Payer: COMMERCIAL

## 2024-11-21 ENCOUNTER — HOSPITAL ENCOUNTER (OUTPATIENT)
Dept: MAMMOGRAPHY | Facility: CLINIC | Age: 69
Discharge: HOME | End: 2024-11-21
Payer: COMMERCIAL

## 2024-11-21 VITALS — HEIGHT: 61 IN | WEIGHT: 176 LBS | BODY MASS INDEX: 33.23 KG/M2

## 2024-11-21 DIAGNOSIS — C50.512 MALIGNANT NEOPLASM OF LOWER-OUTER QUADRANT OF LEFT BREAST OF FEMALE, ESTROGEN RECEPTOR POSITIVE (HCC): ICD-10-CM

## 2024-11-21 DIAGNOSIS — R22.32 LUMP OF AXILLA, LEFT: ICD-10-CM

## 2024-11-21 DIAGNOSIS — Z17.0 MALIGNANT NEOPLASM OF LOWER-OUTER QUADRANT OF LEFT BREAST OF FEMALE, ESTROGEN RECEPTOR POSITIVE (HCC): ICD-10-CM

## 2024-11-21 PROCEDURE — 77066 DX MAMMO INCL CAD BI: CPT

## 2024-11-21 PROCEDURE — 76642 ULTRASOUND BREAST LIMITED: CPT

## 2024-11-21 PROCEDURE — G0279 TOMOSYNTHESIS, MAMMO: HCPCS

## 2024-12-02 ENCOUNTER — TELEPHONE (OUTPATIENT)
Dept: CARDIOLOGY CLINIC | Facility: CLINIC | Age: 69
End: 2024-12-02

## 2024-12-02 NOTE — TELEPHONE ENCOUNTER
LMOM for this patient callback to schedule their 1 year followup in March 2025- please utilize the recall to schedule when patient calls back

## 2024-12-03 LAB — SCAN RESULT: NORMAL

## 2024-12-04 ENCOUNTER — TELEPHONE (OUTPATIENT)
Dept: HEMATOLOGY ONCOLOGY | Facility: CLINIC | Age: 69
End: 2024-12-04

## 2024-12-04 NOTE — TELEPHONE ENCOUNTER
BCI results in patient chart under media for provider review.   Appointment with Dr. Rowley on: 4/14/25

## 2024-12-11 NOTE — TELEPHONE ENCOUNTER
Patient Parul [phone] returning telephone call regarding 1 yr f/u w/Dr. Balbuena.    Patient mentioned she spoke with Dr. Balbuena and discussed testing results were fine.    Patient inquiring if f/u is necessary?

## 2024-12-19 ENCOUNTER — PATIENT MESSAGE (OUTPATIENT)
Dept: HEMATOLOGY ONCOLOGY | Facility: CLINIC | Age: 69
End: 2024-12-19

## 2024-12-26 ENCOUNTER — NEW PATIENT COMPREHENSIVE (OUTPATIENT)
Dept: URBAN - METROPOLITAN AREA CLINIC 6 | Facility: CLINIC | Age: 69
End: 2024-12-26

## 2024-12-26 DIAGNOSIS — H25.813: ICD-10-CM

## 2024-12-26 DIAGNOSIS — H35.373: ICD-10-CM

## 2024-12-26 DIAGNOSIS — H40.023: ICD-10-CM

## 2024-12-26 DIAGNOSIS — Z85.3: ICD-10-CM

## 2024-12-26 PROCEDURE — 92083 EXTENDED VISUAL FIELD XM: CPT

## 2024-12-26 PROCEDURE — 92133 CPTRZD OPH DX IMG PST SGM ON: CPT | Mod: NC

## 2024-12-26 PROCEDURE — 92004 COMPRE OPH EXAM NEW PT 1/>: CPT

## 2024-12-26 PROCEDURE — 92134 CPTRZ OPH DX IMG PST SGM RTA: CPT

## 2024-12-26 ASSESSMENT — VISUAL ACUITY
OS_CC: 20/40
OU_CC: J4
OD_CC: 20/25-1
OS_PH: 20/30+2

## 2024-12-26 ASSESSMENT — TONOMETRY
OD_IOP_MMHG: 23
OS_IOP_MMHG: 22

## 2025-01-11 DIAGNOSIS — I10 ESSENTIAL HYPERTENSION: ICD-10-CM

## 2025-01-14 ENCOUNTER — TELEPHONE (OUTPATIENT)
Dept: CARDIOLOGY CLINIC | Facility: CLINIC | Age: 70
End: 2025-01-14

## 2025-01-14 DIAGNOSIS — I10 ESSENTIAL HYPERTENSION: Primary | ICD-10-CM

## 2025-01-14 RX ORDER — OLMESARTAN MEDOXOMIL AND HYDROCHLOROTHIAZIDE 40/12.5 40; 12.5 MG/1; MG/1
1 TABLET ORAL DAILY
Qty: 90 TABLET | Refills: 0 | Status: SHIPPED | OUTPATIENT
Start: 2025-01-14

## 2025-01-14 NOTE — PROGRESS NOTES
Chart update:    Note from med refill team, needs BMP testing to refill blood pressure medication.  Order placed.

## 2025-01-14 NOTE — TELEPHONE ENCOUNTER
----- Message from Robert Balbuena MD sent at 1/14/2025  8:32 AM EST -----      Kaur -  can you see my note below.  The refill team renewed her BP med -- but gave 0 refills because she needs a BMP.  Can you let her know she needs a BMP.  I placed order.    Thanks  MDM

## 2025-01-22 DIAGNOSIS — I10 PRIMARY HYPERTENSION: ICD-10-CM

## 2025-01-23 RX ORDER — AMLODIPINE BESYLATE 5 MG/1
5 TABLET ORAL DAILY
Qty: 90 TABLET | Refills: 1 | Status: SHIPPED | OUTPATIENT
Start: 2025-01-23

## 2025-02-11 DIAGNOSIS — Z17.0 MALIGNANT NEOPLASM OF LOWER-OUTER QUADRANT OF LEFT BREAST OF FEMALE, ESTROGEN RECEPTOR POSITIVE (HCC): ICD-10-CM

## 2025-02-11 DIAGNOSIS — T45.1X5A CHEMOTHERAPY INDUCED NEUTROPENIA (HCC): ICD-10-CM

## 2025-02-11 DIAGNOSIS — D70.1 CHEMOTHERAPY INDUCED NEUTROPENIA (HCC): ICD-10-CM

## 2025-02-11 DIAGNOSIS — C50.512 MALIGNANT NEOPLASM OF LOWER-OUTER QUADRANT OF LEFT BREAST OF FEMALE, ESTROGEN RECEPTOR POSITIVE (HCC): ICD-10-CM

## 2025-02-11 RX ORDER — ANASTROZOLE 1 MG/1
1 TABLET ORAL DAILY
Qty: 90 TABLET | Refills: 0 | Status: SHIPPED | OUTPATIENT
Start: 2025-02-11

## 2025-02-18 ENCOUNTER — RESULTS FOLLOW-UP (OUTPATIENT)
Dept: CARDIOLOGY CLINIC | Facility: CLINIC | Age: 70
End: 2025-02-18

## 2025-02-18 ENCOUNTER — APPOINTMENT (OUTPATIENT)
Dept: LAB | Facility: CLINIC | Age: 70
End: 2025-02-18
Payer: COMMERCIAL

## 2025-02-18 DIAGNOSIS — I10 ESSENTIAL HYPERTENSION: ICD-10-CM

## 2025-02-18 LAB
ANION GAP SERPL CALCULATED.3IONS-SCNC: 9 MMOL/L (ref 4–13)
BUN SERPL-MCNC: 10 MG/DL (ref 5–25)
CALCIUM SERPL-MCNC: 10.2 MG/DL (ref 8.4–10.2)
CHLORIDE SERPL-SCNC: 96 MMOL/L (ref 96–108)
CO2 SERPL-SCNC: 30 MMOL/L (ref 21–32)
CREAT SERPL-MCNC: 0.64 MG/DL (ref 0.6–1.3)
GFR SERPL CREATININE-BSD FRML MDRD: 91 ML/MIN/1.73SQ M
GLUCOSE SERPL-MCNC: 113 MG/DL (ref 65–140)
POTASSIUM SERPL-SCNC: 4.2 MMOL/L (ref 3.5–5.3)
SODIUM SERPL-SCNC: 135 MMOL/L (ref 135–147)

## 2025-02-18 PROCEDURE — 80048 BASIC METABOLIC PNL TOTAL CA: CPT

## 2025-02-18 PROCEDURE — 36415 COLL VENOUS BLD VENIPUNCTURE: CPT

## 2025-03-13 DIAGNOSIS — E78.2 MIXED HYPERLIPIDEMIA: ICD-10-CM

## 2025-03-13 DIAGNOSIS — I10 PRIMARY HYPERTENSION: ICD-10-CM

## 2025-03-13 RX ORDER — EZETIMIBE 10 MG/1
10 TABLET ORAL DAILY
Qty: 90 TABLET | Refills: 1 | Status: SHIPPED | OUTPATIENT
Start: 2025-03-13

## 2025-03-27 NOTE — PROGRESS NOTES
Cardio-Oncology / Heart Failure Cardiology Clinic Note    Parul Kelley 69 y.o. female   MRN: [id number]  Encounter: [id number]        Assessment / Plan:    # Cardio-Oncology Pertinent History  Breast cancer  Dx 2019.  Left sided.   Prior chemotherapy -  TCH then herceptin monotherapy  Prior XRT  Current:  anastrozole    # Cardio-Oncology Survivorship Recommendations  Discussed that many cancer survivors are at increased risk of cardiovascular disease due to cancer treatments as well as comorbidities.  Discussed importance of optimizing cardiovascular risk factors and post-treatment cardiac surveillance when appropriate.    Left sided breast radiation, HER2 therapy    Annual history and physical  BP management - SEE BELOW  Lipids - SEE BELOW  Exercise -  doing exercise, rec 5 days per week of cardio   Healthy diet -  discussed.  Does a lot of plant based diet.   Post-treatment surveillance:   complete    # Cardiomyopathy - mild and likely due to chemotherapy  Pre-HER2 EF - 65%.  On HER2 therapy EF dropped to 50%.  EF has since normalized  Exam - euvolemic    # HTN    Olmesartan-HCTZ - 40 / 12.5 mg daily    Norvasc 5mg daily  BP well controlled     # HLD   --> 108  Didn't tolerate statin due to rise in LFT's.  Would not retry unless cleared by GI.  On zetia  Could ultimately consider coronary calcium scan to stratify risk    # Pre-diabetes  F/u with PCP    # Elevated AST / ALT  Remains elevated despite stopping the statin.   CT scan in 2019 showed fatty liver - possible explanation here.  Rec PCP follow up.   Rec weight loss and minimize ETOH.   Offerred GI consult, declines.      # obesity  BMI 35 --> 33  Weight loss recommended      Today's Plan Summary:  See above assessment/plan for full details of today's plan.  Briefly,     No med changes.                 Reason For Visit / Chief Complaint:  F/u  - mildly depressed EF on HER2 therapy in the past.    HPI:   Parul Kelley is a 69 y.o.  female with history  "as noted in the problem list and further detailed in the above assessment and plan.    Initial:  January 2024  Referred by Moe Blanco NP for evaluation in the setting of mildly depressed EF on HER2 therapy in the past.  As above, the patient has a history of breast cancer diagnosed in 2019.  Had chemotherapy including HER2 agents.  Also had radiation.  Currently on anastrozole.  Surveillance Echo's on HER2 therapy did show a drop in EF down to 50% in 2020.  The most recent echo later that year showed recovery of EF to 55%.  The patient also has hypertension, hyperlipidemia, prediabetes.  Today, the patient reports - reports feeling well.    Retired.  Prior  at [employer] - also worked at [employer] in [employer].   .  No children.  Former smoker (quit 20 years ago).  1 glass of wine per day.      Interval:  Last visit -->  reviewed echo.  Felt well.  Going to gym.  Eating very healthy.     Plan last visit -->   Today, blood pressure is elevated.  When she started the Norvasc last visit she actually stopped the combination of olmesartan / HCTZ.  Will restart this.     2-18-25 - BMP - normal.     Today  - \"I feel great\".   No CP or SOB.   No edema.   No palpitations.  No syncope.           Cardiac Imaging personally reviewed:  EKG 1-24-24  Normal sinus rhythm  LAE  Nonspecific T wave changes.    3-28-25  Normal sinus rhythm with PAC  LAE  Nonspecific T wave changes.       Holter or event monitor    Echo 11/2019  EF 65%    3/2020  EF 60%    7/2020  EF 50%    11/2020  EF 55%    Echo - 03/21/24   Mild LVH.  EF 67%.   GLS reduced at -14% (falsely low due to poor tracking of the lateral wall).   Normal RV size and function but increased wall thickness.   No significant valvular disease.  The inferior vena cava appears small.           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) 07/11/2022   • Use of anastrozole 11/30/2020   • Essential hypertension 06/22/2020   • Hyperlipidemia 06/22/2020   • Chemotherapy induced neutropenia (HCC) 11/21/2019   • Malignant neoplasm of lower-outer quadrant of left breast of female, estrogen receptor positive (HCC) 10/01/2019       Past Medical History:   Diagnosis Date   • Breast cancer (HCC) 10/2019    grade 3 ER Positive [place] negative Her 2 3+ disease left   • History of chemotherapy 03/2020   • History of radiation therapy 04/2020   • Hyperlipidemia    • Hypertension    • Vertigo     no medication         Allergies   Allergen Reactions   • Adhesive [Medical Tape] Hives       Current Outpatient Medications   Medication Instructions   • amLODIPine (NORVASC) 5 mg, Oral, Daily   • anastrozole (ARIMIDEX) 1 mg, Oral, Daily   • ezetimibe (ZETIA) 10 mg, Oral, Daily   • Multiple Vitamins-Minerals (CENTRUM SILVER 50+WOMEN PO) 1 tablet, Daily (early morning)   • olmesartan-hydrochlorothiazide (BENICAR HCT) 40-12.5 MG per tablet 1 tablet, Oral, Daily       Social History     Socioeconomic History   • Marital status: /Civil Union     Spouse name: Not on file   • Number of children: Not on file   • Years of education: Not on file   • Highest education level: Not on file   Occupational History   • Not on file   Tobacco Use   • Smoking status: Former     Current packs/day: 0.00     Average packs/day: 0.8 packs/day for 38.0 years (28.5 ttl pk-yrs)     Types: Cigarettes     Start date: 1972     Quit date: 2010     Years since quitting: 15.2     Passive exposure: Past   • Smokeless tobacco: Never   Vaping Use   • Vaping status: Never Used   Substance and Sexual Activity   • Alcohol use: Yes   • Drug use: Never   • Sexual activity: Yes     Partners: Female     Birth control/protection: None, Post-menopausal   Other Topics Concern   • Not on file   Social History Narrative   • Not on file     Social  "Drivers of Health     Financial Resource Strain: Not on file   Food Insecurity: Not on file   Transportation Needs: Not on file   Physical Activity: Not on file   Stress: Not on file   Social Connections: Not on file   Intimate Partner Violence: Not on file   Housing Stability: Not on file       Family History   Problem Relation Age of Onset   • No Known Problems Mother    • Prostate cancer Father 70   • Arthritis Father    • Hypertension Father    • No Known Problems Sister    • No Known Problems Maternal Grandmother    • No Known Problems Maternal Grandfather    • No Known Problems Paternal Grandmother    • No Known Problems Paternal Grandfather    • No Known Problems Paternal Aunt    • Nail disease Paternal Aunt    • No Known Problems Paternal Aunt        Physical Exam:  Blood pressure 120/70, pulse 100, height 5' 1\" (1.549 m), weight 80.3 kg (177 lb), SpO2 97%, not currently breastfeeding.  Body mass index is 33.44 kg/m².  Wt Readings from Last 3 Encounters:   03/28/25 80.3 kg (177 lb)   11/21/24 79.8 kg (176 lb)   11/04/24 79.8 kg (176 lb)     Physical Exam  Vitals reviewed.   Constitutional:       General: She is not in acute distress.     Appearance: She is not toxic-appearing.   Neck:      Comments: No JVD  Cardiovascular:      Rate and Rhythm: Normal rate and regular rhythm.      Heart sounds: No murmur heard.     No friction rub. No gallop.   Pulmonary:      Breath sounds: Normal breath sounds. No wheezing, rhonchi or rales.   Musculoskeletal:      Comments: No LE edema   Neurological:      Mental Status: She is alert.         Labs & Results:  Lab Results   Component Value Date    SODIUM 135 02/18/2025    K 4.2 02/18/2025    CL 96 02/18/2025    CO2 30 02/18/2025    BUN 10 02/18/2025    CREATININE 0.64 02/18/2025    GLUC 113 02/18/2025    CALCIUM 10.2 02/18/2025     No results found for: \"NTBNP\"       Thank you for the opportunity to participate in the care of this patient.    Robert Balbuena MD, " FACC  Staff Cardiologist  Director of Cardio-Oncology  [facility]

## 2025-03-28 ENCOUNTER — OFFICE VISIT (OUTPATIENT)
Dept: CARDIOLOGY CLINIC | Facility: CLINIC | Age: 70
End: 2025-03-28
Payer: COMMERCIAL

## 2025-03-28 VITALS
SYSTOLIC BLOOD PRESSURE: 120 MMHG | HEIGHT: 61 IN | WEIGHT: 177 LBS | HEART RATE: 100 BPM | DIASTOLIC BLOOD PRESSURE: 70 MMHG | OXYGEN SATURATION: 97 % | BODY MASS INDEX: 33.42 KG/M2

## 2025-03-28 DIAGNOSIS — E78.2 MIXED HYPERLIPIDEMIA: ICD-10-CM

## 2025-03-28 DIAGNOSIS — I10 PRIMARY HYPERTENSION: ICD-10-CM

## 2025-03-28 DIAGNOSIS — E66.01 OBESITY, MORBID (HCC): ICD-10-CM

## 2025-03-28 DIAGNOSIS — I10 ESSENTIAL HYPERTENSION: Primary | ICD-10-CM

## 2025-03-28 DIAGNOSIS — Z92.21 STATUS POST ADMINISTRATION OF CARDIOTOXIC CHEMOTHERAPY: ICD-10-CM

## 2025-03-28 DIAGNOSIS — I42.7 CARDIOMYOPATHY DUE TO DRUG AND EXTERNAL AGENT (HCC): ICD-10-CM

## 2025-03-28 PROCEDURE — 93000 ELECTROCARDIOGRAM COMPLETE: CPT | Performed by: INTERNAL MEDICINE

## 2025-03-28 PROCEDURE — 99214 OFFICE O/P EST MOD 30 MIN: CPT | Performed by: INTERNAL MEDICINE

## 2025-04-23 NOTE — PROGRESS NOTES
Name: Parul Kelley      : 1955      MRN: [id number]  Encounter Provider: Elisabet Rowley MD  Encounter Date: 2025   Encounter department: [facility]  :  Assessment & Plan  Malignant neoplasm of lower-outer quadrant of left breast of female, estrogen receptor positive (HCC)    This is a 69 y.o. c PMHx notable for HLP, HTN, vertigo, being seen in consultation for L Localized HR+ Breast cancer on adjuvant endocrine therapy       Use of anastrozole  Refilling today           No follow-ups on file.    History of Present Illness   No chief complaint on file.  History of Present Illness        Objective   LMP  (LMP Unknown)       Cancer Stage at diagnosis: IIA    Referral Physician: No ref. provider found    Primary Care Physician:  Pacheco Fry DO     Nickname: Parul    Spouse: Claudia Mann ECO    Today's ECO    Goals and Barriers:  Current Goal: Minimize effects of disease burden, extend life.   Barriers to accomplishing this: None    Patient's Capacity to Self Care:  Patient is able to self care        This is a 69 y.o. c PMHx notable for HLP, HTN, vertigo, being seen in consultation for L Localized HR+ Breast cancer on adjuvant endocrine therapy    Pt is s/p lumpectomy, sentinel lymph node biopsy. BRCA gene mutation is negative. S/p 6 cycles TCH, Herceptin. Now on Anastrozole for a 10 year goal.           Discussion of decision making  Oncology history updated, accordingly, during this visit  Goals of care/patient communication  I discussed with the patient the clinical course leading up to their cancer diagnosis. I reviewed relevant office notes, imaging reports and pathology result as well.  I told the patient that this is a case of curable disease and what this means.   I explained the risks/benefits of the proposed cancer therapy: Anastrozole and after discussion including understanding risks of possible life-threatening complications and  therapy-related malignancy development, informed consent for blood products and treatment has been verbally obtained.  TNM/Staging At Diagnosis  Cancer Staging   Malignant neoplasm of lower-outer quadrant of left breast of female, estrogen receptor positive (HCC)  Staging form: Breast, AJCC 8th Edition  - Pathologic: Stage IIA (pT2, pN0(sn), cM0, G3, ER+, NH-, HER2+) - Unsigned  Neoadjuvant therapy: No  Method of lymph node assessment: [place] lymph node biopsy  Histologic grading system: 3 grade system  Laterality: Left  Tumor size (mm): 22    Disease Features/Tumor Markers/Genetics  Tumor Marker: n/a  Notable Path Features: noted  Treatment: Anastrozole   Other Supportive care:   Treatment Team Members  Surgeon: Dr. Padgett  Rad Onc  Palliative  Cards: Dr. Balbuena  Labs  Diagnostics   9/20/2024 DEXA: indicates low bone density.  11/21/2024: US Breast Axilla Left Stable therapeutic changes left breast. No evidence for malignancy.  Small sebaceous cyst in the left axilla at the area of palpable concern  BCI: 8.1% risk distant recurrence at 5 years decreases to 2.7-3.4% with 10 years endocrine therapy    Discussion of decision making  Dexa indicated low bone density. We recommended weight bearing exercises and VitD/Calcium.     I personally reviewed the following lab results, the image studies, pathology, other specialty/physicians consult notes and recommendations, and outside medical records. I had a lengthy discussion with the patient and shared the work-up findings. We discussed the diagnosis and management plan as below. I spent 41 minutes reviewing the records (labs, clinician notes, outside records, medical history, ordering medicine/tests/procedures, monitoring of anti-neoplastic toxicities, interpreting the imaging/labs previously done) and coordination of care as well as direct time with the patient today, of which greater than 50% of the time was spent in counseling and coordination of care with the  patient/family.    Plan/Labs  Cont Anastrozole 1 mg daily until 6/2030 to dejan 10 years due to BCI results  DEXA (next due 9/2026)  Discussed Calcium 1200 mg, Vit D 1000 IU, and weight bearing exercises previously  F/u Surg-Onc (goes twice a year for exams), surveillance mammograms (November)        Follow Up: 12 months     All questions were answered to the patient's satisfaction during this encounter. The patient knows the contact information for our office and knows to reach out for any relevant concerns related to this encounter. They are to call for any temperature 100.4 or higher, new symptoms including but not restricted to shaking chills, decreased appetite, nausea, vomiting, diarrhea, increased fatigue, shortness of breath or chest pain, confusion, and not feeling the strength to come to the clinic. For all other listed problems and medical diagnosis in their chart - they are managed by PCP and/or other specialists, which the patient acknowledges. Thank you very much for your consultation and making us a part of this patient's care. We are continuing to follow closely with you. Please do not hesitate to reach out to me with any additional questions or concerns.    Elisabet Rowley MD  Hematology & Medical Oncology Staff Physician             Disclaimer: This document was prepared using [employer] Direct technology. If a word or phrase is confusing, or does not make sense, this is likely due to recognition error which was not discovered during this clinician's review. If you believe an error has occurred, please contact me through [facility] service line for darion?cation.      ONCOLOGY HISTORY OF PRESENT ILLNESS        Oncology History   Malignant neoplasm of lower-outer quadrant of left breast of female, estrogen receptor positive (HCC)   9/17/2019 Biopsy    Left breast ultrasound-guided biospy  Invasive ductal carcinoma   Grade 1  ER 70%  [place] < 1%  HER2 3+     10/10/2019 Genetic Testing    The following  genes were evaluated: MOI, BRCA1, BRCA2, CDH1, CHEK2, PALB2, PTEN, STK11, TP53  Negative result. No pathogenic sequence variants or deletions/dupllications identified  [employer]     10/29/2019 Surgery    Left breast lumpectomy with sentinel lymph node biopsy  Margins negative  0/1 Lymph Nodes  Anatomic/Prognostic Stage IIA    Dr. Padgett     12/6/2019 - 12/17/2020 Chemotherapy    pegfilgrastim (NEULASTA) subcutaneous injection 6 mg, 6 mg, Subcutaneous, Once, 1 of 1 cycle  Administration: 6 mg (12/30/2019)  pegfilgrastim (NEULASTA ONPRO) subcutaneous injection kit 6 mg, 6 mg, Subcutaneous, Once, 6 of 6 cycles  Administration: 6 mg (12/6/2019), 6 mg (12/27/2019), 6 mg (1/17/2020), 6 mg (2/7/2020), 6 mg (2/28/2020), 6 mg (3/20/2020)  fosaprepitant (EMEND) 150 mg in sodium chloride 0.9 % 255 mL IVPB, 150 mg, Intravenous, Once, 6 of 6 cycles  Administration: 150 mg (12/6/2019), 150 mg (12/27/2019), 150 mg (1/17/2020), 150 mg (2/7/2020), 150 mg (2/28/2020), 150 mg (3/20/2020)  CARBOplatin (PARAPLATIN) 616.8 mg in sodium chloride 0.9 % 250 mL IVPB, 616.8 mg, Intravenous, Once, 6 of 6 cycles  Administration: 616.8 mg (12/6/2019), 616.8 mg (12/27/2019), 616.8 mg (1/17/2020), 616.8 mg (2/7/2020), 616.8 mg (2/28/2020), 616.8 mg (3/20/2020)  DOCEtaxel (TAXOTERE) 106.2 mg in sodium chloride 0.9 % 250 mL chemo infusion, 60 mg/m2 = 106.2 mg (80 % of original dose 75 mg/m2), Intravenous, Once, 6 of 6 cycles  Dose modification: 60 mg/m2 (original dose 75 mg/m2, Cycle 1, Reason: Dose Not Tolerated), 75 mg/m2 (original dose 75 mg/m2, Cycle 2, Reason: Other (Must fill in a comment), Comment: LFT normalized. )  Administration: 106.2 mg (12/6/2019), 132.8 mg (12/27/2019), 132.8 mg (1/17/2020), 132.8 mg (2/7/2020), 132.8 mg (2/28/2020), 132.8 mg (3/20/2020)  trastuzumab (HERCEPTIN) 600 mg in sodium chloride 0.9 % 250 mL chemo infusion, 624 mg, Intravenous, Once, 18 of 18 cycles  Administration: 600 mg (12/6/2019), 450 mg (12/27/2019), 450 mg  (4/10/2020), 450 mg (1/17/2020), 450 mg (2/7/2020), 450 mg (2/28/2020), 450 mg (3/20/2020), 450 mg (5/1/2020), 450 mg (5/22/2020), 450 mg (6/12/2020), 450 mg (7/2/2020), 450 mg (7/24/2020), 450 mg (8/14/2020), 450 mg (9/4/2020), 450 mg (9/28/2020), 450 mg (10/16/2020), 450 mg (11/6/2020), 450 mg (11/27/2020)     5/4/2020 - 6/2/2020 Radiation    Plan ID Energy Fractions Dose per Fraction (cGy) Dose Correction (cGy) Total Dose Delivered (cGy) Elapsed Days   BH L BOOST 6X 5 / 5 200 0 1,000 6   BH L BREAST 6X 16 / 16 266 0 4,256 22     Dr. Sharp       6/2020 -  Hormone Therapy    Anastrozole       Previous Hematologic/ Oncologic Treatment:       1. Adjuvant chemotherapy with TCH x6 cycle.  Completed in March 2020.      2. Adjuvant trastuzumab monotherapy , completed in November 2020.      Current Hematologic/ Oncologic Treatment:       1. Adjuvant hormonal therapy with anastrozole since June 2020.     SUBJECTIVE  (INTERVAL HISTORY)      Clotting History None   Bleeding History None   Cancer History L Breast   Family Cancer History Father (prostate)   H/O Blood/Plt Transfusion None   Tobacco/etoh/drug abuse 1 PPD x 30 years, quit mid 40's, no etoh abuse or rec drug use           Occupation Spouse is a [email or web address], she is retired from [employer] laboratory: now is a water      Pain: mild joints that are normal aches that is unchanged    Night sweats, hot flashes: none    No dry skin, mood swings.    I have reviewed the relevant past medical, surgical, social and family history. I have also reviewed allergies and medications for this patient.    Review of Systems    Baseline weight: 180 lbs    Denies F/C, N/V, SOB, CP, LH, HA, rash, itching, gen weakness, melena, hematuria, hematochezia, falls, diarrhea, or constipation       A 10-point review of system was performed, pertinent positive and negative were detailed as above. Otherwise, the 10-point review of system was negative.      Past Medical History:    Diagnosis Date    Breast cancer (HCC) 10/2019    grade 3 ER Positive OR negative Her 2 3+ disease left    History of chemotherapy 03/2020    History of radiation therapy 04/2020    Hyperlipidemia     Hypertension     Vertigo     no medication       Past Surgical History:   Procedure Laterality Date    BREAST BIOPSY Left 09/17/2019    Left U/S BX invasive mammary carcinoma    BREAST BIOPSY Right 09/17/2021    benign    BREAST LUMPECTOMY Left 10/29/2019    Procedure: BREAST NEEDLE LOCALIZED LUMPECTOMY (NEEDLE LOC AT 0800);  Surgeon: Aguilar Padgett MD;  [facility];  Service: Surgical Oncology    FRACTURE SURGERY Left     surgery repair- elbow     IR PORT PLACEMENT  12/03/2019    IR PORT REMOVAL  12/29/2020    LYMPH NODE BIOPSY Left 10/29/2019    Procedure: SENTINEL LYMPH NODE BIOPSY; LYMPHATIC MAPPING WITH BLUE DYE AND RADIOACTIVE DYE (INJECT AT 0900 BY DR PADGETT IN THE OR);  Surgeon: Aguilar Padgett MD;  Location[facility];  Service: Surgical Oncology    MAMMO NEEDLE LOCALIZATION LEFT (ALL INC) Left 10/29/2019    MAMMO STEREOTACTIC BREAST BIOPSY RIGHT (ALL INC) Right 09/17/2019    US GUIDANCE BREAST BIOPSY LEFT EACH ADDITIONAL Left 09/17/2019    US GUIDED BREAST BIOPSY LEFT COMPLETE Left 09/17/2019    WISDOM TOOTH EXTRACTION Bilateral        Family History   Problem Relation Age of Onset    No Known Problems Mother     Prostate cancer Father 70    Arthritis Father     Hypertension Father     No Known Problems Sister     No Known Problems Maternal Grandmother     No Known Problems Maternal Grandfather     No Known Problems Paternal Grandmother     No Known Problems Paternal Grandfather     No Known Problems Paternal Aunt     Nail disease Paternal Aunt     No Known Problems Paternal Aunt        Social History     Socioeconomic History    Marital status: /Civil Union     Spouse name: Not on file    Number of children: Not on file    Years of education: Not on file    Highest education level: Not on file    Occupational History    Not on file   Tobacco Use    Smoking status: Former     Current packs/day: 0.00     Average packs/day: 0.8 packs/day for 38.0 years (28.5 ttl pk-yrs)     Types: Cigarettes     Start date: 1972     Quit date: 2010     Years since quitting: 15.3     Passive exposure: Past    Smokeless tobacco: Never   Vaping Use    Vaping status: Never Used   Substance and Sexual Activity    Alcohol use: Yes    Drug use: Never    Sexual activity: Yes     Partners: Female     Birth control/protection: None, Post-menopausal   Other Topics Concern    Not on file   Social History Narrative    Not on file     Social Drivers of Health     Financial Resource Strain: Not on file   Food Insecurity: Not on file   Transportation Needs: Not on file   Physical Activity: Not on file   Stress: Not on file   Social Connections: Not on file   Intimate Partner Violence: Not on file   Housing Stability: Not on file       Allergies   Allergen Reactions    Adhesive [Medical Tape] Hives       Current Outpatient Medications   Medication Sig Dispense Refill    amLODIPine (NORVASC) 5 mg tablet Take 1 tablet (5 mg total) by mouth daily 90 tablet 1    anastrozole (ARIMIDEX) 1 mg tablet Take 1 tablet (1 mg total) by mouth daily 90 tablet 0    ezetimibe (ZETIA) 10 mg tablet Take 1 tablet (10 mg total) by mouth daily 90 tablet 1    Multiple Vitamins-Minerals (CENTRUM SILVER 50+WOMEN PO) Take 1 tablet by mouth daily in the early morning       olmesartan-hydrochlorothiazide (BENICAR HCT) 40-12.5 MG per tablet Take 1 tablet by mouth daily 90 tablet 0     No current facility-administered medications for this visit.       (Not in a hospital admission)      Objective:     24 Hour Vitals Assessment:     There were no vitals filed for this visit.        PHYSICIAN EXAM:    General: Appearance: alert, cooperative, no distress.  HEENT: Normocephalic, atraumatic. No scleral icterus. conjunctivae clear. EOMI.  Chest: No tenderness to palpation. No  open wound noted.  Lungs: Clear to auscultation bilaterally, Respirations unlabored.  Cardiac: Tachycardia, +S1and S2  Abdomen: Soft, non-tender, non-distended. Bowel sounds are normal.  Extremities:  No edema, cyanosis, clubbing. L axilla with small 1 cm lump  Skin: Skin color, turgor are normal. No rashes.  Lymphatics: no palpable supra-cervical, axillary, or inguinal adenopathy  Neurologic: Awake, Alert, and oriented, no gross focal deficits noted b/l.       DATA REVIEW:    Pathology Result:    Final Diagnosis   Date Value Ref Range Status   08/07/2020   Final    A.  Cervix, location not specified #1, biopsy:  - Endocervical polyp/fibroepithelial cervical polyp.    B.  Cervix, location not specified #2, biopsy:  - Endocervical polyp with nabothian cysts.     Clinical correlation is recommended to address whether the lesion has been completely removed and/or sufficiently sampled to assure that the maximum pathologic findings have been determined.    Interpretation performed at [facility], [address] [id number]     10/29/2019   Incomplete    A. Left breast, wire-guided & oriented lumpectomy:  - Invasive mammary carcinoma of no special type (ductal, not otherwise specified) - see Note and synoptic report.   * [place] histologic grade 3 of 3 (total score: 3+3+3 = 9)    -- glandular (acinar) tubular differentiation < 10%, score 3     -- nuclear pleomorphism grade 3 of 3, score 3    -- mitotic rate up to 11/mm2, score 3.   * invasive carcinoma is co-located with prior biopsy site and is multifocal, with main tumor measuring up to 22 millimeters maximal diameter (slides A10-1, A11-1, A12-1 & A13-1) and three satellite tumors, each between 1 & 2 millimeters diameter (A7-1, A8-1, & A25-1).   * unremarkable skin is present and appears uninvolved by invasive carcinoma; no skeletal muscle seen.   * invasive carcinoma is focally present at a cauterized, blue-inked inferior margin; all other lumpectomy  margins are uninvolved by invasive carcinoma.  - Ductal carcinoma in-situ (DCIS): present, minor component (<25% of total tumor).    * architectural patterns: solid & cribriform   * nuclear grade: grade 2 of 3   * necrosis: central comedo-type necrosis is present.   * all lumpectomy margins appear uninvolved by DCIS.  - Lymphovascular invasion: not identified.  - Microcalcifications: absent.  - Additional pathologic findings:  non-proliferative fibrocystic changes, without atypia, including columnar cell change.    B. Left breast inferior margin blue:  - No invasive or in situ carcinoma seen - specimen is benign fibroadipose tissue; no breast glandular tissue seen.  - Final margins are checked and appear uninvolved by neoplasia.    C. Left breast lateral margin red:  - No invasive or in situ carcinoma seen - specimen is benign fatty breast tissue.  - Final margins are checked and appear uninvolved by neoplasia.    D. Left breast medial margin yellow:  - No invasive or in situ carcinoma seen - specimen is benign fatty breast tissue.  - Final margins are checked and appear uninvolved by neoplasia.    E. Left breast posterior margin:  - No invasive or in situ carcinoma seen - specimen is benign fatty breast tissue.  - Final margins are checked and appear uninvolved by neoplasia.    F. Left breast superior margin orange:  - No invasive or in situ carcinoma seen - specimen is benign proliferative fibrocystic changes, without atypia, including usual ductal hyperplasia.  - Intraductal microcalcifications are present in fibrocystic change.  - Final margins are checked and appear uninvolved by neoplasia.    G. Lymph node sentinel, left breast:  - One lymph node is indefinite for a minute group of individual tumor cells (~32 cells, < 0.2 millimeters) in subcapsular sinus, pN0(sn) - see Note.     09/17/2019   Final    A. Left breast, 5:00, 7 cm from nipple, ultrasound-guided large core (12 G) needle biopsy x 3:  - Invasive  mammary carcinoma of no special type (ductal, not otherwise specified).   * [place] histologic grade 1 of 3 (total score: 2+2+1 = 5)    -- glandular (acinar) tubular differentiation ~ 15%, score 2     -- nuclear pleomorphism 2 of 3, score 2    -- mitotic rate < 3/mm2, score 1.   * confirmed by tumor cell immunophenotype:    -- positive: E-cadherin, P120 - each in a cytoplasmic membrane-restricted pattern.    -- negative: p63, calponin-B, [facility].   * invasive carcinoma involves 3 of 3 submitted core biopsies, maximal dimension = 14 millimeters.   * estrogen, progesterone & HER2 receptor studies pending, to be described in an addendum report.  - Ductal carcinoma in-situ (DCIS): present, minor component (<25% of total tumor).    * architectural patterns: cribriform   * nuclear grade: grade 2 of 3   * necrosis: central comedo-type necrosis is present.  - Lymphovascular invasion: not identified.  - Microcalcifications: absent.  - Best representative tumor block: A1  - Sufficient tumor present for:   * [place] Mammaprint/Blueprint (1 cm2 of invasive tumor in aggregate): Yes   * MI Profile/Foundation One (at least 5 x 5 mm of tumor): Yes.    Note:  Intradepartmental consultation concurs with invasive ductal carcinoma.  Final report is telephonically discussed with Ms. BRUNO Loya, [facility] @ 10:05 AM, 9/19/2019.    B. Left breast, 1:00 5 cm from nipple, ultrasound-guided large core needle biopsy x 2:  - Benign, proliferative fibrocystic changes, without atypia, including usual ductal hyperplasia adjacent to and involving an intraductal micropapilloma; no calcifications seen.  - No evidence of malignancy.    C. Right breast without calcifications, large core needle biopsy x 5:  - Benign, proliferative fibrocystic changes, without atypia, including usual ductal hyperplasia; no calcifications seen.  - No evidence of malignancy.     09/17/2019   Final    A. Left breast, 5:00, 7 cm from nipple, ultrasound-guided  large core (12 G) needle biopsy x 3:  - Invasive mammary carcinoma of no special type (ductal, not otherwise specified).   * [place] histologic grade 1 of 3 (total score: 2+2+1 = 5)    -- glandular (acinar) tubular differentiation ~ 15%, score 2     -- nuclear pleomorphism 2 of 3, score 2    -- mitotic rate < 3/mm2, score 1.   * confirmed by tumor cell immunophenotype:    -- positive: E-cadherin, P120 - each in a cytoplasmic membrane-restricted pattern.    -- negative: p63, calponin-B, [facility].   * invasive carcinoma involves 3 of 3 submitted core biopsies, maximal dimension = 14 millimeters.   * estrogen, progesterone & HER2 receptor studies pending, to be described in an addendum report.  - Ductal carcinoma in-situ (DCIS): present, minor component (<25% of total tumor).    * architectural patterns: cribriform   * nuclear grade: grade 2 of 3   * necrosis: central comedo-type necrosis is present.  - Lymphovascular invasion: not identified.  - Microcalcifications: absent.  - Best representative tumor block: A1  - Sufficient tumor present for:   * [place] Mammaprint/Blueprint (1 cm2 of invasive tumor in aggregate): Yes   * MI Profile/Foundation One (at least 5 x 5 mm of tumor): Yes.    Note:  Intradepartmental consultation concurs with invasive ductal carcinoma.  Final report is telephonically discussed with Ms. BRUNO Loya, [facility] @ 10:05 AM, 9/19/2019.    B. Left breast, 1:00 5 cm from nipple, ultrasound-guided large core needle biopsy x 2:  - Benign, proliferative fibrocystic changes, without atypia, including usual ductal hyperplasia adjacent to and involving an intraductal micropapilloma; no calcifications seen.  - No evidence of malignancy.    C. Right breast without calcifications, large core needle biopsy x 5:  - Benign, proliferative fibrocystic changes, without atypia, including usual ductal hyperplasia; no calcifications seen.  - No evidence of malignancy.          Image Results:   Image  result are reviewed and documented in Hematology/Oncology history    US Breast Axilla Left, Mammo diagnostic bilateral w 3d and cad  Narrative: DIAGNOSIS: Malignant neoplasm of lower-outer quadrant of left breast of   female, estrogen receptor positive (HCC); Lump of axilla, left     TECHNIQUE:   Digital diagnostic mammography was performed. Computer Aided Detection   (CAD) analyzed all applicable images.  Left breast ultrasound was performed.     COMPARISONS: Prior breast imaging dated: 11/20/2023, 11/29/2022,   11/03/2021, 11/02/2020, 10/29/2019, 09/17/2019, 09/17/2019, 09/17/2019,   09/17/2019, 09/10/2019, and 09/10/2019    RELEVANT HISTORY:    Family Breast Cancer History: No known family history of breast cancer.  Family Medical History: No known relevant family medical history.   Personal History: Hormone history includes other. Surgical history   includes breast biopsy and lumpectomy. Medical history includes breast   cancer and history of chemotherapy.    RISK ASSESSMENT:   Tyrer-Cuzick risk assessment reporting was suppressed due to the patient's   history and/or demographic factors.    TISSUE DENSITY:   There are scattered areas of fibroglandular density.     INDICATION: Parul Kelley is a 69 y.o. female presenting for annual.    FINDINGS:   LEFT  1) POST-SURGICAL FINDING [D]  Mammo diagnostic bilateral w 3d and cad: There are post-surgical findings   from a previous lumpectomy with radiation seen in the lower central region   of the left breast in the posterior depth. Compared to the previous study,   there are no significant changes. There has been no interval development   of a suspicious mass, microcalcification, or architectural distortion.      2) ASYMMETRY [E]  Mammo diagnostic bilateral w 3d and cad: There is an asymmetry seen in the   upper region of the left breast in the posterior depth on the MLO view.   The asymmetry correlates with the palpable finding reported by the   patient.   US Breast  "Axilla Left: There is a 7 mm x 4 mm x 6 mm oval, subdermal   complicated cyst with circumscribed margins seen in the left axilla in the   posterior depth. The cyst correlates with the palpable mass reported by   the patient and with the prior mammogram finding.  A small tract is seen   extending into the skin, finding consistent with complicated sebaceous   cyst.    Right  Mammo diagnostic bilateral w 3d and cad  There are no suspicious masses, grouped microcalcifications or areas of   unexplained architectural distortion. The skin and nipple areolar complex   are unremarkable.     Impression:    1.  Stable therapeutic changes left breast.  2.  No evidence for malignancy.  3.  Small sebaceous cyst in the left axilla at the area of palpable   concern.  Clinical management recommended.    ASSESSMENT/BI-RADS CATEGORY:  Left: 2 - Benign  Right: 2 - Benign  Overall: 2 - Benign    RECOMMENDATION:       - Clinical management for the left breast.       - Routine screening mammogram in 1 year for both breasts.    Workstation ID: [id number]    Signed by:  Mike Brown MD      LABS:  [facility].       Lab Results   Component Value Date    HGB 13.9 08/05/2022    HCT 42.4 08/05/2022    MCV 99 (H) 08/05/2022     08/05/2022    WBC 5.53 08/05/2022    NRBC 0 06/14/2021     Lab Results   Component Value Date    K 4.2 02/18/2025    CL 96 02/18/2025    CO2 30 02/18/2025    BUN 10 02/18/2025    CREATININE 0.64 02/18/2025    GLUF 87 01/11/2024    CALCIUM 10.2 02/18/2025    AST 65 (H) 01/11/2024     (H) 01/11/2024    ALKPHOS 75 01/11/2024    EGFR 91 02/18/2025       No results found for: \"IRON\", \"TIBC\", \"FERRITIN\"    No results found for: \"WKDNFXJB32\"    No results for input(s): \"WBC\", \"CREAT\", \"PLT\" in the last 72 hours.    By:  Elisabet Rowley MD, 4/23/2025, 8:57 AM                                  "

## 2025-04-23 NOTE — ASSESSMENT & PLAN NOTE
This is a 69 y.o. c PMHx notable for HLP, HTN, vertigo, being seen in consultation for L Localized HR+ Breast cancer on adjuvant endocrine therapy

## 2025-04-28 DIAGNOSIS — I10 ESSENTIAL HYPERTENSION: ICD-10-CM

## 2025-04-28 DIAGNOSIS — I10 PRIMARY HYPERTENSION: ICD-10-CM

## 2025-04-29 RX ORDER — OLMESARTAN MEDOXOMIL AND HYDROCHLOROTHIAZIDE 40/12.5 40; 12.5 MG/1; MG/1
1 TABLET ORAL DAILY
Qty: 90 TABLET | Refills: 0 | Status: SHIPPED | OUTPATIENT
Start: 2025-04-29

## 2025-04-29 RX ORDER — AMLODIPINE BESYLATE 5 MG/1
5 TABLET ORAL DAILY
Qty: 90 TABLET | Refills: 0 | Status: SHIPPED | OUTPATIENT
Start: 2025-04-29

## 2025-05-01 ENCOUNTER — OFFICE VISIT (OUTPATIENT)
Dept: HEMATOLOGY ONCOLOGY | Facility: CLINIC | Age: 70
End: 2025-05-01
Payer: COMMERCIAL

## 2025-05-01 VITALS
HEART RATE: 99 BPM | HEIGHT: 61 IN | BODY MASS INDEX: 32.1 KG/M2 | RESPIRATION RATE: 16 BRPM | DIASTOLIC BLOOD PRESSURE: 66 MMHG | WEIGHT: 170 LBS | SYSTOLIC BLOOD PRESSURE: 126 MMHG | OXYGEN SATURATION: 98 %

## 2025-05-01 DIAGNOSIS — Z79.811 USE OF ANASTROZOLE: ICD-10-CM

## 2025-05-01 DIAGNOSIS — Z17.0 MALIGNANT NEOPLASM OF LOWER-OUTER QUADRANT OF LEFT BREAST OF FEMALE, ESTROGEN RECEPTOR POSITIVE (HCC): Primary | ICD-10-CM

## 2025-05-01 DIAGNOSIS — C50.512 MALIGNANT NEOPLASM OF LOWER-OUTER QUADRANT OF LEFT BREAST OF FEMALE, ESTROGEN RECEPTOR POSITIVE (HCC): Primary | ICD-10-CM

## 2025-05-01 PROCEDURE — 99215 OFFICE O/P EST HI 40 MIN: CPT | Performed by: INTERNAL MEDICINE

## 2025-05-01 RX ORDER — ANASTROZOLE 1 MG/1
1 TABLET ORAL DAILY
Qty: 90 TABLET | Refills: 3 | Status: SHIPPED | OUTPATIENT
Start: 2025-05-01

## 2025-05-29 ENCOUNTER — TELEMEDICINE (OUTPATIENT)
Age: 70
End: 2025-05-29
Payer: COMMERCIAL

## 2025-05-29 VITALS — BODY MASS INDEX: 32.1 KG/M2 | HEIGHT: 61 IN | WEIGHT: 170 LBS

## 2025-05-29 DIAGNOSIS — R05.1 ACUTE COUGH: ICD-10-CM

## 2025-05-29 DIAGNOSIS — S20.212A RIB CONTUSION, LEFT, INITIAL ENCOUNTER: Primary | ICD-10-CM

## 2025-05-29 PROCEDURE — 99214 OFFICE O/P EST MOD 30 MIN: CPT | Performed by: FAMILY MEDICINE

## 2025-05-29 RX ORDER — BENZONATATE 200 MG/1
200 CAPSULE ORAL 3 TIMES DAILY PRN
Qty: 30 CAPSULE | Refills: 0 | Status: SHIPPED | OUTPATIENT
Start: 2025-05-29

## 2025-05-29 RX ORDER — METHOCARBAMOL 500 MG/1
500 TABLET, FILM COATED ORAL 3 TIMES DAILY
Qty: 30 TABLET | Refills: 0 | Status: SHIPPED | OUTPATIENT
Start: 2025-05-29

## 2025-05-29 NOTE — PROGRESS NOTES
"Virtual Regular Visit  Name: Parul Kelley      : 1955      MRN: [id number]  Encounter Provider: Pacheco Fry DO  Encounter Date: 2025   Encounter department: [facility]  :  Assessment & Plan  Rib contusion, left, initial encounter    Orders:  •  diclofenac sodium (VOLTAREN) 50 mg EC tablet; Take 1 tablet (50 mg total) by mouth 3 (three) times a day  •  methocarbamol (ROBAXIN) 500 mg tablet; Take 1 tablet (500 mg total) by mouth 3 (three) times a day    Acute cough    Orders:  •  benzonatate (TESSALON) 200 MG capsule; Take 1 capsule (200 mg total) by mouth 3 (three) times a day as needed for cough (cough)        History of Present Illness     Patient presents with:  Virtual Regular Visit: Patient suffered a fall  down the step. Pt is complaining of left sided pain. Pt sneeze today and pulled a muscle in her back,.  No other concerns.  LR  Virtual Regular Visit          Review of Systems   HENT:  Positive for postnasal drip.    Respiratory:  Positive for cough.    Cardiovascular: Negative.    Musculoskeletal:         Patient presents with:  Virtual Regular Visit: Patient suffered a fall  down the step. Pt is complaining of left sided pain. Pt sneeze today and pulled a muscle in her back,.           Objective   Ht 5' 1\" (1.549 m)   Wt 77.1 kg (170 lb)   LMP  (LMP Unknown)   BMI 32.12 kg/m²     Physical Exam    Administrative Statements   Encounter provider Pacheco Fry DO    The Patient is located at Home and in the following state in which I hold an active license [place].    The patient was identified by name and date of birth. Parul Kelley was informed that this is a telemedicine visit and that the visit is being conducted through the Epic Embedded platform. She agrees to proceed..  My office door was closed. No one else was in the room.  She acknowledged consent and understanding of privacy and security of the video platform. The patient has agreed to " participate and understands they can discontinue the visit at any time.    I have spent a total time of 7 minutes in caring for this patient on the day of the visit/encounter including Prognosis, Instructions for management, and Impressions, not including the time spent for establishing the audio/video connection.

## 2025-06-08 DIAGNOSIS — E78.2 MIXED HYPERLIPIDEMIA: ICD-10-CM

## 2025-06-08 DIAGNOSIS — I10 PRIMARY HYPERTENSION: ICD-10-CM

## 2025-06-08 RX ORDER — EZETIMIBE 10 MG/1
10 TABLET ORAL DAILY
Qty: 90 TABLET | Refills: 1 | Status: SHIPPED | OUTPATIENT
Start: 2025-06-08

## 2025-07-29 ENCOUNTER — VBI (OUTPATIENT)
Dept: ADMINISTRATIVE | Facility: OTHER | Age: 70
End: 2025-07-29

## 2025-07-29 DIAGNOSIS — I10 ESSENTIAL HYPERTENSION: ICD-10-CM

## 2025-07-30 RX ORDER — OLMESARTAN MEDOXOMIL AND HYDROCHLOROTHIAZIDE 40/12.5 40; 12.5 MG/1; MG/1
1 TABLET ORAL DAILY
Qty: 90 TABLET | Refills: 1 | Status: SHIPPED | OUTPATIENT
Start: 2025-07-30

## 2025-08-15 ENCOUNTER — APPOINTMENT (OUTPATIENT)
Dept: LAB | Facility: CLINIC | Age: 70
End: 2025-08-15

## 2025-09-05 ENCOUNTER — ESTABLISHED COMPREHENSIVE EXAM (OUTPATIENT)
Dept: URBAN - METROPOLITAN AREA CLINIC 6 | Facility: CLINIC | Age: 70
End: 2025-09-05

## 2025-09-05 DIAGNOSIS — H25.813: ICD-10-CM

## 2025-09-05 DIAGNOSIS — H35.373: ICD-10-CM

## 2025-09-05 DIAGNOSIS — Z85.3: ICD-10-CM

## 2025-09-05 DIAGNOSIS — H40.023: ICD-10-CM

## 2025-09-05 DIAGNOSIS — H40.053: ICD-10-CM

## 2025-09-05 PROCEDURE — 92014 COMPRE OPH EXAM EST PT 1/>: CPT

## 2025-09-05 PROCEDURE — 92133 CPTRZD OPH DX IMG PST SGM ON: CPT

## 2025-09-05 ASSESSMENT — TONOMETRY
OD_IOP_MMHG: 16
OS_IOP_MMHG: 18

## 2025-09-05 ASSESSMENT — VISUAL ACUITY
OD_CC: 20/30+2
OS_CC: 20/30

## (undated) DEVICE — GLOVE SRG BIOGEL 7

## (undated) DEVICE — MEDI-VAC YANK SUCT HNDL W/TPRD BULBOUS TIP: Brand: CARDINAL HEALTH

## (undated) DEVICE — 3M™ STERI-STRIP™ REINFORCED ADHESIVE SKIN CLOSURES, R1547, 1/2 IN X 4 IN (12 MM X 100 MM), 6 STRIPS/ENVELOPE: Brand: 3M™ STERI-STRIP™

## (undated) DEVICE — VIAL DECANTER

## (undated) DEVICE — SUT VICRYL 3-0 SH 27 IN J416H

## (undated) DEVICE — CHLORAPREP HI-LITE 26ML ORANGE

## (undated) DEVICE — SYRINGE 1ML TB 26G X 3/8 SAFETY

## (undated) DEVICE — SMOKE EVACUATION TUBING WITH 8 IN INTEGRAL WAND AND SPONGE GUARD: Brand: BUFFALO FILTER

## (undated) DEVICE — INTENDED FOR TISSUE SEPARATION, AND OTHER PROCEDURES THAT REQUIRE A SHARP SURGICAL BLADE TO PUNCTURE OR CUT.: Brand: BARD-PARKER SAFETY BLADES SIZE 15, STERILE

## (undated) DEVICE — LIGHT HANDLE COVER SLEEVE DISP BLUE STELLAR

## (undated) DEVICE — SUT MONOCRYL 4-0 PS-2 18 IN Y496G

## (undated) DEVICE — DRAPE EQUIPMENT RF WAND

## (undated) DEVICE — TUBING SUCTION 5MM X 12 FT

## (undated) DEVICE — PENCIL ELECTROSURG E-Z CLEAN -0035H

## (undated) DEVICE — BETHLEHEM UNIVERSAL MINOR GEN: Brand: CARDINAL HEALTH

## (undated) DEVICE — MARGIN MARKER SET

## (undated) DEVICE — NEEDLE 25G X 1 1/2

## (undated) DEVICE — DRAPE PROBE NEO-PROBE/ULTRASOUND